# Patient Record
Sex: MALE | Race: BLACK OR AFRICAN AMERICAN | Employment: UNEMPLOYED | URBAN - METROPOLITAN AREA
[De-identification: names, ages, dates, MRNs, and addresses within clinical notes are randomized per-mention and may not be internally consistent; named-entity substitution may affect disease eponyms.]

---

## 2017-01-20 ENCOUNTER — HOSPITAL ENCOUNTER (EMERGENCY)
Age: 37
Discharge: HOME | End: 2017-01-20
Attending: EMERGENCY MEDICINE
Payer: SELF-PAY

## 2017-01-20 VITALS
HEART RATE: 74 BPM | BODY MASS INDEX: 36.57 KG/M2 | TEMPERATURE: 98.6 F | OXYGEN SATURATION: 98 % | WEIGHT: 270 LBS | HEIGHT: 72 IN | SYSTOLIC BLOOD PRESSURE: 158 MMHG | RESPIRATION RATE: 16 BRPM | DIASTOLIC BLOOD PRESSURE: 95 MMHG

## 2017-01-20 DIAGNOSIS — E79.0 ELEVATED URIC ACID IN BLOOD: ICD-10-CM

## 2017-01-20 DIAGNOSIS — M25.561 PAIN AND SWELLING OF KNEE, RIGHT: Primary | ICD-10-CM

## 2017-01-20 DIAGNOSIS — M25.461 PAIN AND SWELLING OF KNEE, RIGHT: Primary | ICD-10-CM

## 2017-01-20 DIAGNOSIS — M10.061 ACUTE IDIOPATHIC GOUT OF RIGHT KNEE: ICD-10-CM

## 2017-01-20 LAB
ANION GAP BLD CALC-SCNC: 6 MMOL/L (ref 3–18)
BUN SERPL-MCNC: 10 MG/DL (ref 7–18)
BUN/CREAT SERPL: 9 (ref 12–20)
CALCIUM SERPL-MCNC: 8.8 MG/DL (ref 8.5–10.1)
CHLORIDE SERPL-SCNC: 103 MMOL/L (ref 100–108)
CO2 SERPL-SCNC: 28 MMOL/L (ref 21–32)
CREAT SERPL-MCNC: 1.17 MG/DL (ref 0.6–1.3)
GLUCOSE SERPL-MCNC: 89 MG/DL (ref 74–99)
POTASSIUM SERPL-SCNC: 4 MMOL/L (ref 3.5–5.5)
SODIUM SERPL-SCNC: 137 MMOL/L (ref 136–145)
URATE SERPL-MCNC: 7.7 MG/DL (ref 2.6–7.2)

## 2017-01-20 PROCEDURE — 84550 ASSAY OF BLOOD/URIC ACID: CPT | Performed by: PHYSICIAN ASSISTANT

## 2017-01-20 PROCEDURE — 99282 EMERGENCY DEPT VISIT SF MDM: CPT

## 2017-01-20 PROCEDURE — 80048 BASIC METABOLIC PNL TOTAL CA: CPT | Performed by: PHYSICIAN ASSISTANT

## 2017-01-20 RX ORDER — INDOMETHACIN 50 MG/1
50 CAPSULE ORAL 3 TIMES DAILY
Qty: 15 CAP | Refills: 0 | Status: SHIPPED | OUTPATIENT
Start: 2017-01-20 | End: 2017-01-25

## 2017-01-20 RX ORDER — COLCHICINE 0.6 MG/1
0.6 CAPSULE ORAL DAILY
Qty: 10 CAP | Refills: 1 | Status: SHIPPED | OUTPATIENT
Start: 2017-01-20 | End: 2017-06-02

## 2017-01-20 RX ORDER — HYDROCODONE BITARTRATE AND ACETAMINOPHEN 5; 325 MG/1; MG/1
1 TABLET ORAL
Qty: 8 TAB | Refills: 0 | Status: SHIPPED | OUTPATIENT
Start: 2017-01-20 | End: 2017-06-02

## 2017-01-20 NOTE — LETTER
NOTIFICATION RETURN TO WORK / SCHOOL 
 
1/20/2017 10:33 AM 
 
Mr. Kamilah Hawley 
[address] [place] [address] To Whom It May Concern: Kamilah Hawley is currently under the care of [facility] EMERGENCY DEPT. He will return to work/school on: 01/21/17 If there are questions or concerns please have the patient contact our office.  
 
 
 
Sincerely, 
 
 
 
 
 
 
 
JOON Agarwal

## 2017-01-20 NOTE — DISCHARGE INSTRUCTIONS
WEIGHT LOSS. STOP DRINKING.    FOLLOW UP WITH ORTHOPEDIC SPECIALIST FOR POSSIBLE DRAINAGE OF FLUID FROM RIGHT KNEE

## 2017-01-20 NOTE — ED PROVIDER NOTES
HPI Comments: 43yo male presents to ER complaining of right knee pain and swelling. States discomfort started 2 days ago and he thinks it is his gout. Admits to history of testing that indicated he had gout. States he initially was diagnosed with gout when right foot became became swollen, painful, \"purple/red\" discoloration. Denies any hip, thigh, lower leg, ankle, foot pain currently. Pain increased with weight bearing. Denies any recent or remote injuries. Patient is a 43 y.o. male presenting with knee pain and knee swelling. Knee Pain    Pertinent negatives include no numbness and no back pain. Knee Swelling    Pertinent negatives include no numbness and no back pain. Past Medical History:   Diagnosis Date    Elevated uric acid in blood 03/05/2015     8.6 mg/dL    Hyperlipidemia     Hypertension     Medical non-compliance     Renal insufficiency 04/06/2011     BUN/sCr: 14/1.4, GFR >60    Right knee pain 09/20/2010     Plain Film NEG    Right knee pain 04/10/2015     Plain Film NEG       Past Surgical History:   Procedure Laterality Date    Hx tonsillectomy      Hx cyst removal           No family history on file. Social History     Social History    Marital status: SINGLE     Spouse name: N/A    Number of children: N/A    Years of education: N/A     Occupational History    Not on file. Social History Main Topics    Smoking status: Never Smoker    Smokeless tobacco: Not on file    Alcohol use Yes      Comment: socially    Drug use: No    Sexual activity: Yes     Birth control/ protection: Condom     Other Topics Concern    Not on file     Social History Narrative         ALLERGIES: Review of patient's allergies indicates no known allergies. Review of Systems   Constitutional: Positive for fatigue. Negative for activity change, appetite change, chills and fever. HENT: Negative. Respiratory: Negative. Negative for shortness of breath.     Cardiovascular: Negative. Negative for chest pain. Gastrointestinal: Negative for abdominal pain. Musculoskeletal: Positive for arthralgias and joint swelling. Negative for back pain, gait problem and myalgias. Skin: Negative for color change and rash. Neurological: Negative for weakness and numbness. All other systems reviewed and are negative. Vitals:    01/20/17 0856   BP: (!) 158/95   Pulse: 74   Resp: 16   Temp: 98.6 °F (37 °C)   SpO2: 98%   Weight: 122.5 kg (270 lb)   Height: 6' (1.829 m)            Physical Exam   Constitutional: He is oriented to person, place, and time. He appears well-developed. No distress. Obese   HENT:   Mouth/Throat: Oropharynx is clear and moist.   Neck: Normal range of motion. Cardiovascular: Normal rate, regular rhythm and normal heart sounds. Pulmonary/Chest: Effort normal and breath sounds normal.   Musculoskeletal:   Right knee is moderately swollen. No obvious warmth, no redness. Diffuse TTP, mildly. Mild pain with passive and active ROM. No instability. No lower leg swelling or pitting edema. Neurological: He is alert and oriented to person, place, and time. Skin: Skin is warm and dry. He is not diaphoretic. Psychiatric: He has a normal mood and affect. Nursing note and vitals reviewed. MDM  Number of Diagnoses or Management Options  Diagnosis management comments: 43yo male with right knee pain and swelling. I have reviewed 2 xrays, one from April 2015 which showed possible effusion and the other xray from Sept 2016 that showed very large effusion. Patient has not seen PCP or orthopedic specialist since last ER visit. Uric acid level in March 2015 was 8.6. Will recheck istat and uric acid today. If uric acid elevated with normal renal function will discharge with colchicine, indocin and few [place].  PCP and ortho referral.  Do not feel repeat xray warranted in the setting of no trauma.   Patient informed of elevated BP, need for weight loss, and advised to discontinue drinking 3-6 beers daily which he admitted to during history taking.      ED Course       Procedures

## 2017-04-04 ENCOUNTER — HOSPITAL ENCOUNTER (EMERGENCY)
Age: 37
Discharge: HOME | End: 2017-04-04
Attending: EMERGENCY MEDICINE
Payer: SELF-PAY

## 2017-04-04 VITALS
OXYGEN SATURATION: 97 % | WEIGHT: 285 LBS | HEART RATE: 73 BPM | RESPIRATION RATE: 17 BRPM | SYSTOLIC BLOOD PRESSURE: 151 MMHG | TEMPERATURE: 98.1 F | BODY MASS INDEX: 38.65 KG/M2 | DIASTOLIC BLOOD PRESSURE: 55 MMHG

## 2017-04-04 DIAGNOSIS — H10.11 ALLERGIC CONJUNCTIVITIS, RIGHT: Primary | ICD-10-CM

## 2017-04-04 PROCEDURE — 99283 EMERGENCY DEPT VISIT LOW MDM: CPT

## 2017-04-04 RX ORDER — PROPARACAINE HYDROCHLORIDE 5 MG/ML
2 SOLUTION/ DROPS OPHTHALMIC
Status: DISCONTINUED | OUTPATIENT
Start: 2017-04-04 | End: 2017-04-04 | Stop reason: HOSPADM

## 2017-04-04 RX ORDER — DIPHENHYDRAMINE HCL 25 MG
CAPSULE ORAL
Qty: 16 CAP | Refills: 0 | Status: SHIPPED | OUTPATIENT
Start: 2017-04-04 | End: 2017-06-02

## 2017-04-04 NOTE — ED TRIAGE NOTES
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 And some of the debris got into his eye.  Pt's right eye red,  Eyelid swollen

## 2017-04-04 NOTE — LETTER
[facility] 
[facility] EMERGENCY DEPT 
[address] [facility] [address] 
[phone] Work/School Note Date: 4/4/2017 To Whom It May concern: 
 
Patient can return to work on April 5, 2017. Eddie Lang was seen and treated today in the emergency room by the following provider(s): 
Attending Provider: Christel Reyes MD 
Physician Assistant: Willa Dallas PA-C. Eddie Lang Sincerely, AMAN Callahan

## 2017-04-04 NOTE — LETTER
[address] [place] [address] Dr [facility] EMERGENCY DEPT 
[address] [facility] [address] 
[phone] Work/School Note Date: 4/4/2017 To Whom It May concern: Saida Longoria was seen and treated today in the emergency room by the following provider(s): 
Attending Provider: Armin Giles MD 
Physician Assistant: Tiffanie Lora PA-C. Naveed Herrera {Return to school/sport/work:[id number]} Sincerely, Tiffanie Lora PA-C

## 2017-04-04 NOTE — LETTER
[facility] 
[facility] EMERGENC[facility]PT 
[address] [facility] [address] 
[phone] Work/School Note Date: 4/4/2017 To Whom It May concern: Daina Kerr was seen and treated today in the emergency room by the following provider(s): 
Attending Provider: Amy Valente MD 
Physician Assistant: Deneen Ríos PA-C. Daina Kerr can return to work on April 5, 2017. Sincerely, AMAN Perrin

## 2017-04-04 NOTE — ED PROVIDER NOTES
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 States he was working and believes debris came into contact with eye- irritation and redness since. No change in vision. No hx of std  Past Medical History:  03/05/2015: Elevated uric acid in blood      Comment: 8.6 mg/dL  No date: Hyperlipidemia  No date: Hypertension  No date: Medical non-compliance  04/06/2011: Renal insufficiency      Comment: BUN/sCr: 14/1.4, GFR >60  09/20/2010: Right knee pain      Comment: Plain Film NEG  04/10/2015: Right knee pain      Comment: Plain Film NEG       Patient is a 43 y.o. male presenting with eye pain. The history is provided by the patient. Eye Pain    This is a new problem. The current episode started 3 to 5 hours ago. The problem has not changed since onset. The injury mechanism is unknown. The patient is experiencing no pain. There is no known exposure to pink eye. He does not wear contacts. Associated symptoms include discharge, eye redness and itching. Pertinent negatives include no numbness, no blurred vision, no double vision, no foreign body sensation, no photophobia, no nausea, no vomiting, no tingling, no weakness, no fever, no pain, no blindness, no head injury and no dizziness. He has tried water, commercial eye wash and eye drops for the symptoms. The treatment provided moderate relief. Past Medical History:   Diagnosis Date    Elevated uric acid in blood 03/05/2015    8.6 mg/dL    Hyperlipidemia     Hypertension     Medical non-compliance     Renal insufficiency 04/06/2011    BUN/sCr: 14/1.4, GFR >60    Right knee pain 09/20/2010    Plain Film NEG    Right knee pain 04/10/2015    Plain Film NEG       Past Surgical History:   Procedure Laterality Date    HX CYST REMOVAL      HX TONSILLECTOMY           No family history on file.     Social History     Social History    Marital status: SINGLE     Spouse name: N/A    Number of children: N/A    Years of education: N/A     Occupational History    Not on file. Social History Main Topics    Smoking status: Never Smoker    Smokeless tobacco: Not on file    Alcohol use Yes      Comment: socially    Drug use: No    Sexual activity: Yes     Birth control/ protection: Condom     Other Topics Concern    Not on file     Social History Narrative         ALLERGIES: Review of patient's allergies indicates no known allergies. Review of Systems   Constitutional: Negative for chills and fever. Eyes: Positive for discharge, redness and itching. Negative for blindness, blurred vision, double vision, photophobia, pain and visual disturbance. Respiratory: Negative. Gastrointestinal: Negative for nausea and vomiting. Genitourinary: Negative. Skin: Positive for itching. Allergic/Immunologic: Negative. Neurological: Negative for dizziness, tingling, weakness and numbness. Vitals:    04/04/17 0540   BP: 151/55   Pulse: 73   Resp: 17   Temp: 98.1 °F (36.7 °C)   SpO2: 97%   Weight: 129.3 kg (285 lb)            Physical Exam   Constitutional: He is oriented to person, place, and time. He appears well-developed and well-nourished. HENT:   Head: Normocephalic and atraumatic. Mouth/Throat: Oropharynx is clear and moist.   Eyes: EOM are normal. Pupils are equal, round, and reactive to light. Lids are everted and swept, no foreign bodies found. Right eye exhibits discharge. Right eye exhibits no chemosis, no exudate and no hordeolum. No foreign body present in the right eye. Left eye exhibits no chemosis, no discharge, no exudate and no hordeolum. No foreign body present in the left eye. Right conjunctiva is injected. Right conjunctiva has no hemorrhage. Left conjunctiva is not injected. Left conjunctiva has no hemorrhage. No scleral icterus. Right eye exhibits normal extraocular motion and no nystagmus. Left eye exhibits normal extraocular motion and no nystagmus.    Right eye visualized under woods lamp and tetracaine/fluoro, no abrasion noted  Mild swelling noted to upper eyelid. No periorbital pain with palpation or ROM. Clear drainage. Vision grossly intact. Neck: Normal range of motion. Cardiovascular: Normal rate, regular rhythm and normal heart sounds. Pulmonary/Chest: Effort normal. No respiratory distress. He has no wheezes. He has no rales. Abdominal: Soft. He exhibits no distension. Neurological: He is oriented to person, place, and time. Nursing note and vitals reviewed. MDM  Number of Diagnoses or Management Options  Allergic conjunctivitis, right:   Diagnosis management comments: Impression: allergic conjunctivitis   Rx: Naphcon, benadryl as needed  optha f/u in 2 days.  Work note for today     ED Course       Procedures

## 2017-04-04 NOTE — LETTER
[address] [place] [address] Dr [facility] EMERGENCY DEPT 
[address] [facility] [id number] [phone] Work/School Note Date: 4/4/2017 To Whom It May concern: Eddie Lang was seen and treated today in the emergency room by the following provider(s): 
Attending Provider: Chuck Kirby MD 
Physician Assistant: Willa Dallas PA-C. Eddie Lang return to work 4/6/17 Sincerely, Willa Dallas PA-C

## 2017-04-04 NOTE — DISCHARGE INSTRUCTIONS
Allergic Conjunctivitis Care Instructions  Your Care Instructions    Allergic conjunctivitis (say \"rdx-IYQX-pcy-VY-tus\") is an eye problem that many teens get. It is often called pinkeye. In pinkeye, the lining of the eyelid and the eye surface become red and swollen. The lining is called the conjunctiva (say \"dreu-cqca-CA-vuh\"). Pinkeye can be caused by bacteria, a virus, or an allergy. Your pinkeye is caused by an allergy. A substance (allergen) triggers a reaction that results in the symptoms. This type of pinkeye cannot be spread from person to person. You may have other symptoms of an allergy, such as a runny nose. Allergic pinkeye goes away when you keep away from the allergen that triggers the pinkeye. Triggers include pollen, mold, and animal skin cells (dander). But because it is not always possible to stay away from triggers, your doctor may suggest eyedrops to treat the symptoms. Antibiotics do not help with allergies. Follow-up care is a key part of your treatment and safety. Be sure to make and go to all appointments, and call your doctor if you are having problems. It's also a good idea to know your test results and keep a list of the medicines you take. How can you care for yourself at home? Use medicines as directed  · Take medicines exactly as prescribed. Call your doctor if you are having a problem with your medicine. You will get more details on the specific medicines your doctor prescribes. · If the doctor gave you eyedrops, use them as directed. Keep the bottle tip clean. To put in eyedrops:  ¨ Tilt your head back, and pull your lower eyelid down with one finger. ¨ Drop or squirt the medicine inside the lower lid. ¨ Close your eye for 30 to 60 seconds to let the drops move around. ¨ Do not touch the tip of the bottle to your eyelashes or any other surface. Make yourself comfortable  · Use moist cotton or a clean, wet cloth to remove the crust from your eyes.  Wipe from the inside corner of the eye to the outside. Use a clean part of the cloth for each wipe. · Put cold or warm wet cloths on your eyes a few times a day if your eyes hurt or are itching. · Do not wear contact lenses until the pinkeye is gone. Clean the contacts and storage case. · If you wear disposable contacts, get out a new pair when your eyes have cleared and it is safe to wear contacts again. Avoid triggers  · Try to find what triggers the pinkeye. Then take steps to avoid it. For example:  ¨ Control animal dander and other pet allergens by keeping pets only in certain areas of your home. ¨ Avoid outdoor pollens by staying inside while pollen counts are high. ¨ Control indoor mold by cleaning bathtubs and showers monthly. When should you call for help? Call your doctor now or seek immediate medical care if:  · You have pain in an eye, not just irritation on the surface. · You have a change in vision or a loss of vision. · Pinkeye lasts longer than 7 days. Watch closely for changes in your health, and be sure to contact your doctor if:  · You do not get better as expected. Where can you learn more? Go to http://elli-vani.info/. Enter  in the search box to learn more about \"Allergic Conjunctivitis in Teens: Care Instructions. \"  Current as of: May 27, 2016  Content Version: 11.2  © 1473-7073 [employer]. Care instructions adapted under license by [employer] (?????) (which disclaims liability or warranty for this information). If you have questions about a medical condition or this instruction, always ask your healthcare professional. [place] [address] any warranty or liability for your use of this information.

## 2017-06-02 ENCOUNTER — HOSPITAL ENCOUNTER (EMERGENCY)
Age: 37
Discharge: HOME | End: 2017-06-02
Attending: EMERGENCY MEDICINE
Payer: SELF-PAY

## 2017-06-02 VITALS
HEART RATE: 62 BPM | OXYGEN SATURATION: 99 % | TEMPERATURE: 98 F | SYSTOLIC BLOOD PRESSURE: 158 MMHG | WEIGHT: 283 LBS | HEIGHT: 72 IN | BODY MASS INDEX: 38.33 KG/M2 | DIASTOLIC BLOOD PRESSURE: 96 MMHG

## 2017-06-02 DIAGNOSIS — M25.461 PAIN AND SWELLING OF KNEE, RIGHT: Primary | ICD-10-CM

## 2017-06-02 DIAGNOSIS — M25.561 PAIN AND SWELLING OF KNEE, RIGHT: Primary | ICD-10-CM

## 2017-06-02 PROCEDURE — 99283 EMERGENCY DEPT VISIT LOW MDM: CPT

## 2017-06-02 PROCEDURE — 74011250637 HC RX REV CODE- 250/637: Performed by: PHYSICIAN ASSISTANT

## 2017-06-02 RX ORDER — OXYCODONE AND ACETAMINOPHEN 5; 325 MG/1; MG/1
1 TABLET ORAL
Status: COMPLETED | OUTPATIENT
Start: 2017-06-02 | End: 2017-06-02

## 2017-06-02 RX ORDER — COLCHICINE 0.6 MG/1
0.6 CAPSULE ORAL DAILY
Qty: 10 CAP | Refills: 1 | Status: SHIPPED | OUTPATIENT
Start: 2017-06-02 | End: 2018-02-19

## 2017-06-02 RX ORDER — HYDROCODONE BITARTRATE AND ACETAMINOPHEN 5; 325 MG/1; MG/1
1 TABLET ORAL
Qty: 16 TAB | Refills: 0 | Status: SHIPPED | OUTPATIENT
Start: 2017-06-02 | End: 2018-01-18

## 2017-06-02 RX ADMIN — OXYCODONE HYDROCHLORIDE AND ACETAMINOPHEN 1 TABLET: 5; 325 TABLET ORAL at 12:28

## 2017-06-02 NOTE — ED PROVIDER NOTES
HPI Comments: 41yo male presents to ER complaining of right knee pain that started 3 days ago. States he has gout in right knee. Denies any injuries. No fever/chills. Admits to pain with movement and weight bearing. Unable to see PCP because he doesn't have insurance. Denies having ortho specialist.     Patient is a 43 y.o. male presenting with knee pain and knee swelling. Knee Pain    Pertinent negatives include no numbness. Knee Swelling    Pertinent negatives include no numbness. Past Medical History:   Diagnosis Date    Elevated uric acid in blood 03/05/2015    8.6 mg/dL    Hyperlipidemia     Hypertension     Medical non-compliance     Renal insufficiency 04/06/2011    BUN/sCr: 14/1.4, GFR >60    Right knee pain 09/20/2010    Plain Film NEG    Right knee pain 04/10/2015    Plain Film NEG       Past Surgical History:   Procedure Laterality Date    HX CYST REMOVAL      HX TONSILLECTOMY           No family history on file. Social History     Social History    Marital status: SINGLE     Spouse name: N/A    Number of children: N/A    Years of education: N/A     Occupational History    Not on file. Social History Main Topics    Smoking status: Never Smoker    Smokeless tobacco: Not on file    Alcohol use Yes      Comment: socially    Drug use: No    Sexual activity: Yes     Birth control/ protection: Condom     Other Topics Concern    Not on file     Social History Narrative         ALLERGIES: Review of patient's allergies indicates no known allergies. Review of Systems   Constitutional: Positive for activity change. Negative for appetite change, chills and fever. Gastrointestinal: Negative. Musculoskeletal: Positive for arthralgias and joint swelling. Skin: Negative for color change and wound. Neurological: Negative for weakness and numbness. Psychiatric/Behavioral: Negative. All other systems reviewed and are negative.       Vitals:    06/02/17 1048   BP: (!) 166/101   Pulse: (!) 59   Temp: 98.1 °F (36.7 °C)   SpO2: 97%   Weight: 128.4 kg (283 lb)   Height: 6' (1.829 m)            Physical Exam   Constitutional: He is oriented to person, place, and time. He appears well-developed. No distress. Overweight   Neck: Normal range of motion. Pulmonary/Chest: Effort normal.   Musculoskeletal:   Right knee with moderate diffuse swelling. No obvious erythema, slightly warm to touch. Pain with passive and active ROM, TTP over anterior joint line and patellar area. Neurological: He is alert and oriented to person, place, and time. Skin: Skin is warm and dry. He is not diaphoretic. No erythema. Nursing note and vitals reviewed. MDM  Number of Diagnoses or Management Options  Pain and swelling of knee, right:   Diagnosis management comments: 41yo male presenting to ER with right knee pain/swelling starting 3 days ago, similar to symptoms he's experienced in the past.  No trauma, do not feel emergent xray warranted. States he has gout. No suspicion for septic joint. Rx for colchicine, [place].  Will avoid NSAIDS given history or renal insufficiency. PCP and ortho referrals.      ED Course       Procedures

## 2017-08-23 ENCOUNTER — HOSPITAL ENCOUNTER (EMERGENCY)
Age: 37
Discharge: HOME | End: 2017-08-23
Attending: EMERGENCY MEDICINE
Payer: SELF-PAY

## 2017-08-23 VITALS
DIASTOLIC BLOOD PRESSURE: 98 MMHG | SYSTOLIC BLOOD PRESSURE: 163 MMHG | OXYGEN SATURATION: 95 % | HEIGHT: 72 IN | RESPIRATION RATE: 20 BRPM | TEMPERATURE: 98 F | HEART RATE: 75 BPM | BODY MASS INDEX: 37.99 KG/M2 | WEIGHT: 280.5 LBS

## 2017-08-23 DIAGNOSIS — M10.071 ACUTE IDIOPATHIC GOUT OF RIGHT FOOT: Primary | ICD-10-CM

## 2017-08-23 PROCEDURE — 74011250637 HC RX REV CODE- 250/637: Performed by: EMERGENCY MEDICINE

## 2017-08-23 PROCEDURE — 74011636637 HC RX REV CODE- 636/637: Performed by: EMERGENCY MEDICINE

## 2017-08-23 PROCEDURE — 99283 EMERGENCY DEPT VISIT LOW MDM: CPT

## 2017-08-23 RX ORDER — PREDNISONE 20 MG/1
TABLET ORAL
Qty: 12 TAB | Refills: 0 | Status: SHIPPED | OUTPATIENT
Start: 2017-08-23 | End: 2018-01-18

## 2017-08-23 RX ORDER — COLCHICINE 0.6 MG/1
1.2 TABLET ORAL
Status: COMPLETED | OUTPATIENT
Start: 2017-08-23 | End: 2017-08-23

## 2017-08-23 RX ORDER — OXYCODONE AND ACETAMINOPHEN 5; 325 MG/1; MG/1
1 TABLET ORAL
Qty: 10 TAB | Refills: 0 | Status: SHIPPED | OUTPATIENT
Start: 2017-08-23 | End: 2018-01-18

## 2017-08-23 RX ORDER — OXYCODONE AND ACETAMINOPHEN 5; 325 MG/1; MG/1
1 TABLET ORAL
Status: COMPLETED | OUTPATIENT
Start: 2017-08-23 | End: 2017-08-23

## 2017-08-23 RX ORDER — PREDNISONE 20 MG/1
60 TABLET ORAL
Status: COMPLETED | OUTPATIENT
Start: 2017-08-23 | End: 2017-08-23

## 2017-08-23 RX ORDER — COLCHICINE 0.6 MG/1
0.6 TABLET ORAL
Status: DISCONTINUED | OUTPATIENT
Start: 2017-08-23 | End: 2017-08-23 | Stop reason: HOSPADM

## 2017-08-23 RX ADMIN — Medication 1.2 MG: at 16:14

## 2017-08-23 RX ADMIN — PREDNISONE 60 MG: 20 TABLET ORAL at 16:14

## 2017-08-23 RX ADMIN — OXYCODONE HYDROCHLORIDE AND ACETAMINOPHEN 1 TABLET: 5; 325 TABLET ORAL at 16:14

## 2017-08-23 NOTE — ED PROVIDER NOTES
HPI Comments: 2:48 PM Tad Tate is a 42 y.o. male who presents to the ED c/o right foot and toe pain x 2 days associated with gout. He states that he has not been regulating his food intake and drinking over the past few days. He was last seen in this ED for similar Sx in early June. He states that he has financial difficulty with PCP follow up. The history is provided by the patient. No  was used. Past Medical History:   Diagnosis Date    Elevated uric acid in blood 03/05/2015    8.6 mg/dL    Hyperlipidemia     Hypertension     Medical non-compliance     Renal insufficiency 04/06/2011    BUN/sCr: 14/1.4, GFR >60    Right knee pain 09/20/2010    Plain Film NEG    Right knee pain 04/10/2015    Plain Film NEG       Past Surgical History:   Procedure Laterality Date    HX CYST REMOVAL      HX TONSILLECTOMY           History reviewed. No pertinent family history. Social History     Social History    Marital status: SINGLE     Spouse name: N/A    Number of children: N/A    Years of education: N/A     Occupational History    Not on file. Social History Main Topics    Smoking status: Never Smoker    Smokeless tobacco: Never Used    Alcohol use Yes      Comment: socially    Drug use: No    Sexual activity: Yes     Birth control/ protection: Condom     Other Topics Concern    Not on file     Social History Narrative         ALLERGIES: Review of patient's allergies indicates no known allergies. Review of Systems   Musculoskeletal: Positive for arthralgias and myalgias. All other systems reviewed and are negative. Vitals:    08/23/17 1441   BP: (!) 163/98   Pulse: 75   Resp: 20   Temp: 98 °F (36.7 °C)   SpO2: 95%   Weight: 127.2 kg (280 lb 8 oz)   Height: 6' (1.829 m)            Physical Exam   Constitutional: He is oriented to person, place, and time. He appears well-developed. HENT:   Head: Normocephalic and atraumatic.    Eyes: EOM are normal. Pupils are equal, round, and reactive to light. Neck: Normal range of motion. Neck supple. Cardiovascular: Normal rate, regular rhythm and normal heart sounds. Exam reveals no friction rub. No murmur heard. Pulmonary/Chest: Effort normal and breath sounds normal. No respiratory distress. He has no wheezes. Abdominal: Soft. He exhibits no distension. There is no tenderness. There is no rebound and no guarding. Musculoskeletal: Normal range of motion. TTP right great toe; no sig erythema or warmth   Neurological: He is alert and oriented to person, place, and time. Skin: Skin is warm and dry. Psychiatric: He has a normal mood and affect. His behavior is normal. Thought content normal.        MDM  Number of Diagnoses or Management Options  Diagnosis management comments: 1. Foot pain; hx of gout, with similar sx; exam unremarklable; given colchicine here, will rx perc and pred. ED Course       Procedures      Scribe Attestation      Issac Cowart acting as a scribe for and in the presence of Veto Patel MD      August 23, 2017 at 2:52 PM       Provider Attestation:      I personally performed the services described in the documentation, reviewed the documentation, as recorded by the scribe in my presence, and it accurately and completely records my words and actions.  August 23, 2017 at 2:52 PM - Veto Patel MD

## 2018-01-18 ENCOUNTER — HOSPITAL ENCOUNTER (EMERGENCY)
Age: 38
Discharge: HOME | End: 2018-01-18
Attending: EMERGENCY MEDICINE
Payer: SELF-PAY

## 2018-01-18 VITALS
OXYGEN SATURATION: 98 % | SYSTOLIC BLOOD PRESSURE: 161 MMHG | HEIGHT: 72 IN | DIASTOLIC BLOOD PRESSURE: 99 MMHG | RESPIRATION RATE: 20 BRPM | TEMPERATURE: 98.1 F | HEART RATE: 96 BPM | WEIGHT: 278 LBS | BODY MASS INDEX: 37.65 KG/M2

## 2018-01-18 DIAGNOSIS — M10.9 ACUTE GOUT OF LEFT KNEE, UNSPECIFIED CAUSE: Primary | ICD-10-CM

## 2018-01-18 DIAGNOSIS — I10 ESSENTIAL HYPERTENSION: ICD-10-CM

## 2018-01-18 PROCEDURE — 99283 EMERGENCY DEPT VISIT LOW MDM: CPT

## 2018-01-18 PROCEDURE — 74011250637 HC RX REV CODE- 250/637: Performed by: PHYSICIAN ASSISTANT

## 2018-01-18 RX ORDER — HYDROCODONE BITARTRATE AND ACETAMINOPHEN 5; 325 MG/1; MG/1
1 TABLET ORAL
Qty: 12 TAB | Refills: 0 | Status: SHIPPED | OUTPATIENT
Start: 2018-01-18 | End: 2018-02-19

## 2018-01-18 RX ORDER — PREDNISONE 20 MG/1
TABLET ORAL
Qty: 12 TAB | Refills: 0 | Status: SHIPPED | OUTPATIENT
Start: 2018-01-18 | End: 2018-02-19

## 2018-01-18 RX ORDER — LISINOPRIL 20 MG/1
20 TABLET ORAL DAILY
Qty: 30 TAB | Refills: 0 | Status: SHIPPED | OUTPATIENT
Start: 2018-01-18 | End: 2018-02-19

## 2018-01-18 RX ORDER — HYDROCODONE BITARTRATE AND ACETAMINOPHEN 5; 325 MG/1; MG/1
1 TABLET ORAL
Status: COMPLETED | OUTPATIENT
Start: 2018-01-18 | End: 2018-01-18

## 2018-01-18 RX ADMIN — HYDROCODONE BITARTRATE AND ACETAMINOPHEN 1 TABLET: 5; 325 TABLET ORAL at 09:39

## 2018-01-18 NOTE — LETTER
[address] [place] [address] Dr [facility] EMERGENCY DEPT 
[address] [facility] [id number] [phone] Work/School Note Date: 1/18/2018 To Whom It May concern: Easton Persaud was seen and treated today in the emergency room by the following provider(s): 
Attending Provider: Angelia Hall MD 
Physician Assistant: Zoë Chowdary. Easton Persaud may return to work on 1/20/18. Sincerely, 
 
 
 
 
Zoë Chowdary

## 2018-01-18 NOTE — ED NOTES
Pt discharged per MD order. Pt verbalized understanding of discharge instructions and follow up care. Prescription education given. Pt offered wheelchair and refused, pt states he will ambulate independently to waiting room.

## 2018-01-18 NOTE — ED PROVIDER NOTES
EMERGENCY DEPARTMENT HISTORY AND PHYSICAL EXAM    9:19 AM      Date: 1/18/2018  Patient Name: Sisi Craft    History of Presenting Illness     No chief complaint on file. History Provided By: Patient    Chief Complaint: L knee pain, edema   Duration:  Days  Timing:  Constant  Location: L knee  Quality: Aching  Severity: Moderate to severe  Modifying Factors: worse with movement  Associated Symptoms: denies any other associated signs or symptoms      Additional History (Context): Sisi Craft is a 42 y.o. male with hypertension, gout who presents with c/o L knee pain and edema x 3 days. Pt notes increased alcohol intake this weekend which he thinks may be contributing. Notes the symptoms feel typical of his gout. Denies fever/chills, trauma, calf pain/tenderness. Notes he took some \"percocet from the last time\" which helped with symptoms. Does not have PMD for follow-up. Notes the Colchicine was too expensive last time. PCP: Jorge Chacon MD    Current Facility-Administered Medications   Medication Dose Route Frequency Provider Last Rate Last Dose    HYDROcodone-acetaminophen (NORCO) 5-325 mg per tablet 1 Tab  1 Tab Oral NOW Kallie Phalen Scissom         Current Outpatient Prescriptions   Medication Sig Dispense Refill    predniSONE (DELTASONE) 20 mg tablet 60mg x 3 days  40mg x 3 days  20 mg x 3 days  10 mg x 3 days. 12 Tab 0    HYDROcodone-acetaminophen (NORCO) 5-325 mg per tablet Take 1 Tab by mouth every six (6) hours as needed for Pain. Max Daily Amount: 4 Tabs. 12 Tab 0    colchicine (MITIGARE) 0.6 mg capsule Take 1 Cap by mouth daily.  10 Cap 1       Past History     Past Medical History:  Past Medical History:   Diagnosis Date    Elevated uric acid in blood 03/05/2015    8.6 mg/dL    Hyperlipidemia     Hypertension     Medical non-compliance     Renal insufficiency 04/06/2011    BUN/sCr: 14/1.4, GFR >60    Right knee pain 09/20/2010    Plain Film NEG    Right knee pain 04/10/2015    Plain Film NEG       Past Surgical History:  Past Surgical History:   Procedure Laterality Date    HX CYST REMOVAL      HX TONSILLECTOMY         Family History:  No family history on file. Social History:  Social History   Substance Use Topics    Smoking status: Never Smoker    Smokeless tobacco: Never Used    Alcohol use Yes      Comment: socially       Allergies:  No Known Allergies      Review of Systems       Review of Systems   Constitutional: Negative for chills and fever. Respiratory: Negative for shortness of breath. Cardiovascular: Negative for chest pain. Gastrointestinal: Negative for abdominal pain, nausea and vomiting. Musculoskeletal: Positive for joint swelling. Skin: Negative for rash and wound. Neurological: Negative for weakness. All other systems reviewed and are negative. Physical Exam     Visit Vitals    BP (!) 161/99 (BP 1 Location: Left arm, BP Patient Position: Sitting)    Pulse 96    Temp 98.1 °F (36.7 °C)    Resp 20    Ht 6' (1.829 m)    Wt 126.1 kg (278 lb)    SpO2 98%    BMI 37.7 kg/m2         Physical Exam   Constitutional: He appears well-developed and well-nourished. No distress. HENT:   Head: Normocephalic and atraumatic. Neck: Normal range of motion. Neck supple. Cardiovascular: Normal rate, regular rhythm, normal heart sounds and intact distal pulses. Exam reveals no gallop and no friction rub. No murmur heard. Pulmonary/Chest: Effort normal and breath sounds normal. No respiratory distress. He has no wheezes. He has no rales. Musculoskeletal:        Left knee: He exhibits decreased range of motion (flexion) and swelling (moderate edema anterior knee). He exhibits no ecchymosis, no deformity and normal patellar mobility. Tenderness (and pain with passive ROM of knee) found. Neurological: He is alert. Skin: Skin is warm. No rash noted. He is not diaphoretic. Nursing note and vitals reviewed.         Diagnostic Study Results     Labs -  No results found for this or any previous visit (from the past 12 hour(s)). Radiologic Studies -   No orders to display         Medical Decision Making   I am the first provider for this patient. I reviewed the vital signs, available nursing notes, past medical history, past surgical history, family history and social history. Vital Signs-Reviewed the patient's vital signs. Pulse Oximetry Analysis -  98 on room air     Records Reviewed: Nursing Notes and Old Medical Records (Time of Review: 9:19 AM)    Provider Notes (Medical Decision Making): Pt with hx of gout who presents to the ED c/o L knee edema and pain with PROM. Non-traumatic. States symptoms are typical of his gout. No s/s of septic joint. Will give Prednisone and pain control. Referral to PMD. Refill on HTN medication. Stable for d/c with outpatient follow-up. Diagnosis     Clinical Impression:   1. Acute gout of left knee, unspecified cause    2. Essential hypertension        Disposition: home    Follow-up Information     Follow up With Details Comments Contact Info    [facility] EMERGENCY DEPT  If symptoms worsen [address] [place] [address] [address] [address]  [address] Schedule an appointment as soon as possible for a visit  [address]  [place] [address]  [address] [place] [address] Drive  [phone]           Patient's Medications   Start Taking    HYDROCODONE-ACETAMINOPHEN (NORCO) 5-325 MG PER TABLET    Take 1 Tab by mouth every six (6) hours as needed for Pain. Max Daily Amount: 4 Tabs. Continue Taking    COLCHICINE (MITIGARE) 0.6 MG CAPSULE    Take 1 Cap by mouth daily. These Medications have changed    Modified Medication Previous Medication    PREDNISONE (DELTASONE) 20 MG TABLET predniSONE (DELTASONE) 20 mg tablet       60mg x 3 days  40mg x 3 days  20 mg x 3 days  10 mg x 3 days.     60mg x 3 days  40mg x 3 days  20 mg x 3 days  10 mg x 3 days. Stop Taking    HYDROCODONE-ACETAMINOPHEN (NORCO) 5-325 MG PER TABLET    Take 1 Tab by mouth every four (4) hours as needed for Pain. Max Daily Amount: 6 Tabs. OXYCODONE-ACETAMINOPHEN (PERCOCET) 5-325 MG PER TABLET    Take 1 Tab by mouth every four (4) hours as needed for Pain.  Max Daily Amount: 6 Tabs.     __________________________

## 2018-01-18 NOTE — DISCHARGE INSTRUCTIONS
Acute High Blood Pressure: Care Instructions  Your Care Instructions    Acute high blood pressure is very high blood pressure. It's a serious problem. Very high blood pressure can damage your brain, heart, eyes, and kidneys. You may have been given medicines to lower your blood pressure. You may have gotten them as pills or through a needle in one of your veins. This is called an IV. And maybe you were given other medicines too. These can be needed when high blood pressure causes other problems. To keep your blood pressure at a lower level, you may need to make healthy lifestyle changes. And you will probably need to take medicines. Be sure to follow up with your doctor about your blood pressure and what you can do about it. Follow-up care is a key part of your treatment and safety. Be sure to make and go to all appointments, and call your doctor if you are having problems. It's also a good idea to know your test results and keep a list of the medicines you take. How can you care for yourself at home? · See your doctor as often as he or she recommends. This is to make sure your blood pressure is under control. You will probably go at least 2 times a year. But it may be more often at first.  · Take your blood pressure medicine exactly as prescribed. You may take one or more types. They include diuretics, beta-blockers, ACE inhibitors, calcium channel blockers, and angiotensin II receptor blockers. Call your doctor if you think you are having a problem with your medicine. · If you take blood pressure medicine, talk to your doctor before you take decongestants or anti-inflammatory medicine, such as ibuprofen. These can raise blood pressure. · Learn how to check your blood pressure at home. Check it often. · Ask your doctor if it's okay to drink alcohol. · Talk to your doctor about lifestyle changes that can help blood pressure. These include being active and not smoking.   When should you call for help?  Call 911 anytime you think you may need emergency care. This may mean having symptoms that suggest that your blood pressure is causing a serious heart or blood vessel problem. Your blood pressure may be over 180/110. ? For example, call 911 if:  ? · You have symptoms of a heart attack. These may include:  ¨ Chest pain or pressure, or a strange feeling in the chest.  ¨ Sweating. ¨ Shortness of breath. ¨ Nausea or vomiting. ¨ Pain, pressure, or a strange feeling in the back, neck, jaw, or upper belly or in one or both shoulders or arms. ¨ Lightheadedness or sudden weakness. ¨ A fast or irregular heartbeat. ? · You have symptoms of a stroke. These may include:  ¨ Sudden numbness, tingling, weakness, or loss of movement in your face, arm, or leg, especially on only one side of your body. ¨ Sudden vision changes. ¨ Sudden trouble speaking. ¨ Sudden confusion or trouble understanding simple statements. ¨ Sudden problems with walking or balance. ¨ A sudden, severe headache that is different from past headaches. ? · You have severe back or belly pain. ?Do not wait until your blood pressure comes down on its own. Get help right away. ?Call your doctor now or seek immediate care if:  ? · Your blood pressure is much higher than normal (such as 180/110 or higher), but you don't have symptoms. ? · You think high blood pressure is causing symptoms, such as:  ¨ Severe headache. ¨ Blurry vision. ? Watch closely for changes in your health, and be sure to contact your doctor if:  ? · Your blood pressure measures 140/90 or higher at least 2 times. That means the top number is 140 or higher or the bottom number is 90 or higher, or both. ? · You think you may be having side effects from your blood pressure medicine. ? · Your blood pressure is usually normal, but it goes above normal at least 2 times. Where can you learn more? Go to http://elli-vani.info/.   Enter N735 in the search box to learn more about \"Acute High Blood Pressure: Care Instructions. \"  Current as of: September 21, 2016  Content Version: 11.4  © 8100-7918 [employer]. Care instructions adapted under license by [employer] (which disclaims liability or warranty for this information). If you have questions about a medical condition or this instruction, always ask your healthcare professional. [address] any warranty or liability for your use of this information. Gout: Care Instructions  Your Care Instructions    Gout is a form of arthritis caused by a buildup of uric acid crystals in a joint. It causes sudden attacks of pain, swelling, redness, and stiffness, usually in one joint, especially the big toe. Gout usually comes on without a cause. But it can be brought on by drinking alcohol (especially beer) or eating seafood and red meat. Taking certain medicines, such as diuretics or aspirin, also can bring on an attack of gout. Taking your medicines as prescribed and following up with your doctor regularly can help you avoid gout attacks in the future. Follow-up care is a key part of your treatment and safety. Be sure to make and go to all appointments, and call your doctor if you are having problems. It's also a good idea to know your test results and keep a list of the medicines you take. How can you care for yourself at home? · If the joint is swollen, put ice or a cold pack on the area for 10 to 20 minutes at a time. Put a thin cloth between the ice and your skin. · Prop up the sore limb on a pillow when you ice it or anytime you sit or lie down during the next 3 days. Try to keep it above the level of your heart. This will help reduce swelling. · Rest sore joints. Avoid activities that put weight or strain on the joints for a few days. Take short rest breaks from your regular activities during the day. · Take your medicines exactly as prescribed.  Call your doctor if you think you are having a problem with your medicine. · Take pain medicines exactly as directed. ¨ If the doctor gave you a prescription medicine for pain, take it as prescribed. ¨ If you are not taking a prescription pain medicine, ask your doctor if you can take an over-the-counter medicine. · Eat less seafood and red meat. · Check with your doctor before drinking alcohol. · Losing weight, if you are overweight, may help reduce attacks of gout. But do not go on a [employer]. \" Losing a lot of weight in a short amount of time can cause a gout attack. When should you call for help? Call your doctor now or seek immediate medical care if:  ? · You have a fever. ? · The joint is so painful you cannot use it. ? · You have sudden, unexplained swelling, redness, warmth, or severe pain in one or more joints. ? Watch closely for changes in your health, and be sure to contact your doctor if:  ? · You have joint pain. ? · Your symptoms get worse or are not improving after 2 or 3 days. Where can you learn more? Go to http://elli-vani.info/. Enter P748 in the search box to learn more about \"Gout: Care Instructions. \"  Current as of: October 31, 2016  Content Version: 11.4  © 8591-5488 [employer]. Care instructions adapted under license by [employer] (which disclaims liability or warranty for this information). If you have questions about a medical condition or this instruction, always ask your healthcare professional. [place] [address] any warranty or liability for your use of this information. Going Activation    Thank you for requesting access to Going. Please follow the instructions below to securely access and download your online medical record. Going allows you to send messages to your doctor, view your test results, renew your prescriptions, schedule appointments, and more. How Do I Sign Up? 1.  In your internet browser, go to [email or web address]  2. Click on the First Time User? Click Here link in the Sign In box. You will be redirect to the New Member Sign Up page. 3. Enter your Ultimate Shopper Access Code exactly as it appears below. You will not need to use this code after youve completed the sign-up process. If you do not sign up before the expiration date, you must request a new code. Ultimate Shopper Access Code: [email or web address]  Expires: 2018  9:21 AM (This is the date your Ultimate Shopper access code will )    4. Enter the last four digits of your Social Security Number (xxxx) and Date of Birth (mm/dd/yyyy) as indicated and click Submit. You will be taken to the next sign-up page. 5. Create a Ultimate Shopper ID. This will be your Ultimate Shopper login ID and cannot be changed, so think of one that is secure and easy to remember. 6. Create a Ultimate Shopper password. You can change your password at any time. 7. Enter your Password Reset Question and Answer. This can be used at a later time if you forget your password. 8. Enter your e-mail address. You will receive e-mail notification when new information is available in [address] 9. Click Sign Up. You can now view and download portions of your medical record. 10. Click the Download Summary menu link to download a portable copy of your medical information. Additional Information    If you have questions, please visit the Frequently Asked Questions section of the Ultimate Shopper website at [email or web address] Remember, Ultimate Shopper is NOT to be used for urgent needs. For medical emergencies, dial 911. Purine-Restricted Diet: Care Instructions  Your Care Instructions    Purines are substances that are found in some foods. Your body turns purines into uric acid. High levels of uric acid can cause gout, which is a form of arthritis that causes pain and inflammation in joints.   You may be able to help control the amount of uric acid in your body by limiting high-purine foods in your diet. Follow-up care is a key part of your treatment and safety. Be sure to make and go to all appointments, and call your doctor if you are having problems. It's also a good idea to know your test results and keep a list of the medicines you take. How can you care for yourself at home? · Plan your meals and snacks around foods that are low in purines and are safe for you to eat. These foods include:  ¨ Green vegetables and tomatoes. ¨ Fruits. ¨ Whole-grain breads, rice, and cereals. ¨ Eggs, peanut butter, and nuts. ¨ Low-fat milk, cheese, and other milk products. ¨ Popcorn. ¨ Gelatin desserts, chocolate, cocoa, and cakes and sweets, in small amounts. · You can eat certain foods that are medium-high in purines, but eat them only once in a while. These foods include:  ¨ Legumes, such as dried beans and dried peas. You can have 1 cup cooked legumes each day. ¨ Asparagus, cauliflower, spinach, mushrooms, and green peas. ¨ Fish and seafood (other than very high-purine seafood). ¨ Oatmeal, wheat bran, and wheat germ. · Limit very high-purine foods, including:  ¨ Organ meats, such as liver, kidneys, sweetbreads, and brains. ¨ Meats, including gorman, beef, pork, and lamb. ¨ Game meats and any other meats in large amounts. ¨ Anchovies, sardines, herring, mackerel, and scallops. ¨ Gravy. ¨ Beer. Where can you learn more? Go to http://elli-vani.info/. Enter F448 in the search box to learn more about \"Purine-Restricted Diet: Care Instructions. \"  Current as of: May 12, 2017  Content Version: 11.4  © 7802-2482 [employer]. Care instructions adapted under license by [employer] (which disclaims liability or warranty for this information). If you have questions about a medical condition or this instruction, always ask your healthcare professional. [place] [address] any warranty or liability for your use of this information.

## 2018-02-19 ENCOUNTER — HOSPITAL ENCOUNTER (EMERGENCY)
Age: 38
Discharge: HOME | End: 2018-02-19
Attending: EMERGENCY MEDICINE
Payer: SELF-PAY

## 2018-02-19 VITALS
HEART RATE: 85 BPM | DIASTOLIC BLOOD PRESSURE: 94 MMHG | TEMPERATURE: 97.9 F | OXYGEN SATURATION: 95 % | WEIGHT: 284 LBS | RESPIRATION RATE: 16 BRPM | SYSTOLIC BLOOD PRESSURE: 165 MMHG | BODY MASS INDEX: 38.47 KG/M2 | HEIGHT: 72 IN

## 2018-02-19 DIAGNOSIS — M10.9 ACUTE GOUT OF LEFT KNEE, UNSPECIFIED CAUSE: ICD-10-CM

## 2018-02-19 LAB
ANION GAP BLD CALC-SCNC: 16 MMOL/L (ref 10–20)
BUN BLD-MCNC: 10 MG/DL (ref 7–18)
CA-I BLD-MCNC: 1.21 MMOL/L (ref 1.12–1.32)
CHLORIDE BLD-SCNC: 102 MMOL/L (ref 100–108)
CO2 BLD-SCNC: 28 MMOL/L (ref 19–24)
CREAT UR-MCNC: 1.2 MG/DL (ref 0.6–1.3)
GLUCOSE BLD STRIP.AUTO-MCNC: 101 MG/DL (ref 74–106)
HCT VFR BLD CALC: 45 % (ref 36–49)
HGB BLD-MCNC: 15.3 G/DL (ref 12–16)
POTASSIUM BLD-SCNC: 3.7 MMOL/L (ref 3.5–5.5)
SODIUM BLD-SCNC: 142 MMOL/L (ref 136–145)

## 2018-02-19 PROCEDURE — 74011250637 HC RX REV CODE- 250/637: Performed by: EMERGENCY MEDICINE

## 2018-02-19 PROCEDURE — 99283 EMERGENCY DEPT VISIT LOW MDM: CPT

## 2018-02-19 PROCEDURE — 80047 BASIC METABLC PNL IONIZED CA: CPT

## 2018-02-19 PROCEDURE — 74011636637 HC RX REV CODE- 636/637: Performed by: EMERGENCY MEDICINE

## 2018-02-19 RX ORDER — PREDNISONE 20 MG/1
TABLET ORAL
Qty: 12 TAB | Refills: 0 | Status: SHIPPED | OUTPATIENT
Start: 2018-02-19 | End: 2018-07-11 | Stop reason: ALTCHOICE

## 2018-02-19 RX ORDER — COLCHICINE 0.6 MG/1
CAPSULE ORAL
Qty: 3 CAP | Refills: 1 | Status: SHIPPED | OUTPATIENT
Start: 2018-02-19 | End: 2018-07-11

## 2018-02-19 RX ORDER — LISINOPRIL 20 MG/1
20 TABLET ORAL DAILY
Qty: 30 TAB | Refills: 1 | Status: SHIPPED | OUTPATIENT
Start: 2018-02-19 | End: 2019-04-09 | Stop reason: SDUPTHER

## 2018-02-19 RX ORDER — HYDROCODONE BITARTRATE AND ACETAMINOPHEN 5; 325 MG/1; MG/1
1 TABLET ORAL
Qty: 8 TAB | Refills: 0 | Status: SHIPPED | OUTPATIENT
Start: 2018-02-19 | End: 2018-07-11

## 2018-02-19 RX ORDER — ACETAMINOPHEN AND CODEINE PHOSPHATE 300; 30 MG/1; MG/1
1 TABLET ORAL
Status: COMPLETED | OUTPATIENT
Start: 2018-02-19 | End: 2018-02-19

## 2018-02-19 RX ORDER — PREDNISONE 20 MG/1
60 TABLET ORAL
Status: COMPLETED | OUTPATIENT
Start: 2018-02-19 | End: 2018-02-19

## 2018-02-19 RX ADMIN — PREDNISONE 60 MG: 20 TABLET ORAL at 12:30

## 2018-02-19 RX ADMIN — ACETAMINOPHEN AND CODEINE PHOSPHATE 1 TABLET: 300; 30 TABLET ORAL at 12:30

## 2018-02-19 NOTE — ED PROVIDER NOTES
EMERGENCY DEPARTMENT HISTORY AND PHYSICAL EXAM    11:35 AM      Date: 2/19/2018  Patient Name: Rossy Mauricio    History of Presenting Illness     Chief Complaint   Patient presents with    Gout         History Provided By: Patient    Chief Complaint: Left foot gout  Duration: 4 Days  Timing:  Acute  Location: Left foot   Quality: Sharp  Severity: Moderate  Modifying Factors: Left foot pain precipitated with palpation  Associated Symptoms: arthralgia, joint swelling and myalgia      Additional History (Context): Rossy Mauricio is a 42 y.o. male with HTN, renal insufficiency, HLD and right knee pain who presents to the ED with a chief complaint of acute exacerbation of gout in left foot since the last 4 days with associated symptoms of arthralgia, joint swelling and myalgia. Patient states \"gout flared up in my foot and it feels like I twist my ankle. \" Patient also states he works for a [employer] and has been a regular [employer] consumer. Patient reports that his left foot pain is precipitated with palpation. Patient does not state any other modifying factors. Patient denies any other symptoms or complaints. PCP: Lexi Mercado MD    Current Outpatient Prescriptions   Medication Sig Dispense Refill    predniSONE (DELTASONE) 20 mg tablet 60mg x 3 days  40mg x 3 days  20 mg x 3 days  10 mg x 3 days. 12 Tab 0    HYDROcodone-acetaminophen (NORCO) 5-325 mg per tablet Take 1 Tab by mouth every six (6) hours as needed for Pain. Max Daily Amount: 4 Tabs. 8 Tab 0    lisinopril (PRINIVIL, ZESTRIL) 20 mg tablet Take 1 Tab by mouth daily.  30 Tab 1    colchicine (MITIGARE) 0.6 mg capsule 2 tabs x 1 PO then repeat in an hour 3 Cap 1       Past History     Past Medical History:  Past Medical History:   Diagnosis Date    Elevated uric acid in blood 03/05/2015    8.6 mg/dL    Hyperlipidemia     Hypertension     Medical non-compliance     Renal insufficiency 04/06/2011    BUN/sCr: 14/1.4, GFR >60    Right knee pain 09/20/2010    Plain Film NEG    Right knee pain 04/10/2015    Plain Film NEG       Past Surgical History:  Past Surgical History:   Procedure Laterality Date    HX CYST REMOVAL      HX TONSILLECTOMY         Family History:  No family history on file. Social History:  Social History   Substance Use Topics    Smoking status: Never Smoker    Smokeless tobacco: Never Used    Alcohol use Yes      Comment: socially       Allergies:  No Known Allergies      Review of Systems     Review of Systems   Constitutional: Negative for activity change, fatigue and fever. HENT: Negative for congestion and rhinorrhea. Eyes: Negative for visual disturbance. Respiratory: Negative for shortness of breath. Cardiovascular: Negative for chest pain and palpitations. Gastrointestinal: Negative for abdominal pain, diarrhea, nausea and vomiting. Genitourinary: Negative for dysuria and hematuria. Musculoskeletal: Positive for arthralgias, joint swelling and myalgias. Negative for back pain. Skin: Negative for rash. Neurological: Negative for dizziness, weakness and light-headedness. All other systems reviewed and are negative. Physical Exam     Patient Vitals for the past 12 hrs:   Temp Pulse Resp BP SpO2   02/19/18 1117 97.9 °F (36.6 °C) 85 16 (!) 165/94 95 %     Physical Exam   Constitutional: He is oriented to person, place, and time. He appears well-developed and well-nourished. No distress. HENT:   Head: Normocephalic and atraumatic. Right Ear: External ear normal.   Left Ear: External ear normal.   Nose: Nose normal.   Mouth/Throat: Oropharynx is clear and moist.   Eyes: Conjunctivae and EOM are normal. Pupils are equal, round, and reactive to light. No scleral icterus. Neck: Normal range of motion. Neck supple. No JVD present. No tracheal deviation present. No thyromegaly present. Cardiovascular: Normal rate, regular rhythm, normal heart sounds and intact distal pulses.   Exam reveals no gallop and no friction rub. No murmur heard. Pulmonary/Chest: Effort normal and breath sounds normal. He exhibits no tenderness. Abdominal: Soft. Bowel sounds are normal. He exhibits no distension. There is no tenderness. There is no rebound and no guarding. Musculoskeletal: Normal range of motion. He exhibits no edema or tenderness. Tenderness to mid and hind left foot with mild swelling. Lymphadenopathy:     He has no cervical adenopathy. Neurological: He is alert and oriented to person, place, and time. No cranial nerve deficit. Coordination normal.   No sensory loss, Gait normal, Motor 5/5  Good DP pulses. Skin: Skin is warm and dry. Psychiatric: He has a normal mood and affect. His behavior is normal. Judgment and thought content normal.   Nursing note and vitals reviewed. Diagnostic Study Results     Labs -  Recent Results (from the past 12 hour(s))   POC CHEM8    Collection Time: 02/19/18 12:26 PM   Result Value Ref Range    CO2, POC 28 (H) 19 - 24 MMOL/L    Glucose,  74 - 106 MG/DL    BUN, POC 10 7 - 18 MG/DL    Creatinine, POC 1.2 0.6 - 1.3 MG/DL    GFRAA, POC >60 >60 ml/min/1.73m2    GFRNA, POC >60 >60 ml/min/1.73m2    Sodium,  136 - 145 MMOL/L    Potassium, POC 3.7 3.5 - 5.5 MMOL/L    Calcium, ionized (POC) 1.21 1.12 - 1.32 MMOL/L    Chloride,  100 - 108 MMOL/L    Anion gap, POC 16 10 - 20      Hematocrit, POC 45 36 - 49 %    Hemoglobin, POC 15.3 12 - 16 G/DL       Radiologic Studies -   No orders to display     Medical Decision Making     Provider Notes (Medical Decision Making): Rhina Winter is a 42 y.o. male with history of gouty arthritis, HTN, medication and diet non-compliance returns with left foot pain. Patient states he is running out of his BP medications and has not had his creatinine checked in the last 1 year.  I filled the patient's Vicodin and gave prescription for Colchicine and Prednisone along with a follow-up with  and [facility] Joe Gonzalez. Patient is also given gout diet recommendation and asked to return if pain worsens. I am the first provider for this patient. I reviewed the vital signs, available nursing notes, past medical history, past surgical history, family history and social history. Vital Signs-Reviewed the patient's vital signs. Records Reviewed: Nursing Notes and Old Medical Records (Time of Review: 11:35 AM)    ED Course: Progress Notes, Reevaluation, and Consults:  12:52 PM  Renal function reassuring and will discharge the patient with referral to  and [facility]. Diagnosis     Clinical Impression:   1. Acute gout of left knee, unspecified cause        Disposition: Discharge     Follow-up Information     None           Patient's Medications   Start Taking    No medications on file   Continue Taking    No medications on file   These Medications have changed    Modified Medication Previous Medication    COLCHICINE (MITIGARE) 0.6 MG CAPSULE colchicine (MITIGARE) 0.6 mg capsule       2 tabs x 1 PO then repeat in an hour    Take 1 Cap by mouth daily. HYDROCODONE-ACETAMINOPHEN (NORCO) 5-325 MG PER TABLET HYDROcodone-acetaminophen (NORCO) 5-325 mg per tablet       Take 1 Tab by mouth every six (6) hours as needed for Pain. Max Daily Amount: 4 Tabs. Take 1 Tab by mouth every six (6) hours as needed for Pain. Max Daily Amount: 4 Tabs. LISINOPRIL (PRINIVIL, ZESTRIL) 20 MG TABLET lisinopril (PRINIVIL, ZESTRIL) 20 mg tablet       Take 1 Tab by mouth daily. Take 1 Tab by mouth daily. PREDNISONE (DELTASONE) 20 MG TABLET predniSONE (DELTASONE) 20 mg tablet       60mg x 3 days  40mg x 3 days  20 mg x 3 days  10 mg x 3 days. 60mg x 3 days  40mg x 3 days  20 mg x 3 days  10 mg x 3 days.    Stop Taking    No medications on file     _______________________________    Attestations:  Scribe Attestation     [address] acting as a scribe for and in the presence of Dl Wills Melinda Johnston DO      February 19, 2018 at 11:35 AM       Provider Attestation:      I personally performed the services described in the documentation, reviewed the documentation, as recorded by the scribe in my presence, and it accurately and completely records my words and actions.  February 19, 2018 at 11:35 AM - Damion Pelayo DO    _______________________________

## 2018-02-19 NOTE — DISCHARGE INSTRUCTIONS
Gout: Care Instructions  Your Care Instructions    Gout is a form of arthritis caused by a buildup of uric acid crystals in a joint. It causes sudden attacks of pain, swelling, redness, and stiffness, usually in one joint, especially the big toe. Gout usually comes on without a cause. But it can be brought on by drinking alcohol (especially beer) or eating seafood and red meat. Taking certain medicines, such as diuretics or aspirin, also can bring on an attack of gout. Taking your medicines as prescribed and following up with your doctor regularly can help you avoid gout attacks in the future. Follow-up care is a key part of your treatment and safety. Be sure to make and go to all appointments, and call your doctor if you are having problems. It's also a good idea to know your test results and keep a list of the medicines you take. How can you care for yourself at home? · If the joint is swollen, put ice or a cold pack on the area for 10 to 20 minutes at a time. Put a thin cloth between the ice and your skin. · Prop up the sore limb on a pillow when you ice it or anytime you sit or lie down during the next 3 days. Try to keep it above the level of your heart. This will help reduce swelling. · Rest sore joints. Avoid activities that put weight or strain on the joints for a few days. Take short rest breaks from your regular activities during the day. · Take your medicines exactly as prescribed. Call your doctor if you think you are having a problem with your medicine. · Take pain medicines exactly as directed. ¨ If the doctor gave you a prescription medicine for pain, take it as prescribed. ¨ If you are not taking a prescription pain medicine, ask your doctor if you can take an over-the-counter medicine. · Eat less seafood and red meat. · Check with your doctor before drinking alcohol. · Losing weight, if you are overweight, may help reduce attacks of gout. But do not go on a [employer]. \" Losing a lot of weight in a short amount of time can cause a gout attack. When should you call for help? Call your doctor now or seek immediate medical care if:  ? · You have a fever. ? · The joint is so painful you cannot use it. ? · You have sudden, unexplained swelling, redness, warmth, or severe pain in one or more joints. ? Watch closely for changes in your health, and be sure to contact your doctor if:  ? · You have joint pain. ? · Your symptoms get worse or are not improving after 2 or 3 days. Where can you learn more? Go to http://elli-vani.info/. Enter U270 in the search box to learn more about \"Gout: Care Instructions. \"  Current as of: October 31, 2016  Content Version: 11.4  © 8252-8218 [employer]. Care instructions adapted under license by [employer] (which disclaims liability or warranty for this information). If you have questions about a medical condition or this instruction, always ask your healthcare professional. [place] [address] any warranty or liability for your use of this information. Purine-Restricted Diet: Care Instructions  Your Care Instructions    Purines are substances that are found in some foods. Your body turns purines into uric acid. High levels of uric acid can cause gout, which is a form of arthritis that causes pain and inflammation in joints. You may be able to help control the amount of uric acid in your body by limiting high-purine foods in your diet. Follow-up care is a key part of your treatment and safety. Be sure to make and go to all appointments, and call your doctor if you are having problems. It's also a good idea to know your test results and keep a list of the medicines you take. How can you care for yourself at home? · Plan your meals and snacks around foods that are low in purines and are safe for you to eat.  These foods include:  ¨ Green vegetables and tomatoes. ¨ Fruits. ¨ Whole-grain breads, rice, and cereals. ¨ Eggs, peanut butter, and nuts. ¨ Low-fat milk, cheese, and other milk products. ¨ Popcorn. ¨ Gelatin desserts, chocolate, cocoa, and cakes and sweets, in small amounts. · You can eat certain foods that are medium-high in purines, but eat them only once in a while. These foods include:  ¨ Legumes, such as dried beans and dried peas. You can have 1 cup cooked legumes each day. ¨ Asparagus, cauliflower, spinach, mushrooms, and green peas. ¨ Fish and seafood (other than very high-purine seafood). ¨ Oatmeal, wheat bran, and wheat germ. · Limit very high-purine foods, including:  ¨ Organ meats, such as liver, kidneys, sweetbreads, and brains. ¨ Meats, including gorman, beef, pork, and lamb. ¨ Game meats and any other meats in large amounts. ¨ Anchovies, sardines, herring, mackerel, and scallops. ¨ Gravy. ¨ Beer. Where can you learn more? Go to http://elli-vani.info/. Enter F448 in the search box to learn more about \"Purine-Restricted Diet: Care Instructions. \"  Current as of: May 12, 2017  Content Version: 11.4  © 6151-1088 [employer]. Care instructions adapted under license by [employer] (which disclaims liability or warranty for this information). If you have questions about a medical condition or this instruction, always ask your healthcare professional. [place] [address] any warranty or liability for your use of this information.

## 2018-07-11 ENCOUNTER — HOSPITAL ENCOUNTER (EMERGENCY)
Age: 38
Discharge: HOME | End: 2018-07-11
Attending: EMERGENCY MEDICINE
Payer: SELF-PAY

## 2018-07-11 VITALS
SYSTOLIC BLOOD PRESSURE: 159 MMHG | DIASTOLIC BLOOD PRESSURE: 106 MMHG | RESPIRATION RATE: 16 BRPM | HEART RATE: 85 BPM | TEMPERATURE: 98.4 F | OXYGEN SATURATION: 98 %

## 2018-07-11 DIAGNOSIS — M10.9 ACUTE GOUT OF LEFT KNEE, UNSPECIFIED CAUSE: ICD-10-CM

## 2018-07-11 DIAGNOSIS — M10.9 ACUTE GOUT INVOLVING TOE OF RIGHT FOOT, UNSPECIFIED CAUSE: Primary | ICD-10-CM

## 2018-07-11 PROCEDURE — 99283 EMERGENCY DEPT VISIT LOW MDM: CPT

## 2018-07-11 PROCEDURE — 74011250637 HC RX REV CODE- 250/637: Performed by: PHYSICIAN ASSISTANT

## 2018-07-11 RX ORDER — PREDNISONE 10 MG/1
TABLET ORAL
Qty: 21 TAB | Refills: 0 | Status: SHIPPED | OUTPATIENT
Start: 2018-07-11 | End: 2018-10-08

## 2018-07-11 RX ORDER — HYDROCODONE BITARTRATE AND ACETAMINOPHEN 5; 325 MG/1; MG/1
1-2 TABLET ORAL
Qty: 12 TAB | Refills: 0 | Status: SHIPPED | OUTPATIENT
Start: 2018-07-11 | End: 2018-10-08

## 2018-07-11 RX ORDER — COLCHICINE 0.6 MG/1
CAPSULE ORAL
Qty: 7 CAP | Refills: 1 | Status: SHIPPED | OUTPATIENT
Start: 2018-07-11 | End: 2018-10-08

## 2018-07-11 RX ORDER — COLCHICINE 0.6 MG/1
1.2 CAPSULE ORAL
Status: COMPLETED | OUTPATIENT
Start: 2018-07-11 | End: 2018-07-11

## 2018-07-11 RX ORDER — HYDROCODONE BITARTRATE AND ACETAMINOPHEN 5; 325 MG/1; MG/1
2 TABLET ORAL
Status: COMPLETED | OUTPATIENT
Start: 2018-07-11 | End: 2018-07-11

## 2018-07-11 RX ADMIN — COLCHICINE 1.2 MG: 0.6 CAPSULE ORAL at 10:30

## 2018-07-11 RX ADMIN — HYDROCODONE BITARTRATE AND ACETAMINOPHEN 2 TABLET: 5; 325 TABLET ORAL at 10:30

## 2018-07-11 NOTE — ED TRIAGE NOTES
Pt came to Er complaining of gout pain in right big toe. Pt states he hasn't been able to follow up w/ PCP due to insurance.

## 2018-07-11 NOTE — LETTER
[address] [place] [address] [facility] EMERGENCY DEPT 
[address] [facility] [address] 
[phone] Work/School Note Date: 7/11/2018 To Whom It May concern: Nereyda Castaneda was seen and treated today in the emergency room by the following provider(s): 
Attending Provider: Jack Wheat MD 
Physician Assistant: JOON Prajapati. Nereyda Castaneda may be excused from work on 7/11, 7/12. Sincerely, JOON Prajapati

## 2018-07-11 NOTE — ED NOTES
I have reviewed discharge instructions with the patient. The patient verbalized understanding. Pt left ED in NAD, ambulatory with a limping gait, safety intact, a&ox4.

## 2018-07-11 NOTE — ED PROVIDER NOTES
EMERGENCY DEPARTMENT HISTORY AND PHYSICAL EXAM 
 
Date: 7/11/2018 Patient Name: Cash Friday History of Presenting Illness Chief Complaint Patient presents with  Gout History Provided By: Patient Chief Complaint: foot pain Duration: 2 Days Timing:  Acute Location: right foot/ great toe Quality: Stabbing and Juice Annita Severity: 9 out of 10 Modifying Factors: walking, movement worsens pain; ate pork and drank alcohol Associated Symptoms: denies any other associated signs or symptoms Additional History (Context): Cash Frisaray is a 34 y.o. male with HTN, hyperlipidemia, gout who presents with right foot and right great toe pain x 2 days. Pt states pain similar to previous gout episodes. Pt admits to recent birthday celebration with cookout and alcohol intake. Eats \"big bite from [employer]\" every morning on his way to work. States previous gout flare up was in Feb from seafood salad. Denies foot injury, fever, chills or any other complaints or symptoms. PCP: Cade Cabrera MD 
 
Current Outpatient Prescriptions Medication Sig Dispense Refill  
 HYDROcodone-acetaminophen (NORCO) 5-325 mg per tablet Take 1-2 Tabs by mouth every six (6) hours as needed for Pain. Max Daily Amount: 8 Tabs. 12 Tab 0  
 colchicine (MITIGARE) 0.6 mg capsule 1 tab today, then 1 tab BID until pain resolves. 7 Cap 1  
 lisinopril (PRINIVIL, ZESTRIL) 20 mg tablet Take 1 Tab by mouth daily. 30 Tab 1 Past History Past Medical History: 
Past Medical History:  
Diagnosis Date  Elevated uric acid in blood 03/05/2015  
 8.6 mg/dL  Hyperlipidemia  Hypertension  Medical non-compliance  Renal insufficiency 04/06/2011 BUN/sCr: 14/1.4, GFR >60  Right knee pain 09/20/2010 Plain Film NEG  
 Right knee pain 04/10/2015 Plain Film NEG Past Surgical History: 
Past Surgical History:  
Procedure Laterality Date  HX CYST REMOVAL    
 HX TONSILLECTOMY Family History: No family history on file. Social History: 
Social History Substance Use Topics  Smoking status: Never Smoker  Smokeless tobacco: Never Used  Alcohol use Yes Comment: socially Allergies: 
No Known Allergies Review of Systems Review of Systems Constitutional: Negative for chills and fever. Musculoskeletal: Positive for arthralgias and joint swelling. All other systems reviewed and are negative. All Other Systems Negative Physical Exam  
 
Vitals:  
 07/11/18 8217 07/11/18 9013 BP: (!) 159/106 Pulse: 84 Resp: 16 Temp: 98.2 °F (36.8 °C) SpO2: 98% 98% Physical Exam  
Constitutional: He appears well-developed and well-nourished. No distress. HENT:  
Head: Normocephalic and atraumatic. Right Ear: External ear normal.  
Left Ear: External ear normal.  
Mouth/Throat: Oropharynx is clear and moist.  
Eyes: Conjunctivae are normal.  
Neck: Normal range of motion. Neck supple. Cardiovascular: Normal rate and regular rhythm. Pulmonary/Chest: Effort normal and breath sounds normal.  
Musculoskeletal:  
     Feet: 
 
Neurological: He is alert. Skin: Skin is warm and dry. He is not diaphoretic. Psychiatric: He has a normal mood and affect. Diagnostic Study Results Labs - No results found for this or any previous visit (from the past 12 hour(s)). Radiologic Studies - No orders to display CT Results  (Last 48 hours) None CXR Results  (Last 48 hours) None Medical Decision Making I am the first provider for this patient. I reviewed the vital signs, available nursing notes, past medical history, past surgical history, family history and social history. Vital Signs-Reviewed the patient's vital signs. Pulse Oximetry Analysis - 98% on RA Records Reviewed: Nursing Notes, Old Medical Records and Previous Laboratory Studies Procedures: 
Procedures Provider Notes (Medical Decision Making): Pt c/o atraumatic right great toe/foot pain x 2 days consistent with previous gout episodes. Admits to multiple diet triggers. Rt 1st MTP joint is red, exquisitely tender, swollen. Doubt septic joint. Colchicine worked in the past per pt. Will add norco. PCP f/u this week. MED RECONCILIATION: 
No current facility-administered medications for this encounter. Current Outpatient Prescriptions Medication Sig  
 HYDROcodone-acetaminophen (NORCO) 5-325 mg per tablet Take 1-2 Tabs by mouth every six (6) hours as needed for Pain. Max Daily Amount: 8 Tabs.  colchicine (MITIGARE) 0.6 mg capsule 1 tab today, then 1 tab BID until pain resolves.  lisinopril (PRINIVIL, ZESTRIL) 20 mg tablet Take 1 Tab by mouth daily. Disposition: 
discharge DISCHARGE NOTE:  
 
Pt has been reexamined. Patient has no new complaints, changes, or physical findings. Care plan outlined and precautions discussed. All medications were reviewed with the patient; will d/c home with colchicine, norco. All of pt's questions and concerns were addressed. Patient was instructed and agrees to follow up with pcp, as well as to return to the ED upon further deterioration. Patient is ready to go home. Follow-up Information Follow up With Details Comments Contact Info [address] [facility] [address] [address] 
[phone] Current Discharge Medication List  
  
CONTINUE these medications which have CHANGED Details HYDROcodone-acetaminophen (NORCO) 5-325 mg per tablet Take 1-2 Tabs by mouth every six (6) hours as needed for Pain. Max Daily Amount: 8 Tabs. Qty: 12 Tab, Refills: 0 Associated Diagnoses: Acute gout of left knee, unspecified cause  
  
colchicine (MITIGARE) 0.6 mg capsule 1 tab today, then 1 tab BID until pain resolves. Qty: 7 Cap, Refills: 1 CONTINUE these medications which have NOT CHANGED  Details  
lisinopril (PRINIVIL, ZESTRIL) 20 mg tablet Take 1 Tab by mouth daily. Qty: 30 Tab, Refills: 1 STOP taking these medications  
  
 predniSONE (DELTASONE) 20 mg tablet Comments:  
Reason for Stopping:   
   
  
 
 
 
Diagnosis Clinical Impression: 1. Acute gout involving toe of right foot, unspecified cause 2. Acute gout of left knee, unspecified cause

## 2018-07-11 NOTE — DISCHARGE INSTRUCTIONS
Purine-Restricted Diet: Care Instructions  Your Care Instructions    Purines are substances that are found in some foods. Your body turns purines into uric acid. High levels of uric acid can cause gout, which is a form of arthritis that causes pain and inflammation in joints. You may be able to help control the amount of uric acid in your body by limiting high-purine foods in your diet. Follow-up care is a key part of your treatment and safety. Be sure to make and go to all appointments, and call your doctor if you are having problems. It's also a good idea to know your test results and keep a list of the medicines you take. How can you care for yourself at home? · Plan your meals and snacks around foods that are low in purines and are safe for you to eat. These foods include:  ¨ Green vegetables and tomatoes. ¨ Fruits. ¨ Whole-grain breads, rice, and cereals. ¨ Eggs, peanut butter, and nuts. ¨ Low-fat milk, cheese, and other milk products. ¨ Popcorn. ¨ Gelatin desserts, chocolate, cocoa, and cakes and sweets, in small amounts. · You can eat certain foods that are medium-high in purines, but eat them only once in a while. These foods include:  ¨ Legumes, such as dried beans and dried peas. You can have 1 cup cooked legumes each day. ¨ Asparagus, cauliflower, spinach, mushrooms, and green peas. ¨ Fish and seafood (other than very high-purine seafood). ¨ Oatmeal, wheat bran, and wheat germ. · Limit very high-purine foods, including:  ¨ Organ meats, such as liver, kidneys, sweetbreads, and brains. ¨ Meats, including gorman, beef, pork, and lamb. ¨ Game meats and any other meats in large amounts. ¨ Anchovies, sardines, herring, mackerel, and scallops. ¨ Gravy. ¨ Beer. Where can you learn more? Go to http://elli-vani.info/. Enter F448 in the search box to learn more about \"Purine-Restricted Diet: Care Instructions. \"  Current as of:  May 12, 2017  Content Version: 11.7  © 4311-5443 [employer]. Care instructions adapted under license by [employer] (which disclaims liability or warranty for this information). If you have questions about a medical condition or this instruction, always ask your healthcare professional. [address] any warranty or liability for your use of this information. Gout: Care Instructions  Your Care Instructions    Gout is a form of arthritis caused by a buildup of uric acid crystals in a joint. It causes sudden attacks of pain, swelling, redness, and stiffness, usually in one joint, especially the big toe. Gout usually comes on without a cause. But it can be brought on by drinking alcohol (especially beer) or eating seafood and red meat. Taking certain medicines, such as diuretics or aspirin, also can bring on an attack of gout. Taking your medicines as prescribed and following up with your doctor regularly can help you avoid gout attacks in the future. Follow-up care is a key part of your treatment and safety. Be sure to make and go to all appointments, and call your doctor if you are having problems. It's also a good idea to know your test results and keep a list of the medicines you take. How can you care for yourself at home? · If the joint is swollen, put ice or a cold pack on the area for 10 to 20 minutes at a time. Put a thin cloth between the ice and your skin. · Prop up the sore limb on a pillow when you ice it or anytime you sit or lie down during the next 3 days. Try to keep it above the level of your heart. This will help reduce swelling. · Rest sore joints. Avoid activities that put weight or strain on the joints for a few days. Take short rest breaks from your regular activities during the day. · Take your medicines exactly as prescribed. Call your doctor if you think you are having a problem with your medicine. · Take pain medicines exactly as directed.   ¨ If the doctor gave you a prescription medicine for pain, take it as prescribed. ¨ If you are not taking a prescription pain medicine, ask your doctor if you can take an over-the-counter medicine. · Eat less seafood and red meat. · Check with your doctor before drinking alcohol. · Losing weight, if you are overweight, may help reduce attacks of gout. But do not go on a [employer]. \" Losing a lot of weight in a short amount of time can cause a gout attack. When should you call for help? Call your doctor now or seek immediate medical care if:    · You have a fever.     · The joint is so painful you cannot use it.     · You have sudden, unexplained swelling, redness, warmth, or severe pain in one or more joints.    Watch closely for changes in your health, and be sure to contact your doctor if:    · You have joint pain.     · Your symptoms get worse or are not improving after 2 or 3 days. Where can you learn more? Go to http://elli-vani.info/. Enter K006 in the search box to learn more about \"Gout: Care Instructions. \"  Current as of: October 10, 2017  Content Version: 11.7  © 2634-3218 [employer]. Care instructions adapted under license by [employer] (which disclaims liability or warranty for this information). If you have questions about a medical condition or this instruction, always ask your healthcare professional. [address] any warranty or liability for your use of this information.

## 2018-10-08 ENCOUNTER — HOSPITAL ENCOUNTER (EMERGENCY)
Age: 38
Discharge: HOME | End: 2018-10-08
Attending: EMERGENCY MEDICINE
Payer: SELF-PAY

## 2018-10-08 VITALS
WEIGHT: 297.2 LBS | RESPIRATION RATE: 16 BRPM | DIASTOLIC BLOOD PRESSURE: 93 MMHG | HEART RATE: 66 BPM | OXYGEN SATURATION: 100 % | BODY MASS INDEX: 40.26 KG/M2 | SYSTOLIC BLOOD PRESSURE: 158 MMHG | TEMPERATURE: 98 F | HEIGHT: 72 IN

## 2018-10-08 DIAGNOSIS — M10.9 ACUTE GOUT OF RIGHT KNEE, UNSPECIFIED CAUSE: Primary | ICD-10-CM

## 2018-10-08 DIAGNOSIS — M10.9 ACUTE GOUT OF LEFT KNEE, UNSPECIFIED CAUSE: ICD-10-CM

## 2018-10-08 PROCEDURE — 99282 EMERGENCY DEPT VISIT SF MDM: CPT

## 2018-10-08 RX ORDER — COLCHICINE 0.6 MG/1
TABLET ORAL
Qty: 3 TAB | Refills: 0 | Status: SHIPPED | OUTPATIENT
Start: 2018-10-08 | End: 2018-11-25

## 2018-10-08 RX ORDER — PREDNISONE 10 MG/1
TABLET ORAL
Qty: 21 TAB | Refills: 0 | Status: SHIPPED | OUTPATIENT
Start: 2018-10-08 | End: 2018-11-25

## 2018-10-08 RX ORDER — HYDROCODONE BITARTRATE AND ACETAMINOPHEN 5; 325 MG/1; MG/1
1-2 TABLET ORAL
Qty: 5 TAB | Refills: 0 | Status: SHIPPED | OUTPATIENT
Start: 2018-10-08 | End: 2018-11-25

## 2018-10-08 NOTE — ED PROVIDER NOTES
EMERGENCY DEPARTMENT HISTORY AND PHYSICAL EXAM 
 
2:57 PM 
 
 
Date: 10/8/2018 Patient Name: Alex Strickland History of Presenting Illness Chief Complaint Patient presents with  Knee Pain History Provided By: Patient Chief Complaint: Right knee pain Duration: This week Timing:  Worsening Location: Right knee Quality: Aching Severity: Moderate Modifying Factors: Pain is exacerbated when he puts weight on it or when he bends his knee Associated Symptoms: Edema Additional History (Context): 2:59 PM Alex Strickland is a 34 y.o. male with h/o gout, HTN, and ETOH use who presents to ED complaining of worsening moderate right knee pain onset this week. Patient says that his right knee is swollen and painful. Reports that bending the knee or putting weight on the knee exacerbates the pain. He says that this may be in relation to his Hx of gout. His diet has gone bad since football season has started. He's been drinking beer and liquor and has been eating red meat. He thinks this may have triggered the swelling and knee pain. No other concerns or symptoms at this time. PCP: Manju Lira MD 
 
Current Outpatient Prescriptions Medication Sig Dispense Refill  
 HYDROcodone-acetaminophen (NORCO) 5-325 mg per tablet Take 1-2 Tabs by mouth every six (6) hours as needed for Pain. Max Daily Amount: 8 Tabs. 5 Tab 0  predniSONE (STERAPRED DS) 10 mg dose pack Per pack directions 21 Tab 0  
 colchicine 0.6 mg tablet Take 2 tabs x 1 then repeat in one hour 3 Tab 0  
 lisinopril (PRINIVIL, ZESTRIL) 20 mg tablet Take 1 Tab by mouth daily. 30 Tab 1 Past History Past Medical History: 
Past Medical History:  
Diagnosis Date  Elevated uric acid in blood 03/05/2015  
 8.6 mg/dL  Hyperlipidemia  Hypertension  Medical non-compliance  Renal insufficiency 04/06/2011 BUN/sCr: 14/1.4, GFR >60  Right knee pain 09/20/2010  Plain Film NEG  
  Right knee pain 04/10/2015 Plain Film NEG Past Surgical History: 
Past Surgical History:  
Procedure Laterality Date  HX CYST REMOVAL    
 HX TONSILLECTOMY Family History: 
History reviewed. No pertinent family history. Social History: 
Social History Substance Use Topics  Smoking status: Never Smoker  Smokeless tobacco: Never Used  Alcohol use Yes Comment: socially Allergies: 
No Known Allergies Review of Systems Review of Systems Constitutional: Negative for activity change, fatigue and fever. HENT: Negative for congestion and rhinorrhea. Eyes: Negative for visual disturbance. Respiratory: Negative for shortness of breath. Cardiovascular: Negative for chest pain and palpitations. Gastrointestinal: Negative for abdominal pain, diarrhea, nausea and vomiting. Genitourinary: Negative for dysuria and hematuria. Musculoskeletal: Positive for joint swelling. Negative for back pain. Skin: Negative for rash. Neurological: Negative for dizziness, weakness and light-headedness. All other systems reviewed and are negative. Physical Exam  
 
Visit Vitals  BP (!) 158/93 (BP 1 Location: Left arm)  Pulse 66  Temp 98 °F (36.7 °C)  Resp 16  
 Ht 6' (1.829 m)  Wt 134.8 kg (297 lb 3.2 oz)  SpO2 100%  BMI 40.31 kg/m2 Physical Exam  
Constitutional: He is oriented to person, place, and time. He appears well-developed and well-nourished. No distress. Obese HENT:  
Head: Normocephalic and atraumatic. Right Ear: External ear normal.  
Left Ear: External ear normal.  
Nose: Nose normal.  
Mouth/Throat: Oropharynx is clear and moist.  
Eyes: Conjunctivae and EOM are normal. Pupils are equal, round, and reactive to light. No scleral icterus. Neck: Normal range of motion. Neck supple. No JVD present. No tracheal deviation present. No thyromegaly present. Cardiovascular: Normal rate, regular rhythm, normal heart sounds and intact distal pulses. Exam reveals no gallop and no friction rub. No murmur heard. Pulmonary/Chest: Effort normal and breath sounds normal. He exhibits no tenderness. Abdominal: Soft. Bowel sounds are normal. He exhibits no distension. There is no tenderness. There is no rebound and no guarding. Musculoskeletal: He exhibits edema and tenderness. R knee with effusion with full passive and active ROM with pain, mild warmth Lymphadenopathy:  
  He has no cervical adenopathy. Neurological: He is alert and oriented to person, place, and time. No cranial nerve deficit. Coordination normal.  
No sensory loss, Gait normal, Motor 5/5 Skin: Skin is warm and dry. Psychiatric: He has a normal mood and affect. His behavior is normal. Judgment and thought content normal.  
Nursing note and vitals reviewed. Diagnostic Study Results Labs - No results found for this or any previous visit (from the past 12 hour(s)). Radiologic Studies - No orders to display Medical Decision Making I am the first provider for this patient. I reviewed the vital signs, available nursing notes, past medical history, past surgical history, family history and social history. Vital Signs-Reviewed the patient's vital signs. Pulse Oximetry Analysis -  100% on room air WNL Cardiac Monitor: 
Rate: 66 bpm  
 
Records Reviewed: Nursing Notes and Old Medical Records (Time of Review: 2:57 PM) ED Course: Progress Notes, Reevaluation, and Consults: 
 
Provider Notes (Medical Decision Making): Pt is a 40yo male with a hx of HTN, recurrent gout with poor compliance with his gout diet presents to the ED with complaint of R knee pain with effusion. He is afebrile with intact ROM making infection less likely. Will treat as gout and encourage close outpatient care and to return if at all worsened or concerned. Diagnosis Clinical Impression: 1. Acute gout of right knee, unspecified cause 2. Acute gout of left knee, unspecified cause Disposition: Discharged Follow-up Information Follow up With Details Comments Contact Info Pauly Oneal MD Go in 2 days  [facility] [place] [address] 
[phone] [address] [facility]  [address] [address] 
[phone] Discharge Medication List as of 10/8/2018  3:09 PM  
  
START taking these medications Details  
colchicine 0.6 mg tablet Take 2 tabs x 1 then repeat in one hour, Print, Disp-3 Tab, R-0  
  
  
CONTINUE these medications which have CHANGED Details HYDROcodone-acetaminophen (NORCO) 5-325 mg per tablet Take 1-2 Tabs by mouth every six (6) hours as needed for Pain. Max Daily Amount: 8 Tabs., Print, Disp-5 Tab, R-0  
  
predniSONE (STERAPRED DS) 10 mg dose pack Per pack directions, Print, Disp-21 Tab, R-0  
  
  
CONTINUE these medications which have NOT CHANGED Details  
lisinopril (PRINIVIL, ZESTRIL) 20 mg tablet Take 1 Tab by mouth daily. , Print, Disp-30 Tab, R-1  
  
  
STOP taking these medications  
  
 colchicine (MITIGARE) 0.6 mg capsule Comments:  
Reason for Stopping:   
   
  
 
_______________________________ Attestations: 
Scribe Attestation Suresh Garcia acting as a scribe for and in the presence of Peg Urbano MD     
October 08, 2018 at 2:57 PM 
    
Provider Attestation:     
I personally performed the services described in the documentation, reviewed the documentation, as recorded by the scribe in my presence, and it accurately and completely records my words and actions. October 08, 2018 at 2:57 PM - Peg Urbano MD   
_______________________________

## 2018-10-08 NOTE — ED NOTES
GALILEA received a referral from the  for this pt. Dio currently works but is uninsured and has no pcp. This pt. has been non-compliant to the service care plan that has been established by the [facility]. GALILEA discussed the importance on following up with a PCP for his Gout. GALILEA explained that this is will be the last attempt to connect him with a PCP. Pt. was given a vee [facility] application. Pt. was informed that an ID, proof of residence and proof of any other income. Pt. stated he has to go to his job to retrieve a copy of his paystub. Pt. states he will return tomorrow with the required documents. After documents are returned [facility] will give the pt. a [facility] application and schedule him an appointment at that time.

## 2018-11-25 ENCOUNTER — HOSPITAL ENCOUNTER (EMERGENCY)
Age: 38
Discharge: HOME | End: 2018-11-25
Attending: EMERGENCY MEDICINE
Payer: SELF-PAY

## 2018-11-25 VITALS
SYSTOLIC BLOOD PRESSURE: 157 MMHG | HEART RATE: 70 BPM | DIASTOLIC BLOOD PRESSURE: 94 MMHG | OXYGEN SATURATION: 94 % | TEMPERATURE: 98 F | RESPIRATION RATE: 16 BRPM

## 2018-11-25 DIAGNOSIS — M10.061 ACUTE IDIOPATHIC GOUT OF RIGHT KNEE: Primary | ICD-10-CM

## 2018-11-25 DIAGNOSIS — M25.561 ACUTE PAIN OF RIGHT KNEE: ICD-10-CM

## 2018-11-25 DIAGNOSIS — R03.0 ELEVATED BLOOD PRESSURE READING: ICD-10-CM

## 2018-11-25 PROCEDURE — 99282 EMERGENCY DEPT VISIT SF MDM: CPT

## 2018-11-25 RX ORDER — ACETAMINOPHEN AND CODEINE PHOSPHATE 300; 30 MG/1; MG/1
1 TABLET ORAL
Qty: 12 TAB | Refills: 0 | Status: SHIPPED | OUTPATIENT
Start: 2018-11-25 | End: 2019-04-09

## 2018-11-25 RX ORDER — INDOMETHACIN 25 MG/1
50 CAPSULE ORAL 3 TIMES DAILY
Qty: 60 CAP | Refills: 0 | Status: SHIPPED | OUTPATIENT
Start: 2018-11-25 | End: 2019-04-09

## 2018-11-25 NOTE — DISCHARGE INSTRUCTIONS
Elevated Blood Pressure: Care Instructions  Your Care Instructions    Blood pressure is a measure of how hard the blood pushes against the walls of your arteries. It's normal for blood pressure to go up and down throughout the day. But if it stays up over time, you have high blood pressure. Two numbers tell you your blood pressure. The first number is the systolic pressure. It shows how hard the blood pushes when your heart is pumping. The second number is the diastolic pressure. It shows how hard the blood pushes between heartbeats, when your heart is relaxed and filling with blood. An ideal blood pressure in adults is less than 120/80 (say \"120 over 80\"). High blood pressure is 140/90 or higher. You have high blood pressure if your top number is 140 or higher or your bottom number is 90 or higher, or both. The main test for high blood pressure is simple, fast, and painless. To diagnose high blood pressure, your doctor will test your blood pressure at different times. After testing your blood pressure, your doctor may ask you to test it again when you are home. If you are diagnosed with high blood pressure, you can work with your doctor to make a long-term plan to manage it. Follow-up care is a key part of your treatment and safety. Be sure to make and go to all appointments, and call your doctor if you are having problems. It's also a good idea to know your test results and keep a list of the medicines you take. How can you care for yourself at home? · Do not smoke. Smoking increases your risk for heart attack and stroke. If you need help quitting, talk to your doctor about stop-smoking programs and medicines. These can increase your chances of quitting for good. · Stay at a healthy weight. · Try to limit how much sodium you eat to less than 2,300 milligrams (mg) a day. Your doctor may ask you to try to eat less than 1,500 mg a day. · Be physically active.  Get at least 30 minutes of exercise on most days of the week. Walking is a good choice. You also may want to do other activities, such as running, swimming, cycling, or playing tennis or team sports. · Avoid or limit alcohol. Talk to your doctor about whether you can drink any alcohol. · Eat plenty of fruits, vegetables, and low-fat dairy products. Eat less saturated and total fats. · Learn how to check your blood pressure at home. When should you call for help? Call your doctor now or seek immediate medical care if:  ? · Your blood pressure is much higher than normal (such as 180/110 or higher). ? · You think high blood pressure is causing symptoms such as:  ¨ Severe headache. ¨ Blurry vision. ? Watch closely for changes in your health, and be sure to contact your doctor if:  ? · You do not get better as expected. Where can you learn more? Go to http://elliTexticvani.info/. Enter H591 in the search box to learn more about \"Elevated Blood Pressure: Care Instructions. \"  Current as of: September 21, 2016  Content Version: 11.4  © 9327-7856 [employer]. Care instructions adapted under license by [employer] (which disclaims liability or warranty for this information). If you have questions about a medical condition or this instruction, always ask your healthcare professional. [address] any warranty or liability for your use of this information. Gout: Care Instructions  Your Care Instructions    Gout is a form of arthritis caused by a buildup of uric acid crystals in a joint. It causes sudden attacks of pain, swelling, redness, and stiffness, usually in one joint, especially the big toe. Gout usually comes on without a cause. But it can be brought on by drinking alcohol (especially beer) or eating seafood and red meat. Taking certain medicines, such as diuretics or aspirin, also can bring on an attack of gout.   Taking your medicines as prescribed and following up with your doctor regularly can help you avoid gout attacks in the future. Follow-up care is a key part of your treatment and safety. Be sure to make and go to all appointments, and call your doctor if you are having problems. It's also a good idea to know your test results and keep a list of the medicines you take. How can you care for yourself at home? · If the joint is swollen, put ice or a cold pack on the area for 10 to 20 minutes at a time. Put a thin cloth between the ice and your skin. · Prop up the sore limb on a pillow when you ice it or anytime you sit or lie down during the next 3 days. Try to keep it above the level of your heart. This will help reduce swelling. · Rest sore joints. Avoid activities that put weight or strain on the joints for a few days. Take short rest breaks from your regular activities during the day. · Take your medicines exactly as prescribed. Call your doctor if you think you are having a problem with your medicine. · Take pain medicines exactly as directed. ? If the doctor gave you a prescription medicine for pain, take it as prescribed. ? If you are not taking a prescription pain medicine, ask your doctor if you can take an over-the-counter medicine. · Eat less seafood and red meat. · Check with your doctor before drinking alcohol. · Losing weight, if you are overweight, may help reduce attacks of gout. But do not go on a [employer]. \" Losing a lot of weight in a short amount of time can cause a gout attack. When should you call for help? Call your doctor now or seek immediate medical care if:    · You have a fever.     · The joint is so painful you cannot use it.     · You have sudden, unexplained swelling, redness, warmth, or severe pain in one or more joints.    Watch closely for changes in your health, and be sure to contact your doctor if:    · You have joint pain.     · Your symptoms get worse or are not improving after 2 or 3 days. Where can you learn more?   Go to http://elli-vani.info/. Enter J239 in the search box to learn more about \"Gout: Care Instructions. \"  Current as of: June 11, 2018  Content Version: 11.8  © 7753-9109 [employer]. Care instructions adapted under license by [employer] (which disclaims liability or warranty for this information). If you have questions about a medical condition or this instruction, always ask your healthcare professional. [address] any warranty or liability for your use of this information. Purine-Restricted Diet: Care Instructions  Your Care Instructions    Purines are substances that are found in some foods. Your body turns purines into uric acid. High levels of uric acid can cause gout, which is a form of arthritis that causes pain and inflammation in joints. You may be able to help control the amount of uric acid in your body by limiting high-purine foods in your diet. Follow-up care is a key part of your treatment and safety. Be sure to make and go to all appointments, and call your doctor if you are having problems. It's also a good idea to know your test results and keep a list of the medicines you take. How can you care for yourself at home? · Plan your meals and snacks around foods that are low in purines and are safe for you to eat. These foods include:  ? Green vegetables and tomatoes. ? Fruits. ? Whole-grain breads, rice, and cereals. ? Eggs, peanut butter, and nuts. ? Low-fat milk, cheese, and other milk products. ? Popcorn. ? Gelatin desserts, chocolate, cocoa, and cakes and sweets, in small amounts. · You can eat certain foods that are medium-high in purines, but eat them only once in a while. These foods include:  ? Legumes, such as dried beans and dried peas. You can have 1 cup cooked legumes each day. ? Asparagus, cauliflower, spinach, mushrooms, and green peas. ?  Fish and seafood (other than very high-purine seafood). ? Oatmeal, wheat bran, and wheat germ. · Limit very high-purine foods, including:  ? Organ meats, such as liver, kidneys, sweetbreads, and brains. ? Meats, including gorman, beef, pork, and lamb. ? Game meats and any other meats in large amounts. ? Anchovies, sardines, herring, mackerel, and scallops. ? Gravy. ? Beer. Where can you learn more? Go to http://elli-vani.info/. Enter F448 in the search box to learn more about \"Purine-Restricted Diet: Care Instructions. \"  Current as of: March 29, 2018  Content Version: 11.8  © 0150-4378 [employer], [employer]. Care instructions adapted under license by [employer] (which disclaims liability or warranty for this information). If you have questions about a medical condition or this instruction, always ask your healthcare professional. [place] [address] any warranty or liability for your use of this information.

## 2018-11-25 NOTE — ED TRIAGE NOTES
Patient complains of right knee pain on Friday and started getting worse Saturday. Patient has a hx of gout.

## 2018-11-25 NOTE — ED PROVIDER NOTES
EMERGENCY DEPARTMENT HISTORY AND PHYSICAL EXAM 
 
9:35 AM 
 
 
Date: 11/25/2018 Patient Name: Best Lara History of Presenting Illness Chief Complaint Patient presents with  Gout  Knee Pain History Provided By: Patient Chief Complaint: R knee pain and edema Duration:  Days Timing:  Progressive Location: R anterior knee Quality: Aching Severity: 8 out of 10 Modifying Factors: worse with movement Associated Symptoms: denies any other associated signs or symptoms Additional History (Context): Best Lara is a 34 y.o. male with hx of HTN, HLD, gout who presents with c/o R knee pain and swelling x 3 days. Pt notes progression of symptoms. \"It's my gout. It's not too bad yet but in another day it will be really red and swollen\". Notes he ate seafood and drank alcohol over the holiday weekend. Did not take any medication for symptoms PTA. Denies fever/chills, injury/trauma, numbness/tingling. Kianna Andujar PCP: Ruth Mane MD 
 
Current Outpatient Medications Medication Sig Dispense Refill  indomethacin (INDOCIN) 25 mg capsule Take 2 Caps by mouth three (3) times daily. With food 60 Cap 0  
 acetaminophen-codeine (TYLENOL-CODEINE #3) 300-30 mg per tablet Take 1 Tab by mouth every four (4) hours as needed for Pain. Max Daily Amount: 6 Tabs. 12 Tab 0  
 lisinopril (PRINIVIL, ZESTRIL) 20 mg tablet Take 1 Tab by mouth daily. 30 Tab 1 Past History Past Medical History: 
Past Medical History:  
Diagnosis Date  Elevated uric acid in blood 03/05/2015  
 8.6 mg/dL  Hyperlipidemia  Hypertension  Medical non-compliance  Renal insufficiency 04/06/2011 BUN/sCr: 14/1.4, GFR >60  Right knee pain 09/20/2010 Plain Film NEG  
 Right knee pain 04/10/2015 Plain Film NEG Past Surgical History: 
Past Surgical History:  
Procedure Laterality Date  HX CYST REMOVAL    
 HX TONSILLECTOMY Family History: No family history on file. Social History: 
Social History Tobacco Use  Smoking status: Never Smoker  Smokeless tobacco: Never Used Substance Use Topics  Alcohol use: Yes Comment: socially  Drug use: No  
 
 
Allergies: 
No Known Allergies Review of Systems Review of Systems Constitutional: Negative for chills and fever. Respiratory: Negative for shortness of breath. Cardiovascular: Negative for chest pain. Gastrointestinal: Negative for abdominal pain, nausea and vomiting. Musculoskeletal: Positive for arthralgias and joint swelling. Skin: Negative for rash. Neurological: Negative for weakness. All other systems reviewed and are negative. Physical Exam  
 
Visit Vitals BP (!) 157/94 (BP 1 Location: Left arm, BP Patient Position: At rest) Pulse 70 Temp 98 °F (36.7 °C) Resp 16 SpO2 94% Physical Exam  
Constitutional: He appears well-developed and well-nourished. No distress. HENT:  
Head: Normocephalic and atraumatic. Neck: Normal range of motion. Neck supple. Cardiovascular: Normal rate, regular rhythm, normal heart sounds and intact distal pulses. Exam reveals no gallop and no friction rub. No murmur heard. Pulmonary/Chest: Effort normal and breath sounds normal. No respiratory distress. He has no wheezes. He has no rales. Musculoskeletal: Normal range of motion. Right knee: He exhibits erythema (mild anterior knee erythema). He exhibits normal range of motion, no swelling, no effusion, no ecchymosis, no deformity and no laceration. Tenderness (anterior knee moderately TTP) found. Right lower leg: Normal.  
Full ROM of joint, no abrasions or wounds Neurological: He is alert. Skin: Skin is warm. No rash noted. He is not diaphoretic. Nursing note and vitals reviewed. Diagnostic Study Results Labs - No results found for this or any previous visit (from the past 12 hour(s)).  
 
Radiologic Studies -  
 No orders to display Medical Decision Making I am the first provider for this patient. I reviewed the vital signs, available nursing notes, past medical history, past surgical history, family history and social history. Vital Signs-Reviewed the patient's vital signs. Records Reviewed: Nursing Notes and Old Medical Records (Time of Review: 9:35 AM) ED Course: Progress Notes, Reevaluation, and Consults: 
9:35 AM  Reviewed plan with patient. Discussed need for close outpatient follow-up. Discussed strict return precautions, including fever, increased redness, or any other medical concerns. Provider Notes (Medical Decision Making): 35 yo M who presents due to R knee pain and erythema. AFebrile, VS stable, non-toxic appearing. Full ROM of joint. Pt states consistent with early gout flares in the past. No evidence of trauma or septic joint. Will d/c with Indomethacin, Tylenol with codeine. Diagnosis Clinical Impression: 1. Acute idiopathic gout of right knee 2. Elevated blood pressure reading 3. Acute pain of right knee Disposition: home Follow-up Information Follow up With Specialties Details Why Contact Info [facility] EMERGENCY DEPT Emergency Medicine  If symptoms worsen [phone] [id number] 
[phone] Lcuy Quintanilla MD Family Practice In 2 days  [facility] [id number] [phone] Medication List  
  
START taking these medications   
acetaminophen-codeine 300-30 mg per tablet Commonly known as:  TYLENOL-CODEINE #3 Take 1 Tab by mouth every four (4) hours as needed for Pain. Max Daily Amount: 6 Tabs. indomethacin 25 mg capsule Commonly known as:  INDOCIN Take 2 Caps by mouth three (3) times daily. With food STOP taking these medications   
colchicine 0.6 mg tablet HYDROcodone-acetaminophen 5-325 mg per tablet Commonly known as:  NORCO 
  
predniSONE 10 mg dose pack Commonly known as:  CHAMPAPRED MICHAEL 
  
  
ASK your doctor about these medications   
lisinopril 20 mg tablet Commonly known as:  Nestor Varela Take 1 Tab by mouth daily. Where to Get Your Medications Information about where to get these medications is not yet available Ask your nurse or doctor about these medications · acetaminophen-codeine 300-30 mg per tablet · indomethacin 25 mg capsule

## 2018-11-26 NOTE — ED NOTES
GALILEA received a referral from the  for this pt. Pt.  currently works but is uninsured and has no pcp. This pt. has been non-compliant to the service care plan that has been established by the [facility]. Information about medicaid expansion and a Care-A-Van schedule, a contact letter and [facility] contact information mailed today to address on file.

## 2019-03-04 ENCOUNTER — APPOINTMENT (OUTPATIENT)
Dept: GENERAL RADIOLOGY | Age: 39
End: 2019-03-04
Attending: EMERGENCY MEDICINE
Payer: COMMERCIAL

## 2019-03-04 ENCOUNTER — HOSPITAL ENCOUNTER (EMERGENCY)
Age: 39
Discharge: HOME | End: 2019-03-04
Attending: EMERGENCY MEDICINE | Admitting: EMERGENCY MEDICINE
Payer: COMMERCIAL

## 2019-03-04 VITALS
TEMPERATURE: 98.3 F | DIASTOLIC BLOOD PRESSURE: 79 MMHG | HEART RATE: 85 BPM | RESPIRATION RATE: 22 BRPM | OXYGEN SATURATION: 95 % | SYSTOLIC BLOOD PRESSURE: 124 MMHG

## 2019-03-04 DIAGNOSIS — M10.9 ACUTE GOUT OF LEFT FOOT, UNSPECIFIED CAUSE: Primary | ICD-10-CM

## 2019-03-04 PROCEDURE — 73630 X-RAY EXAM OF FOOT: CPT

## 2019-03-04 PROCEDURE — 73610 X-RAY EXAM OF ANKLE: CPT

## 2019-03-04 PROCEDURE — 74011250637 HC RX REV CODE- 250/637: Performed by: EMERGENCY MEDICINE

## 2019-03-04 PROCEDURE — 99283 EMERGENCY DEPT VISIT LOW MDM: CPT

## 2019-03-04 RX ORDER — OXYCODONE AND ACETAMINOPHEN 5; 325 MG/1; MG/1
2 TABLET ORAL
Status: COMPLETED | OUTPATIENT
Start: 2019-03-04 | End: 2019-03-04

## 2019-03-04 RX ORDER — OXYCODONE AND ACETAMINOPHEN 5; 325 MG/1; MG/1
1 TABLET ORAL
Qty: 6 TAB | Refills: 0 | Status: SHIPPED | OUTPATIENT
Start: 2019-03-04 | End: 2019-03-10

## 2019-03-04 RX ORDER — COLCHICINE 0.6 MG/1
0.6 CAPSULE ORAL
Status: COMPLETED | OUTPATIENT
Start: 2019-03-04 | End: 2019-03-04

## 2019-03-04 RX ORDER — COLCHICINE 0.6 MG/1
TABLET ORAL
Qty: 6 TAB | Refills: 0 | Status: SHIPPED | OUTPATIENT
Start: 2019-03-04 | End: 2019-08-09

## 2019-03-04 RX ADMIN — OXYCODONE AND ACETAMINOPHEN 2 TABLET: 5; 325 TABLET ORAL at 09:41

## 2019-03-04 RX ADMIN — COLCHICINE 0.6 MG: 0.6 CAPSULE ORAL at 09:43

## 2019-03-04 NOTE — ED PROVIDER NOTES
EMERGENCY DEPARTMENT HISTORY AND PHYSICAL EXAM 
 
9:36 AM 
 
 
Date: 3/4/2019 Patient Name: John Vicente History of Presenting Illness Chief Complaint Patient presents with  Foot Pain  
  left History Provided By: Patient Additional History (Context): John Vicente is a 34 y.o. male with hx of HTN and hyperlipidemia presents with L foot pain onset yesterday. Pt says that he remembers coming home Saturday night and rolling on his L foot. He thinks that he might have put \"too much weight\" on it. He then states that the pain became worse yesterday, but now suspects that it might be gout. Pt denies hx of diabetes or any injuries. No other concerns or symptoms at this time. PCP: Vira Felton MD 
 
Chief Complaint: L foot pain Duration:  Since yesterday Timing:  Acute, worsening Location: LLE Quality: aching Severity: 10/10 Modifying Factors: none reported Associated Symptoms: swelling Current Outpatient Medications Medication Sig Dispense Refill  colchicine 0.6 mg tablet Take 1 tablet by mouth Q1Hour for gout pain. NOT TO EXCEED 3 TABLETS IN 24 HOURS. 6 Tab 0  
 oxyCODONE-acetaminophen (PERCOCET) 5-325 mg per tablet Take 1 Tab by mouth every six (6) hours as needed for Pain for up to 6 days. Max Daily Amount: 4 Tabs. 6 Tab 0  
 indomethacin (INDOCIN) 25 mg capsule Take 2 Caps by mouth three (3) times daily. With food 60 Cap 0  
 acetaminophen-codeine (TYLENOL-CODEINE #3) 300-30 mg per tablet Take 1 Tab by mouth every four (4) hours as needed for Pain. Max Daily Amount: 6 Tabs. 12 Tab 0  
 lisinopril (PRINIVIL, ZESTRIL) 20 mg tablet Take 1 Tab by mouth daily. 30 Tab 1 Past History Past Medical History: 
Past Medical History:  
Diagnosis Date  Elevated uric acid in blood 03/05/2015  
 8.6 mg/dL  Hyperlipidemia  Hypertension  Medical non-compliance  Renal insufficiency 04/06/2011  BUN/sCr: 14/1.4, GFR >60  
  Right knee pain 09/20/2010 Plain Film NEG  
 Right knee pain 04/10/2015 Plain Film NEG Past Surgical History: 
Past Surgical History:  
Procedure Laterality Date  HX CYST REMOVAL    
 HX TONSILLECTOMY Family History: No family history on file. Social History: 
Social History Tobacco Use  Smoking status: Never Smoker  Smokeless tobacco: Never Used Substance Use Topics  Alcohol use: Yes Comment: socially  Drug use: No  
 
 
Allergies: 
No Known Allergies Review of Systems Review of Systems Constitutional: Negative for diaphoresis and fever. HENT: Negative for congestion and sore throat. Eyes: Negative for pain and itching. Respiratory: Negative for cough and shortness of breath. Cardiovascular: Negative for chest pain and palpitations. Gastrointestinal: Negative for abdominal pain and diarrhea. Endocrine: Negative for polydipsia and polyuria. Genitourinary: Negative for dysuria and hematuria. Musculoskeletal:  
     + L ankle pain and swelling Skin: Negative for rash and wound. Neurological: Negative for seizures and syncope. Hematological: Does not bruise/bleed easily. Psychiatric/Behavioral: Negative for agitation and hallucinations. All other systems reviewed and are negative. Physical Exam  
 
Patient Vitals for the past 12 hrs: 
 Temp Pulse Resp BP SpO2  
03/04/19 0929 98.3 °F (36.8 °C) 85 22 124/79 95 % Physical Exam  
Constitutional: He appears well-developed and well-nourished. HENT:  
Head: Normocephalic and atraumatic. Eyes: Conjunctivae are normal. No scleral icterus. Neck: Normal range of motion. Neck supple. No JVD present. Cardiovascular: Normal rate, regular rhythm, normal heart sounds and intact distal pulses. Pulmonary/Chest: Effort normal. No respiratory distress. Musculoskeletal: Normal range of motion.  He exhibits tenderness ( Tenderness on the lateral aspect of the L foot, no bony tendernes). Neurological: He is alert. Skin: Skin is warm and dry. There is erythema ( L foot ). No skin defect on the L foot Psychiatric: Judgment and thought content normal.  
Nursing note and vitals reviewed. Diagnostic Study Results Labs - No results found for this or any previous visit (from the past 12 hour(s)). Radiologic Studies -  
XR ANKLE LT MIN 3 V Final Result IMPRESSION:  
  
Left ankle: No acute osseous findings. Mild soft tissue swelling. See additional details  
above. Left foot:  
No acute osseous findings. Mild dorsal foot soft tissue swelling. XR FOOT LT MIN 3 V Final Result IMPRESSION:  
  
Left ankle: No acute osseous findings. Mild soft tissue swelling. See additional details  
above. Left foot:  
No acute osseous findings. Mild dorsal foot soft tissue swelling. Xr Ankle Lt Min 3 V Result Date: 3/4/2019 EXAMINATION: Left ankle 3 views Left foot 3 views INDICATION: Left foot pain, swelling, reported history of gout COMPARISON: None FINDINGS: Left ankle: 3 views obtained. No acute fracture or subluxation. Ankle mortise preserved and talar dome smooth. Tiny chronic appearing ossific fragment along the tip of the medial malleolus. Tiny Achilles enthesophyte. Mild soft tissue swelling about the ankle. Left foot: 3 views obtained. No acute fracture or subluxation identified. Perhaps minimal first MTP degenerative change. Joint spaces maintained. Mild dorsal foot soft tissue swelling. Small Achilles enthesophyte. IMPRESSION: Left ankle: No acute osseous findings. Mild soft tissue swelling. See additional details above. Left foot: No acute osseous findings. Mild dorsal foot soft tissue swelling. Xr Foot Lt Min 3 V Result Date: 3/4/2019 EXAMINATION: Left ankle 3 views Left foot 3 views INDICATION: Left foot pain, swelling, reported history of gout COMPARISON: None FINDINGS: Left ankle: 3 views obtained. No acute fracture or subluxation. Ankle mortise preserved and talar dome smooth. Tiny chronic appearing ossific fragment along the tip of the medial malleolus. Tiny Achilles enthesophyte. Mild soft tissue swelling about the ankle. Left foot: 3 views obtained. No acute fracture or subluxation identified. Perhaps minimal first MTP degenerative change. Joint spaces maintained. Mild dorsal foot soft tissue swelling. Small Achilles enthesophyte. IMPRESSION: Left ankle: No acute osseous findings. Mild soft tissue swelling. See additional details above. Left foot: No acute osseous findings. Mild dorsal foot soft tissue swelling. Medications ordered:  
Medications  
oxyCODONE-acetaminophen (PERCOCET) 5-325 mg per tablet 2 Tab (2 Tabs Oral Given 3/4/19 5360) colchicine (MITIGARE) capsule 0.6 mg (0.6 mg Oral Given 3/4/19 9038) Medical Decision Making Initial Medical Decision Making and DDx: 
Most likely gout. Do not suspect cellulitis, septic joint. XR to rule out fracture. ED Course: Progress Notes, Reevaluation, and Consults: 
  
10:31 AM  
XR negative for fracture. Consistent with got. Continue Colchicine and Percocet. 10:32 AM 
Reviewed . No alarming patterns. I am the first provider for this patient. I reviewed the vital signs, available nursing notes, past medical history, past surgical history, family history and social history. Vital Signs-Reviewed the patient's vital signs. Pulse Oximetry Analysis - 95% on RA Records Reviewed: Nursing Notes (Time of Review: 9:36 AM) Diagnosis Clinical Impression: 1. Acute gout of left foot, unspecified cause Disposition: Discharge Follow-up Information Follow up With Specialties Details Why Contact Info Alina Ordoñez MD Family Practice In 2 days  [facility] [id number] [phone] Medication List  
  
START taking these medications   
colchicine 0.6 mg tablet Take 1 tablet by mouth Q1Hour for gout pain. NOT TO EXCEED 3 TABLETS IN 24 HOURS. oxyCODONE-acetaminophen 5-325 mg per tablet Commonly known as:  PERCOCET Take 1 Tab by mouth every six (6) hours as needed for Pain for up to 6 days. Max Daily Amount: 4 Tabs. ASK your doctor about these medications   
acetaminophen-codeine 300-30 mg per tablet Commonly known as:  TYLENOL-CODEINE #3 Take 1 Tab by mouth every four (4) hours as needed for Pain. Max Daily Amount: 6 Tabs. indomethacin 25 mg capsule Commonly known as:  INDOCIN Take 2 Caps by mouth three (3) times daily. With food 
  
lisinopril 20 mg tablet Commonly known as:  Karn Desanctis Take 1 Tab by mouth daily. Where to Get Your Medications Information about where to get these medications is not yet available Ask your nurse or doctor about these medications · colchicine 0.6 mg tablet · oxyCODONE-acetaminophen 5-325 mg per tablet 
  
 
_______________________________ Attestations: 
Scribe Attestation [address] [place] [address] acting as a scribe for and in the presence of Miguel Angel Jean MD     
March 04, 2019 at 9:36 AM 
    
Provider Attestation:     
I personally performed the services described in the documentation, reviewed the documentation, as recorded by the scribe in my presence, and it accurately and completely records my words and actions. March 04, 2019 at 9:36 AM - Miguel Angel Jean MD   
_______________________________

## 2019-03-04 NOTE — LETTER
[address] [place] [address] [facility] EMERGENCY DEPT 
[address] [facility] [id number] [phone] Work/School Note Date: 3/4/2019 To Whom It May concern: Magdalene King was seen and treated today in the emergency room by the following provider(s): 
Attending Provider: Altagracia Reyes MD. Magdalene King may return to work on 3/5/19.  
 
Sincerely, 
 
 
 
 
Joyce Gilbert RN

## 2019-03-04 NOTE — ED TRIAGE NOTES
Pt c/o left foot pain since yesterday. Unable to bear weight and foot is swollen. Denies injuy. HX: gout

## 2019-03-04 NOTE — DISCHARGE INSTRUCTIONS
Patient Education        Gout: Care Instructions  Your Care Instructions    Gout is a form of arthritis caused by a buildup of uric acid crystals in a joint. It causes sudden attacks of pain, swelling, redness, and stiffness, usually in one joint, especially the big toe. Gout usually comes on without a cause. But it can be brought on by drinking alcohol (especially beer) or eating seafood and red meat. Taking certain medicines, such as diuretics or aspirin, also can bring on an attack of gout. Taking your medicines as prescribed and following up with your doctor regularly can help you avoid gout attacks in the future. Follow-up care is a key part of your treatment and safety. Be sure to make and go to all appointments, and call your doctor if you are having problems. It's also a good idea to know your test results and keep a list of the medicines you take. How can you care for yourself at home? · If the joint is swollen, put ice or a cold pack on the area for 10 to 20 minutes at a time. Put a thin cloth between the ice and your skin. · Prop up the sore limb on a pillow when you ice it or anytime you sit or lie down during the next 3 days. Try to keep it above the level of your heart. This will help reduce swelling. · Rest sore joints. Avoid activities that put weight or strain on the joints for a few days. Take short rest breaks from your regular activities during the day. · Take your medicines exactly as prescribed. Call your doctor if you think you are having a problem with your medicine. · Take pain medicines exactly as directed. ? If the doctor gave you a prescription medicine for pain, take it as prescribed. ? If you are not taking a prescription pain medicine, ask your doctor if you can take an over-the-counter medicine. · Eat less seafood and red meat. · Check with your doctor before drinking alcohol. · Losing weight, if you are overweight, may help reduce attacks of gout.  But do not go on a \"crash diet. \" Losing a lot of weight in a short amount of time can cause a gout attack. When should you call for help? Call your doctor now or seek immediate medical care if:    · You have a fever.     · The joint is so painful you cannot use it.     · You have sudden, unexplained swelling, redness, warmth, or severe pain in one or more joints.    Watch closely for changes in your health, and be sure to contact your doctor if:    · You have joint pain.     · Your symptoms get worse or are not improving after 2 or 3 days. Where can you learn more? Go to http://elli-vani.info/. Enter O159 in the search box to learn more about \"Gout: Care Instructions. \"  Current as of: Megha 10, 2018  Content Version: 11.9  © 2123-5131 [employer]. Care instructions adapted under license by [employer] (which disclaims liability or warranty for this information). If you have questions about a medical condition or this instruction, always ask your healthcare professional. [address] any warranty or liability for your use of this information.

## 2019-03-04 NOTE — ED NOTES
Discharge instructions, work note, and prescriptions given to pt - verbalized understanding. Pt ambulated independently out of ED.

## 2019-04-09 ENCOUNTER — OFFICE VISIT (OUTPATIENT)
Dept: FAMILY MEDICINE CLINIC | Facility: CLINIC | Age: 39
End: 2019-04-09

## 2019-04-09 VITALS
DIASTOLIC BLOOD PRESSURE: 86 MMHG | WEIGHT: 315 LBS | HEART RATE: 106 BPM | SYSTOLIC BLOOD PRESSURE: 140 MMHG | HEIGHT: 72 IN | BODY MASS INDEX: 42.66 KG/M2 | TEMPERATURE: 98.1 F | RESPIRATION RATE: 18 BRPM | OXYGEN SATURATION: 97 %

## 2019-04-09 DIAGNOSIS — Z13.29 SCREENING FOR THYROID DISORDER: ICD-10-CM

## 2019-04-09 DIAGNOSIS — E78.2 MIXED HYPERLIPIDEMIA: ICD-10-CM

## 2019-04-09 DIAGNOSIS — I10 HYPERTENSION, UNSPECIFIED TYPE: Primary | ICD-10-CM

## 2019-04-09 DIAGNOSIS — Z87.39 HISTORY OF GOUT: ICD-10-CM

## 2019-04-09 DIAGNOSIS — G47.30 SLEEP APNEA, UNSPECIFIED TYPE: ICD-10-CM

## 2019-04-09 DIAGNOSIS — Z13.1 SCREENING FOR DIABETES MELLITUS: ICD-10-CM

## 2019-04-09 PROBLEM — E66.01 OBESITY, MORBID (HCC): Status: ACTIVE | Noted: 2019-04-09

## 2019-04-09 RX ORDER — ALLOPURINOL 100 MG/1
100 TABLET ORAL DAILY
Qty: 30 TAB | Refills: 1 | Status: SHIPPED | OUTPATIENT
Start: 2019-04-09 | End: 2019-07-11 | Stop reason: SDUPTHER

## 2019-04-09 RX ORDER — LISINOPRIL 20 MG/1
20 TABLET ORAL DAILY
Qty: 30 TAB | Refills: 1 | Status: SHIPPED | OUTPATIENT
Start: 2019-04-09 | End: 2019-06-07 | Stop reason: SDUPTHER

## 2019-04-09 NOTE — PROGRESS NOTES
Osei Welch is a 34 y.o. male presenting today for New Patient  . HPI:  Osei Welch presents to the office today to establish care with the practice. Patient has a PMH for hypertension, hyperlipidemia, sleep apnea and gout. Patient is requesting a referral to sleep study. He reports he previously saw Dr. Astrid Sutton and what like a referral to his practice. He also complaints of gout in bilateral great toe and right knee. He takes Lisinopril 20 mg daily and his B/p is 140/86. He reports he has not taken his medication this morning. Patient also has gout. Per the medical record, he has has six gout attacks over the last year. He is negative for chest pain, palpation, cough or wheezing. Patient notes he get dyspnea secondary to his weight when he moves a lot. He denies any abdominal pain, nausea, vomiting, diarrhea or constipation. Review of Systems   Respiratory: Positive for shortness of breath. Negative for cough and wheezing. Cardiovascular: Negative for chest pain, palpitations and leg swelling. Gastrointestinal: Negative for abdominal pain. Musculoskeletal: Positive for joint pain (history of gout  , right knee pain). Neurological: Negative for dizziness and headaches. No Known Allergies    Current Outpatient Medications   Medication Sig Dispense Refill    allopurinol (ZYLOPRIM) 100 mg tablet Take 1 Tab by mouth daily. 30 Tab 1    lisinopril (PRINIVIL, ZESTRIL) 20 mg tablet Take 1 Tab by mouth daily. 30 Tab 1    colchicine 0.6 mg tablet Take 1 tablet by mouth Q1Hour for gout pain.     NOT TO EXCEED 3 TABLETS IN 24 HOURS. 6 Tab 0       Past Medical History:   Diagnosis Date    Elevated uric acid in blood 03/05/2015    8.6 mg/dL    Hyperlipidemia     Hypertension     Medical non-compliance     Renal insufficiency 04/06/2011    BUN/sCr: 14/1.4, GFR >60    Right knee pain 09/20/2010    Plain Film NEG    Right knee pain 04/10/2015    Plain Film NEG       Past Surgical History:   Procedure Laterality Date    HX CYST REMOVAL      HX TONSILLECTOMY         Social History     Socioeconomic History    Marital status: UNKNOWN     Spouse name: Not on file    Number of children: Not on file    Years of education: Not on file    Highest education level: Not on file   Occupational History    Not on file   Social Needs    Financial resource strain: Not on file    Food insecurity:     Worry: Not on file     Inability: Not on file    Transportation needs:     Medical: Not on file     Non-medical: Not on file   Tobacco Use    Smoking status: Never Smoker    Smokeless tobacco: Never Used   Substance and Sexual Activity    Alcohol use: Yes     Comment: socially    Drug use: No    Sexual activity: Yes     Birth control/protection: Condom   Lifestyle    Physical activity:     Days per week: Not on file     Minutes per session: Not on file    Stress: Not on file   Relationships    Social connections:     Talks on phone: Not on file     Gets together: Not on file     Attends Shinto service: Not on file     Active member of club or organization: Not on file     Attends meetings of clubs or organizations: Not on file     Relationship status: Not on file    Intimate partner violence:     Fear of current or ex partner: Not on file     Emotionally abused: Not on file     Physically abused: Not on file     Forced sexual activity: Not on file   Other Topics Concern    Not on file   Social History Narrative    Not on file       Patient does not have an advanced directive on file    Vitals:    04/09/19 0922   BP: 140/86   Pulse: (!) 106   Resp: 18   Temp: 98.1 °F (36.7 °C)   TempSrc: Tympanic   SpO2: 97%   Weight: 317 lb (143.8 kg)   Height: 6' (1.829 m)   PainSc:   0 - No pain       Physical Exam   Constitutional: No distress. Cardiovascular: Normal rate, regular rhythm and normal heart sounds.    Pulmonary/Chest: Effort normal and breath sounds normal.   Musculoskeletal: He exhibits no edema. Neurological: He is alert. Nursing note and vitals reviewed. No visits with results within 3 Month(s) from this visit. Latest known visit with results is:   Admission on 02/19/2018, Discharged on 02/19/2018   Component Date Value Ref Range Status    CO2, POC 02/19/2018 28* 19 - 24 MMOL/L Final    Glucose, POC 02/19/2018 101  74 - 106 MG/DL Final    BUN, POC 02/19/2018 10  7 - 18 MG/DL Final    Creatinine, POC 02/19/2018 1.2  0.6 - 1.3 MG/DL Final    GFRAA, POC 02/19/2018 >60  >60 ml/min/1.73m2 Final    GFRNA, POC 02/19/2018 >60  >60 ml/min/1.73m2 Final    Comment: Estimated GFR is calculated using the IDMS-traceable Modification of Diet in Renal Disease (MDRD) Study equation, reported for both  Americans (GFRAA) and non- Americans (GFRNA), and normalized to 1.73m2 body surface area. The physician must decide which value applies to the patient. The MDRD study equation should only be used in individuals age 25 or older. It has not been validated for the following: pregnant women, patients with serious comorbid conditions, or on certain medications, or persons with extremes of body size, muscle mass, or nutritional status.  Sodium, POC 02/19/2018 142  136 - 145 MMOL/L Final    Potassium, POC 02/19/2018 3.7  3.5 - 5.5 MMOL/L Final    Calcium, ionized (POC) 02/19/2018 1.21  1.12 - 1.32 MMOL/L Final    Chloride, POC 02/19/2018 102  100 - 108 MMOL/L Final    Anion gap, POC 02/19/2018 16  10 - 20   Final    Hematocrit, POC 02/19/2018 45  36 - 49 % Final    Hemoglobin, POC 02/19/2018 15.3  12 - 16 G/DL Final       .No results found for any visits on 04/09/19. Assessment / Plan:      ICD-10-CM ICD-9-CM    1. Hypertension, unspecified type I10 401.9 lisinopril (PRINIVIL, ZESTRIL) 20 mg tablet      CBC WITH AUTOMATED DIFF      LIPID PANEL      METABOLIC PANEL, COMPREHENSIVE   2. History of gout Z87.39 V12.29 allopurinol (ZYLOPRIM) 100 mg tablet   3.  Sleep apnea, unspecified type G47.30 780.57 SLEEP MEDICINE REFERRAL   4. Mixed hyperlipidemia E78.2 272.2 LIPID PANEL   5. Screening for diabetes mellitus Z13.1 V77.1 HEMOGLOBIN A1C WITH EAG   6. Screening for thyroid disorder Z13.29 V77.0 TSH 3RD GENERATION     Hypertension-blood pressure above target goal today. Patient notes he did not take his medication this morning. Refill lisinopril prescription and instructed to follow-up in 2 months for reevaluation  Patient has a history of gout-patient has been to the emergency room at least 6 times over the last year for gout. Started patient on allopurinol 100 mg tablet today  Hyperlipidemia-lipid panel ordered for next lab draw  Screening patient for diabetes and thyroid disease  Follow-up in 2 months        Follow-up and Dispositions    · Return in about 2 months (around 6/9/2019). I asked the patient if he  had any questions and answered his  questions. The patient stated that he understands the treatment plan and agrees with the treatment plan    Discussed the patient's BMI with him. The BMI follow up plan is as follows:     dietary management education, guidance, and counseling  encourage exercise  monitor weight  prescribed dietary intake    An After Visit Summary was printed and given to the patient.

## 2019-04-09 NOTE — PATIENT INSTRUCTIONS
Body Mass Index: Care Instructions  Your Care Instructions    Body mass index (BMI) can help you see if your weight is raising your risk for health problems. It uses a formula to compare how much you weigh with how tall you are. · A BMI lower than 18.5 is considered underweight. · A BMI between 18.5 and 24.9 is considered healthy. · A BMI between 25 and 29.9 is considered overweight. A BMI of 30 or higher is considered obese. If your BMI is in the normal range, it means that you have a lower risk for weight-related health problems. If your BMI is in the overweight or obese range, you may be at increased risk for weight-related health problems, such as high blood pressure, heart disease, stroke, arthritis or joint pain, and diabetes. If your BMI is in the underweight range, you may be at increased risk for health problems such as fatigue, lower protection (immunity) against illness, muscle loss, bone loss, hair loss, and hormone problems. BMI is just one measure of your risk for weight-related health problems. You may be at higher risk for health problems if you are not active, you eat an unhealthy diet, or you drink too much alcohol or use tobacco products. Follow-up care is a key part of your treatment and safety. Be sure to make and go to all appointments, and call your doctor if you are having problems. It's also a good idea to know your test results and keep a list of the medicines you take. How can you care for yourself at home? · Practice healthy eating habits. This includes eating plenty of fruits, vegetables, whole grains, lean protein, and low-fat dairy. · If your doctor recommends it, get more exercise. Walking is a good choice. Bit by bit, increase the amount you walk every day. Try for at least 30 minutes on most days of the week. · Do not smoke. Smoking can increase your risk for health problems. If you need help quitting, talk to your doctor about stop-smoking programs and medicines. These can increase your chances of quitting for good. · Limit alcohol to 2 drinks a day for men and 1 drink a day for women. Too much alcohol can cause health problems. If you have a BMI higher than 25  · Your doctor may do other tests to check your risk for weight-related health problems. This may include measuring the distance around your waist. A waist measurement of more than 40 inches in men or 35 inches in women can increase the risk of weight-related health problems. · Talk with your doctor about steps you can take to stay healthy or improve your health. You may need to make lifestyle changes to lose weight and stay healthy, such as changing your diet and getting regular exercise. If you have a BMI lower than 18.5  · Your doctor may do other tests to check your risk for health problems. · Talk with your doctor about steps you can take to stay healthy or improve your health. You may need to make lifestyle changes to gain or maintain weight and stay healthy, such as getting more healthy foods in your diet and doing exercises to build muscle. Where can you learn more? Go to http://elli-vani.info/. Enter S176 in the search box to learn more about \"Body Mass Index: Care Instructions. \"  Current as of: October 13, 2016  Content Version: 11.4  © 9793-2660 Healthwise, [employer]. Care instructions adapted under license by [employer] (which disclaims liability or warranty for this information). If you have questions about a medical condition or this instruction, always ask your healthcare professional. [address] any warranty or liability for your use of this information.

## 2019-05-16 ENCOUNTER — OFFICE VISIT (OUTPATIENT)
Dept: FAMILY MEDICINE CLINIC | Facility: CLINIC | Age: 39
End: 2019-05-16

## 2019-05-16 ENCOUNTER — HOSPITAL ENCOUNTER (OUTPATIENT)
Dept: LAB | Age: 39
Discharge: HOME | End: 2019-05-16
Payer: MEDICAID

## 2019-05-16 VITALS
BODY MASS INDEX: 40.8 KG/M2 | OXYGEN SATURATION: 95 % | HEIGHT: 72 IN | SYSTOLIC BLOOD PRESSURE: 122 MMHG | WEIGHT: 301.2 LBS | DIASTOLIC BLOOD PRESSURE: 88 MMHG | HEART RATE: 87 BPM | TEMPERATURE: 97.7 F | RESPIRATION RATE: 18 BRPM

## 2019-05-16 DIAGNOSIS — M10.9 ACUTE GOUT OF RIGHT KNEE, UNSPECIFIED CAUSE: Primary | ICD-10-CM

## 2019-05-16 DIAGNOSIS — M10.9 ACUTE GOUT OF RIGHT KNEE, UNSPECIFIED CAUSE: ICD-10-CM

## 2019-05-16 DIAGNOSIS — E66.01 OBESITY, MORBID (HCC): ICD-10-CM

## 2019-05-16 DIAGNOSIS — M25.461 SWELLING OF RIGHT KNEE JOINT: ICD-10-CM

## 2019-05-16 LAB — URATE SERPL-MCNC: 8.1 MG/DL (ref 2.6–7.2)

## 2019-05-16 PROCEDURE — 36415 COLL VENOUS BLD VENIPUNCTURE: CPT

## 2019-05-16 PROCEDURE — 84550 ASSAY OF BLOOD/URIC ACID: CPT

## 2019-05-16 RX ORDER — INDOMETHACIN 25 MG/1
CAPSULE ORAL
Qty: 28 CAP | Refills: 0 | Status: SHIPPED | OUTPATIENT
Start: 2019-05-16 | End: 2019-07-11 | Stop reason: SDUPTHER

## 2019-05-16 NOTE — PROGRESS NOTES
Deya Quezada is a 34 y.o. male presenting today for Gout  . HPI:  Deya Quezada presents to the office today for right knee pain. Patient notes he has right knee pain x2 days ago and he notes the pain is a 10/10. Patient does have a history of gout and currently taking allopurinol daily. Patient does admit he drinks about (2) 40 ounce bottles of beer every day. Patient has a recurrent history of gout and was previously treated in the emergency room on March, 2019. Patient notes he has been trying to take over-the-counter medication for his pain but is been consistent. Review of Systems   Respiratory: Negative for cough. Cardiovascular: Negative for chest pain and palpitations. Musculoskeletal: Positive for joint pain ( Right knee pain). No Known Allergies    Current Outpatient Medications   Medication Sig Dispense Refill    indomethacin (INDOCIN) 25 mg capsule Take 2 capsules by mouth, three times a day x 3 days, then take 1 capsule by mouth daily x 10 days 28 Cap 0    allopurinol (ZYLOPRIM) 100 mg tablet Take 1 Tab by mouth daily. 30 Tab 1    lisinopril (PRINIVIL, ZESTRIL) 20 mg tablet Take 1 Tab by mouth daily. 30 Tab 1    colchicine 0.6 mg tablet Take 1 tablet by mouth Q1Hour for gout pain.     NOT TO EXCEED 3 TABLETS IN 24 HOURS. 6 Tab 0       Past Medical History:   Diagnosis Date    Elevated uric acid in blood 03/05/2015    8.6 mg/dL    Hyperlipidemia     Hypertension     Medical non-compliance     Renal insufficiency 04/06/2011    BUN/sCr: 14/1.4, GFR >60    Right knee pain 09/20/2010    Plain Film NEG    Right knee pain 04/10/2015    Plain Film NEG       Past Surgical History:   Procedure Laterality Date    HX CYST REMOVAL      HX TONSILLECTOMY         Social History     Socioeconomic History    Marital status: UNKNOWN     Spouse name: Not on file    Number of children: Not on file    Years of education: Not on file    Highest education level: Not on file Occupational History    Not on file   Social Needs    Financial resource strain: Not on file    Food insecurity:     Worry: Not on file     Inability: Not on file    Transportation needs:     Medical: Not on file     Non-medical: Not on file   Tobacco Use    Smoking status: Never Smoker    Smokeless tobacco: Never Used   Substance and Sexual Activity    Alcohol use: Yes     Comment: socially    Drug use: No    Sexual activity: Yes     Partners: Female     Birth control/protection: Condom   Lifestyle    Physical activity:     Days per week: Not on file     Minutes per session: Not on file    Stress: Not on file   Relationships    Social connections:     Talks on phone: Not on file     Gets together: Not on file     Attends Zoroastrian service: Not on file     Active member of club or organization: Not on file     Attends meetings of clubs or organizations: Not on file     Relationship status: Not on file    Intimate partner violence:     Fear of current or ex partner: Not on file     Emotionally abused: Not on file     Physically abused: Not on file     Forced sexual activity: Not on file   Other Topics Concern    Not on file   Social History Narrative    Not on file       Patient does not have an advanced directive on file    Vitals:    05/16/19 1144   BP: 122/88   Pulse: 87   Resp: 18   Temp: 97.7 °F (36.5 °C)   TempSrc: Tympanic   SpO2: 95%   Weight: 301 lb 3.2 oz (136.6 kg)   Height: 6' (1.829 m)   PainSc:  10 - Worst pain ever   PainLoc: Knee       Physical Exam   Constitutional: No distress. Cardiovascular: Normal rate, regular rhythm and normal heart sounds. Pulmonary/Chest: Effort normal and breath sounds normal.   Neurological: He is alert. Nursing note and vitals reviewed. No visits with results within 3 Month(s) from this visit.    Latest known visit with results is:   Admission on 02/19/2018, Discharged on 02/19/2018   Component Date Value Ref Range Status    CO2, POC 02/19/2018 28* 19 - 24 MMOL/L Final    Glucose, POC 02/19/2018 101  74 - 106 MG/DL Final    BUN, POC 02/19/2018 10  7 - 18 MG/DL Final    Creatinine, POC 02/19/2018 1.2  0.6 - 1.3 MG/DL Final    GFRAA, POC 02/19/2018 >60  >60 ml/min/1.73m2 Final    GFRNA, POC 02/19/2018 >60  >60 ml/min/1.73m2 Final    Comment: Estimated GFR is calculated using the IDMS-traceable Modification of Diet in Renal Disease (MDRD) Study equation, reported for both  Americans (GFRAA) and non- Americans (GFRNA), and normalized to 1.73m2 body surface area. The physician must decide which value applies to the patient. The MDRD study equation should only be used in individuals age 25 or older. It has not been validated for the following: pregnant women, patients with serious comorbid conditions, or on certain medications, or persons with extremes of body size, muscle mass, or nutritional status.  Sodium, [facility] 02/19/2018 142  136 - 145 MMOL/L Final    Potassium, POC 02/19/2018 3.7  3.5 - 5.5 MMOL/L Final    Calcium, ionized ([facility]) 02/19/2018 1.21  1.12 - 1.32 MMOL/L Final    Chloride, POC 02/19/2018 102  100 - 108 MMOL/L Final    Anion gap, POC 02/19/2018 16  10 - 20   Final    Hematocrit, POC 02/19/2018 45  36 - 49 % Final    Hemoglobin, [facility] 02/19/2018 15.3  12 - 16 G/DL Final       .No results found for any visits on 05/16/19. Assessment / Plan:      ICD-10-CM ICD-9-CM    1. Acute gout of right knee, unspecified cause M10.9 274.01 indomethacin (INDOCIN) 25 mg capsule      URIC ACID   2. Swelling of right knee joint M25.461 719.06    3. Obesity, morbid ([address] E66.01 278.01      Indomethacin 25 mg capsules given  Avoid beer purées  Increase her fluid intake  Uric acid ordered  Patient has a scheduled follow-up appointment for June 6, 2019      Follow-up and Dispositions    · Return if symptoms worsen or fail to improve. I asked the patient if he  had any questions and answered his  questions.   The patient stated that he understands the treatment plan and agrees with the treatment plan    This document was created with a voice activated dictation system and may contain transcription errors. Discussed the patient's BMI with him. The BMI follow up plan is as follows:     dietary management education, guidance, and counseling  encourage exercise  monitor weight  prescribed dietary intake    An After Visit Summary was printed and given to the patient.

## 2019-05-16 NOTE — PATIENT INSTRUCTIONS
Body Mass Index: Care Instructions  Your Care Instructions    Body mass index (BMI) can help you see if your weight is raising your risk for health problems. It uses a formula to compare how much you weigh with how tall you are. · A BMI lower than 18.5 is considered underweight. · A BMI between 18.5 and 24.9 is considered healthy. · A BMI between 25 and 29.9 is considered overweight. A BMI of 30 or higher is considered obese. If your BMI is in the normal range, it means that you have a lower risk for weight-related health problems. If your BMI is in the overweight or obese range, you may be at increased risk for weight-related health problems, such as high blood pressure, heart disease, stroke, arthritis or joint pain, and diabetes. If your BMI is in the underweight range, you may be at increased risk for health problems such as fatigue, lower protection (immunity) against illness, muscle loss, bone loss, hair loss, and hormone problems. BMI is just one measure of your risk for weight-related health problems. You may be at higher risk for health problems if you are not active, you eat an unhealthy diet, or you drink too much alcohol or use tobacco products. Follow-up care is a key part of your treatment and safety. Be sure to make and go to all appointments, and call your doctor if you are having problems. It's also a good idea to know your test results and keep a list of the medicines you take. How can you care for yourself at home? · Practice healthy eating habits. This includes eating plenty of fruits, vegetables, whole grains, lean protein, and low-fat dairy. · If your doctor recommends it, get more exercise. Walking is a good choice. Bit by bit, increase the amount you walk every day. Try for at least 30 minutes on most days of the week. · Do not smoke. Smoking can increase your risk for health problems. If you need help quitting, talk to your doctor about stop-smoking programs and medicines. These can increase your chances of quitting for good. · Limit alcohol to 2 drinks a day for men and 1 drink a day for women. Too much alcohol can cause health problems. If you have a BMI higher than 25  · Your doctor may do other tests to check your risk for weight-related health problems. This may include measuring the distance around your waist. A waist measurement of more than 40 inches in men or 35 inches in women can increase the risk of weight-related health problems. · Talk with your doctor about steps you can take to stay healthy or improve your health. You may need to make lifestyle changes to lose weight and stay healthy, such as changing your diet and getting regular exercise. If you have a BMI lower than 18.5  · Your doctor may do other tests to check your risk for health problems. · Talk with your doctor about steps you can take to stay healthy or improve your health. You may need to make lifestyle changes to gain or maintain weight and stay healthy, such as getting more healthy foods in your diet and doing exercises to build muscle. Where can you learn more? Go to http://elli-vani.info/. Enter S176 in the search box to learn more about \"Body Mass Index: Care Instructions. \"  Current as of: October 13, 2016  Content Version: 11.4  © 8367-9330 Healthwise, Inc[employer]. Care instructions adapted under license by [employer] (which disclaims liability or warranty for this information). If you have questions about a medical condition or this instruction, always ask your healthcare professional. [address] any warranty or liability for your use of this information.

## 2019-05-24 NOTE — PROGRESS NOTES
Please notify patient that lab results were reviewed and the results showed elevated uric acid which is consistent with gout

## 2019-05-29 ENCOUNTER — HOSPITAL ENCOUNTER (OUTPATIENT)
Dept: LAB | Age: 39
Discharge: HOME | End: 2019-05-29
Payer: MEDICAID

## 2019-05-29 DIAGNOSIS — Z13.1 SCREENING FOR DIABETES MELLITUS: ICD-10-CM

## 2019-05-29 DIAGNOSIS — Z13.29 SCREENING FOR THYROID DISORDER: ICD-10-CM

## 2019-05-29 DIAGNOSIS — I10 HYPERTENSION, UNSPECIFIED TYPE: ICD-10-CM

## 2019-05-29 DIAGNOSIS — E78.2 MIXED HYPERLIPIDEMIA: ICD-10-CM

## 2019-05-29 LAB
ALBUMIN SERPL-MCNC: 3.9 G/DL (ref 3.4–5)
ALBUMIN/GLOB SERPL: 0.9 {RATIO} (ref 0.8–1.7)
ALP SERPL-CCNC: 84 U/L (ref 45–117)
ALT SERPL-CCNC: 39 U/L (ref 16–61)
ANION GAP SERPL CALC-SCNC: 9 MMOL/L (ref 3–18)
AST SERPL-CCNC: 37 U/L (ref 15–37)
BASOPHILS # BLD: 0 K/UL (ref 0–0.1)
BASOPHILS NFR BLD: 0 % (ref 0–2)
BILIRUB SERPL-MCNC: 0.5 MG/DL (ref 0.2–1)
BUN SERPL-MCNC: 11 MG/DL (ref 7–18)
BUN/CREAT SERPL: 9 (ref 12–20)
CALCIUM SERPL-MCNC: 8.8 MG/DL (ref 8.5–10.1)
CHLORIDE SERPL-SCNC: 107 MMOL/L (ref 100–108)
CHOLEST SERPL-MCNC: 165 MG/DL
CO2 SERPL-SCNC: 24 MMOL/L (ref 21–32)
CREAT SERPL-MCNC: 1.26 MG/DL (ref 0.6–1.3)
DIFFERENTIAL METHOD BLD: ABNORMAL
EOSINOPHIL # BLD: 0.3 K/UL (ref 0–0.4)
EOSINOPHIL NFR BLD: 7 % (ref 0–5)
ERYTHROCYTE [DISTWIDTH] IN BLOOD BY AUTOMATED COUNT: 13.7 % (ref 11.6–14.5)
EST. AVERAGE GLUCOSE BLD GHB EST-MCNC: 111 MG/DL
GLOBULIN SER CALC-MCNC: 4.5 G/DL (ref 2–4)
GLUCOSE SERPL-MCNC: 79 MG/DL (ref 74–99)
HBA1C MFR BLD: 5.5 % (ref 4.2–5.6)
HCT VFR BLD AUTO: 40 % (ref 36–48)
HDLC SERPL-MCNC: 37 MG/DL (ref 40–60)
HDLC SERPL: 4.5 {RATIO} (ref 0–5)
HGB BLD-MCNC: 13.1 G/DL (ref 13–16)
LDLC SERPL CALC-MCNC: 89.4 MG/DL (ref 0–100)
LIPID PROFILE,FLP: ABNORMAL
LYMPHOCYTES # BLD: 1.9 K/UL (ref 0.9–3.6)
LYMPHOCYTES NFR BLD: 40 % (ref 21–52)
MCH RBC QN AUTO: 27.7 PG (ref 24–34)
MCHC RBC AUTO-ENTMCNC: 32.8 G/DL (ref 31–37)
MCV RBC AUTO: 84.6 FL (ref 74–97)
MONOCYTES # BLD: 0.7 K/UL (ref 0.05–1.2)
MONOCYTES NFR BLD: 14 % (ref 3–10)
NEUTS SEG # BLD: 1.9 K/UL (ref 1.8–8)
NEUTS SEG NFR BLD: 39 % (ref 40–73)
PLATELET # BLD AUTO: 321 K/UL (ref 135–420)
PMV BLD AUTO: 9.9 FL (ref 9.2–11.8)
POTASSIUM SERPL-SCNC: 3.9 MMOL/L (ref 3.5–5.5)
PROT SERPL-MCNC: 8.4 G/DL (ref 6.4–8.2)
RBC # BLD AUTO: 4.73 M/UL (ref 4.7–5.5)
SODIUM SERPL-SCNC: 140 MMOL/L (ref 136–145)
TRIGL SERPL-MCNC: 193 MG/DL (ref ?–150)
TSH SERPL DL<=0.05 MIU/L-ACNC: 2.02 UIU/ML (ref 0.36–3.74)
VLDLC SERPL CALC-MCNC: 38.6 MG/DL
WBC # BLD AUTO: 4.8 K/UL (ref 4.6–13.2)

## 2019-05-29 PROCEDURE — 84443 ASSAY THYROID STIM HORMONE: CPT

## 2019-05-29 PROCEDURE — 80053 COMPREHEN METABOLIC PANEL: CPT

## 2019-05-29 PROCEDURE — 36415 COLL VENOUS BLD VENIPUNCTURE: CPT

## 2019-05-29 PROCEDURE — 83036 HEMOGLOBIN GLYCOSYLATED A1C: CPT

## 2019-05-29 PROCEDURE — 85025 COMPLETE CBC W/AUTO DIFF WBC: CPT

## 2019-05-29 PROCEDURE — 80061 LIPID PANEL: CPT

## 2019-06-07 ENCOUNTER — HOSPITAL ENCOUNTER (OUTPATIENT)
Dept: GENERAL RADIOLOGY | Age: 39
Discharge: HOME | End: 2019-06-07
Payer: COMMERCIAL

## 2019-06-07 ENCOUNTER — OFFICE VISIT (OUTPATIENT)
Dept: FAMILY MEDICINE CLINIC | Facility: CLINIC | Age: 39
End: 2019-06-07

## 2019-06-07 VITALS
RESPIRATION RATE: 20 BRPM | HEART RATE: 75 BPM | BODY MASS INDEX: 40.09 KG/M2 | OXYGEN SATURATION: 98 % | TEMPERATURE: 97.7 F | SYSTOLIC BLOOD PRESSURE: 145 MMHG | DIASTOLIC BLOOD PRESSURE: 80 MMHG | WEIGHT: 296 LBS | HEIGHT: 72 IN

## 2019-06-07 DIAGNOSIS — N52.9 ERECTILE DYSFUNCTION, UNSPECIFIED ERECTILE DYSFUNCTION TYPE: ICD-10-CM

## 2019-06-07 DIAGNOSIS — Z87.39 HISTORY OF GOUT: ICD-10-CM

## 2019-06-07 DIAGNOSIS — M10.9 ACUTE GOUT OF LEFT FOOT, UNSPECIFIED CAUSE: ICD-10-CM

## 2019-06-07 DIAGNOSIS — I10 HYPERTENSION, UNSPECIFIED TYPE: Primary | ICD-10-CM

## 2019-06-07 DIAGNOSIS — G47.30 SLEEP APNEA, UNSPECIFIED TYPE: ICD-10-CM

## 2019-06-07 DIAGNOSIS — M25.561 ACUTE PAIN OF RIGHT KNEE: ICD-10-CM

## 2019-06-07 PROCEDURE — 73564 X-RAY EXAM KNEE 4 OR MORE: CPT

## 2019-06-07 RX ORDER — ALLOPURINOL 100 MG/1
100 TABLET ORAL DAILY
Qty: 30 TAB | Refills: 1 | Status: CANCELLED | OUTPATIENT
Start: 2019-06-07

## 2019-06-07 RX ORDER — COLCHICINE 0.6 MG/1
TABLET ORAL
Qty: 6 TAB | Refills: 0 | Status: CANCELLED | OUTPATIENT
Start: 2019-06-07

## 2019-06-07 RX ORDER — LISINOPRIL 20 MG/1
20 TABLET ORAL DAILY
Qty: 30 TAB | Refills: 1 | Status: SHIPPED | OUTPATIENT
Start: 2019-06-07 | End: 2019-07-11 | Stop reason: SDUPTHER

## 2019-06-07 RX ORDER — ALLOPURINOL 100 MG/1
200 TABLET ORAL DAILY
Qty: 60 TAB | Refills: 3 | Status: SHIPPED | OUTPATIENT
Start: 2019-06-07 | End: 2019-08-15 | Stop reason: SDUPTHER

## 2019-06-07 NOTE — PROGRESS NOTES
Promise Katz is a 34 y.o. male presenting today for Follow Up Chronic Condition (HTN Cholesterol ); Gout (pt states \" that he don't think the medication that was prescribe is working\"); Leg Pain (pt states \" that he is having pain on the side of pt right knee and behind the knee at the top of his calf\"); and Medication Refill  . HPI:  Promise Katz presents to the office today for hypertension, gout and right knee pain. Patient presents today noting he has ran out of his medication. He notes he has been compliant with taking his medication daily until he no longer hand any refills. He denies any chest pain or palpation. Patient also has a history of gout and was in the practice on 5/16/2019 for right knee pain. His las uric acid level was elevated at 8.1. He presents today complaining of right knee pain. He notes every since his gout episode he has been having posterior and lateral knee pain. Patient also presents today complaining of erectile dysfunction. Patient is requesting a prescription for Cialis. Review of Systems   Constitutional: Positive for malaise/fatigue. Respiratory: Negative for cough. Cardiovascular: Negative for chest pain and palpitations. Musculoskeletal: Positive for joint pain (right knee ) and myalgias. Neurological: Negative for dizziness and headaches. No Known Allergies    Current Outpatient Medications   Medication Sig Dispense Refill    tadalafil (CIALIS) 10 mg tablet Take 1 tablet 30 minutes prior to anticipated sexual activity as one single dose and not more than once daily. 30 Tab 1    lisinopril (PRINIVIL, ZESTRIL) 20 mg tablet Take 1 Tab by mouth daily. 30 Tab 1    allopurinol (ZYLOPRIM) 100 mg tablet Take 2 Tabs by mouth daily. 60 Tab 3    allopurinol (ZYLOPRIM) 100 mg tablet Take 1 Tab by mouth daily. 30 Tab 1    colchicine 0.6 mg tablet Take 1 tablet by mouth Q1Hour for gout pain.     NOT TO EXCEED 3 TABLETS IN 24 HOURS. 6 Tab 0    ketorolac (TORADOL) 10 mg tablet Take 1 Tab by mouth every six (6) hours as needed for Pain for up to 5 days. 20 Tab 0    HYDROcodone-acetaminophen (NORCO) 5-325 mg per tablet Take 1 Tab by mouth every four (4) hours as needed for Pain for up to 3 days. Max Daily Amount: 6 Tabs.  6 Tab 0    indomethacin (INDOCIN) 25 mg capsule Take 2 capsules by mouth, three times a day x 3 days, then take 1 capsule by mouth daily x 10 days 28 Cap 0       Past Medical History:   Diagnosis Date    Elevated uric acid in blood 03/05/2015    8.6 mg/dL    Hyperlipidemia     Hypertension     Medical non-compliance     Renal insufficiency 04/06/2011    BUN/sCr: 14/1.4, GFR >60    Right knee pain 09/20/2010    Plain Film NEG    Right knee pain 04/10/2015    Plain Film NEG       Past Surgical History:   Procedure Laterality Date    HX CYST REMOVAL      HX TONSILLECTOMY         Social History     Socioeconomic History    Marital status: LEGALLY      Spouse name: Not on file    Number of children: Not on file    Years of education: Not on file    Highest education level: Not on file   Occupational History    Not on file   Social Needs    Financial resource strain: Not on file    Food insecurity:     Worry: Not on file     Inability: Not on file    Transportation needs:     Medical: Not on file     Non-medical: Not on file   Tobacco Use    Smoking status: Never Smoker    Smokeless tobacco: Never Used   Substance and Sexual Activity    Alcohol use: Yes     Comment: socially    Drug use: No    Sexual activity: Yes     Partners: Female     Birth control/protection: Condom   Lifestyle    Physical activity:     Days per week: Not on file     Minutes per session: Not on file    Stress: Not on file   Relationships    Social connections:     Talks on phone: Not on file     Gets together: Not on file     Attends L[employer] day service: Not on file     Active member of club or organization: Not on file     Attends meetings of clubs or organizations: Not on file     Relationship status: Not on file    Intimate partner violence:     Fear of current or ex partner: Not on file     Emotionally abused: Not on file     Physically abused: Not on file     Forced sexual activity: Not on file   Other Topics Concern    Not on file   Social History Narrative    Not on file       Patient does not have an advanced directive on file    Vitals:    06/07/19 0822   BP: 145/80   Pulse: 75   Resp: 20   Temp: 97.7 °F (36.5 °C)   TempSrc: Oral   SpO2: 98%   Weight: 296 lb (134.3 kg)   Height: 6' (1.829 m)   PainSc:   7   PainLoc: Leg       Physical Exam   Constitutional: No distress. Cardiovascular: Normal rate, regular rhythm and normal heart sounds. Pulmonary/Chest: Effort normal and breath sounds normal.   Lymphadenopathy:     He has no cervical adenopathy. Nursing note and vitals reviewed. Hospital Outpatient Visit on 05/29/2019   Component Date Value Ref Range Status    WBC 05/29/2019 4.8  4.6 - 13.2 K/uL Final    RBC 05/29/2019 4.73  4.70 - 5.50 M/uL Final    HGB 05/29/2019 13.1  13.0 - 16.0 g/dL Final    HCT 05/29/2019 40.0  36.0 - 48.0 % Final    MCV 05/29/2019 84.6  74.0 - 97.0 FL Final    MCH 05/29/2019 27.7  24.0 - 34.0 PG Final    MCHC 05/29/2019 32.8  31.0 - 37.0 g/dL Final    RDW 05/29/2019 13.7  11.6 - 14.5 % Final    PLATELET 83/52/8004 672  135 - 420 K/uL Final    MPV 05/29/2019 9.9  9.2 - 11.8 FL Final    NEUTROPHILS 05/29/2019 39* 40 - 73 % Final    LYMPHOCYTES 05/29/2019 40  21 - 52 % Final    MONOCYTES 05/29/2019 14* 3 - 10 % Final    EOSINOPHILS 05/29/2019 7* 0 - 5 % Final    BASOPHILS 05/29/2019 0  0 - 2 % Final    ABS. NEUTROPHILS 05/29/2019 1.9  1.8 - 8.0 K/UL Final    ABS. LYMPHOCYTES 05/29/2019 1.9  0.9 - 3.6 K/UL Final    ABS. MONOCYTES 05/29/2019 0.7  0.05 - 1.2 K/UL Final    ABS. EOSINOPHILS 05/29/2019 0.3  0.0 - 0.4 K/UL Final    ABS.  BASOPHILS 05/29/2019 0.0  0.0 - 0.1 K/UL Final    DF 05/29/2019 AUTOMATED    Final    LIPID PROFILE 05/29/2019        Final    Cholesterol, total 05/29/2019 165  <200 MG/DL Final    Triglyceride 05/29/2019 193* <150 MG/DL Final    Comment: The drugs N-acetylcysteine (NAC) and  Metamiszole have been found to cause falsely  low results in this chemical assay. Please  be sure to submit blood samples obtained  BEFORE administration of either of these  drugs to assure correct results.  HDL Cholesterol 05/29/2019 37* 40 - 60 MG/DL Final    LDL, calculated 05/29/2019 89.4  0 - 100 MG/DL Final    VLDL, calculated 05/29/2019 38.6  MG/DL Final    CHOL/HDL Ratio 05/29/2019 4.5  0 - 5.0   Final    Hemoglobin A1c 05/29/2019 5.5  4.2 - 5.6 % Final    Comment: (NOTE)  HbA1C Interpretive Ranges  <5.7              Normal  5.7 - 6.4         Consider Prediabetes  >6.5              Consider Diabetes      Est. average glucose 05/29/2019 111  mg/dL Final    Comment: (NOTE)  The eAG should be interpreted with patient characteristics in mind   since ethnicity, interindividual differences, red cell lifespan,   variation in rates of glycation, etc. may affect the validity of the   calculation.       Sodium 05/29/2019 140  136 - 145 mmol/L Final    Potassium 05/29/2019 3.9  3.5 - 5.5 mmol/L Final    Chloride 05/29/2019 107  100 - 108 mmol/L Final    CO2 05/29/2019 24  21 - 32 mmol/L Final    Anion gap 05/29/2019 9  3.0 - 18 mmol/L Final    Glucose 05/29/2019 79  74 - 99 mg/dL Final    BUN 05/29/2019 11  7.0 - 18 MG/DL Final    Creatinine 05/29/2019 1.26  0.6 - 1.3 MG/DL Final    BUN/Creatinine ratio 05/29/2019 9* 12 - 20   Final    GFR est AA 05/29/2019 >60  >60 ml/min/1.73m2 Final    GFR est non-AA 05/29/2019 >60  >60 ml/min/1.73m2 Final    Comment: (NOTE)  Estimated GFR is calculated using the Modification of Diet in Renal   Disease (MDRD) Study equation, reported for both  Americans   (GFRAA) and non- Americans (GFRNA), and normalized to 1.73m2   body surface area. The physician must decide which value applies to   the patient. The MDRD study equation should only be used in   individuals age 25 or older. It has not been validated for the   following: pregnant women, patients with serious comorbid conditions,   or on certain medications, or persons with extremes of body size,   muscle mass, or nutritional status.  Calcium 05/29/2019 8.8  8.5 - 10.1 MG/DL Final    Bilirubin, total 05/29/2019 0.5  0.2 - 1.0 MG/DL Final    ALT (SGPT) 05/29/2019 39  16 - 61 U/L Final    AST (SGOT) 05/29/2019 37  15 - 37 U/L Final    Alk. phosphatase 05/29/2019 84  45 - 117 U/L Final    Protein, total 05/29/2019 8.4* 6.4 - 8.2 g/dL Final    Albumin 05/29/2019 3.9  3.4 - 5.0 g/dL Final    Globulin 05/29/2019 4.5* 2.0 - 4.0 g/dL Final    A-G Ratio 05/29/2019 0.9  0.8 - 1.7   Final    TSH 05/29/2019 2.02  0.36 - 3.74 uIU/mL Final   Hospital Outpatient Visit on 05/16/2019   Component Date Value Ref Range Status    Uric acid 05/16/2019 8.1* 2.6 - 7.2 MG/DL Final    Comment: The drugs N-acetylcysteine (NAC) and  Metamiszole have been found to cause falsely  low results in this chemical assay. Please  be sure to submit blood samples obtained  BEFORE administration of either of these  drugs to assure correct results. .  Results for orders placed or performed during the hospital encounter of 06/07/19   XR KNEE RT MIN 4 V    Narrative    RIGHT KNEE RADIOGRAPH-4 VIEWS    INDICATION: Right knee pain    COMPARISON: Right knee x-ray 9/15/2016    FINDINGS:  No evidence for acute fracture or dislocation. Joint spaces are grossly  maintained. There is a large suprapatellar joint effusion. Mild soft tissue  swelling anteriorly. Impression    IMPRESSION:  No evidence for acute fracture. Large joint effusion. Mild soft tissue swelling anteriorly. Assessment / Plan:      ICD-10-CM ICD-9-CM    1.  Hypertension, unspecified type I10 401.9 lisinopril (Thersia Kubas) 20 mg tablet   2. History of gout Z87.39 V12.29 allopurinol (ZYLOPRIM) 100 mg tablet   3. Acute gout of left foot, unspecified cause M10.9 274.01    4. Acute pain of right knee M25.561 719.46 XR KNEE RT MIN 4 V      REFERRAL TO ORTHOPEDICS   5. Erectile dysfunction, unspecified erectile dysfunction type N52.9 607.84 tadalafil (CIALIS) 10 mg tablet   6. Sleep apnea, unspecified type G47.30 780.57      HTN- blood pressure elevated. Elevated blood pressure may be secondary to increased pain  Gout- Allopurinol increased to 200 mg daily  Acute right knee pain- xray ordered. Referral to Ortho  Erectile Dysfunction- Cialis rx given  Per the sleep apnea referral, patient has severe sleep apnea. Follow-up and Dispositions    · Return if symptoms worsen or fail to improve. I asked the patient if he  had any questions and answered his  questions. The patient stated that he understands the treatment plan and agrees with the treatment plan    This document was created with a voice activated dictation system and may contain transcription errors.

## 2019-06-07 NOTE — PROGRESS NOTES
Chief Complaint   Patient presents with    Follow Up Chronic Condition     HTN Cholesterol     Gout     pt states \" that he don't think the medication that was prescribe is working\"    Leg Pain     pt states \" that he is having pain on the side of pt right knee and behind the knee at the top of his calf\"    Medication Refill

## 2019-06-08 ENCOUNTER — HOSPITAL ENCOUNTER (EMERGENCY)
Age: 39
Discharge: HOME | End: 2019-06-08
Attending: EMERGENCY MEDICINE
Payer: COMMERCIAL

## 2019-06-08 VITALS
HEART RATE: 76 BPM | SYSTOLIC BLOOD PRESSURE: 157 MMHG | RESPIRATION RATE: 18 BRPM | HEIGHT: 72 IN | BODY MASS INDEX: 40.09 KG/M2 | TEMPERATURE: 98.1 F | OXYGEN SATURATION: 96 % | DIASTOLIC BLOOD PRESSURE: 86 MMHG | WEIGHT: 296 LBS

## 2019-06-08 DIAGNOSIS — M25.461 KNEE EFFUSION, RIGHT: Primary | ICD-10-CM

## 2019-06-08 PROCEDURE — 96372 THER/PROPH/DIAG INJ SC/IM: CPT

## 2019-06-08 PROCEDURE — 99283 EMERGENCY DEPT VISIT LOW MDM: CPT

## 2019-06-08 PROCEDURE — 74011250636 HC RX REV CODE- 250/636: Performed by: PHYSICIAN ASSISTANT

## 2019-06-08 RX ORDER — HYDROCODONE BITARTRATE AND ACETAMINOPHEN 5; 325 MG/1; MG/1
1 TABLET ORAL
Qty: 6 TAB | Refills: 0 | Status: SHIPPED | OUTPATIENT
Start: 2019-06-08 | End: 2019-06-11

## 2019-06-08 RX ORDER — KETOROLAC TROMETHAMINE 30 MG/ML
30 INJECTION, SOLUTION INTRAMUSCULAR; INTRAVENOUS
Status: COMPLETED | OUTPATIENT
Start: 2019-06-08 | End: 2019-06-08

## 2019-06-08 RX ORDER — KETOROLAC TROMETHAMINE 10 MG/1
10 TABLET, FILM COATED ORAL
Qty: 20 TAB | Refills: 0 | Status: SHIPPED | OUTPATIENT
Start: 2019-06-08 | End: 2019-06-13

## 2019-06-08 RX ADMIN — KETOROLAC TROMETHAMINE 30 MG: 30 INJECTION, SOLUTION INTRAMUSCULAR at 12:53

## 2019-06-08 NOTE — ED PROVIDER NOTES
EMERGENCY DEPARTMENT HISTORY AND PHYSICAL EXAM    Date: 6/8/2019  Patient Name: Rich Reeder    History of Presenting Illness     Chief Complaint   Patient presents with    Knee Pain         History Provided By: patient      Additional History (Context): Rich Reeder is a 22-year-old male here with right knee pain. Patient states he had x-ray done per PCP as an outpatient yesterday but was not told results. Patient states he has pain with range of motion and with walking but denies warmth in joint or redness in joints, fever, chills, injury or trauma. PCP: Chito Silva NP    Current Facility-Administered Medications   Medication Dose Route Frequency Provider Last Rate Last Dose    ketorolac tromethamine (TORADOL) 60 mg/2 mL injection 30 mg  30 mg IntraMUSCular NOW Saskia Calle PA-C         Current Outpatient Medications   Medication Sig Dispense Refill    ketorolac (TORADOL) 10 mg tablet Take 1 Tab by mouth every six (6) hours as needed for Pain for up to 5 days. 20 Tab 0    HYDROcodone-acetaminophen (NORCO) 5-325 mg per tablet Take 1 Tab by mouth every four (4) hours as needed for Pain for up to 3 days. Max Daily Amount: 6 Tabs. 6 Tab 0    lisinopril (PRINIVIL, ZESTRIL) 20 mg tablet Take 1 Tab by mouth daily. 30 Tab 1    allopurinol (ZYLOPRIM) 100 mg tablet Take 2 Tabs by mouth daily. 60 Tab 3    indomethacin (INDOCIN) 25 mg capsule Take 2 capsules by mouth, three times a day x 3 days, then take 1 capsule by mouth daily x 10 days 28 Cap 0    allopurinol (ZYLOPRIM) 100 mg tablet Take 1 Tab by mouth daily. 30 Tab 1    colchicine 0.6 mg tablet Take 1 tablet by mouth Q1Hour for gout pain.     NOT TO EXCEED 3 TABLETS IN 24 HOURS. 6 Tab 0       Past History     Past Medical History:  Past Medical History:   Diagnosis Date    Elevated uric acid in blood 03/05/2015    8.6 mg/dL    Hyperlipidemia     Hypertension     Medical non-compliance     Renal insufficiency 04/06/2011 BUN/sCr: 14/1.4, GFR >60    Right knee pain 09/20/2010    Plain Film NEG    Right knee pain 04/10/2015    Plain Film NEG       Past Surgical History:  Past Surgical History:   Procedure Laterality Date    HX CYST REMOVAL      HX TONSILLECTOMY         Family History:  History reviewed. No pertinent family history. Social History:  Social History     Tobacco Use    Smoking status: Never Smoker    Smokeless tobacco: Never Used   Substance Use Topics    Alcohol use: Yes     Comment: socially    Drug use: No       Allergies:  No Known Allergies      Review of Systems       Review of Systems   Constitutional: Negative for chills and fever. HENT: Negative for nasal congestion, sore throat, rhinorrhea  Eyes: Negative. Respiratory: pos cough and negative for shortness of breath. Cardiovascular: Negative for chest pain and palpitations. Gastrointestinal: Negative for abdominal pain, constipation, diarrhea, nausea and vomiting. Genitourinary: Negative. Negative for difficulty urinating and flank pain. Musculoskeletal: Negative for back pain. Negative for gait problem and neck pain. Pos knee pain  Skin: Negative. Allergic/Immunologic: Negative. Neurological: Negative for dizziness, weakness, numbness and headaches. Psychiatric/Behavioral: Negative. All other systems reviewed and are negative. All Other Systems Negative  Physical Exam     Vitals:    06/08/19 1235   BP: 157/86   Pulse: 76   Resp: 18   Temp: 98.1 °F (36.7 °C)   SpO2: 96%   Weight: 134.3 kg (296 lb)   Height: 6' (1.829 m)     Physical Exam   Constitutional: He is oriented to person, place, and time. He appears well-developed and well-nourished. No distress. NAD, well hydrated, non toxic     HENT:   Head: Normocephalic and atraumatic. Nose: Nose normal.   Mouth/Throat: Oropharynx is clear and moist. No oropharyngeal exudate. Eyes: Pupils are equal, round, and reactive to light.  Conjunctivae and EOM are normal.   Neck: Normal range of motion. Neck supple. Cardiovascular: Normal rate, regular rhythm and normal heart sounds. No murmur heard. Pulmonary/Chest: Effort normal and breath sounds normal. No respiratory distress. He has no wheezes. He has no rales. Abdominal: Soft. He exhibits no distension. There is no tenderness. There is no guarding. Musculoskeletal:        Right knee: He exhibits decreased range of motion and effusion. He exhibits no swelling, no ecchymosis, no deformity, no laceration, no erythema, normal alignment, no LCL laxity, normal patellar mobility, no bony tenderness, normal meniscus and no MCL laxity. Tenderness found. Medial joint line and lateral joint line tenderness noted. Lymphadenopathy:     He has no cervical adenopathy. Neurological: He is alert and oriented to person, place, and time. No cranial nerve deficit. Coordination normal.   Skin: Skin is warm. No rash noted. He is not diaphoretic. Psychiatric: He has a normal mood and affect. His behavior is normal.   Nursing note and vitals reviewed. Diagnostic Study Results     Labs -   No results found for this or any previous visit (from the past 12 hour(s)). Radiologic Studies -   No orders to display     CT Results  (Last 48 hours)    None        CXR Results  (Last 48 hours)    None            Medical Decision Making   I am the first provider for this patient. I reviewed the vital signs, available nursing notes, past medical history, past surgical history, family history and social history. Vital Signs-Reviewed the patient's vital signs. Records Reviewed: Nursing notes, old medical records and any previous labs, imaging, visits, consultations pertinent to patient care    Procedures:  Procedures    Provider Notes (Medical Decision Making):     Xray from yesterday negative for acute process, positive for large joint effusion. No signs of septic joint. Vital signs stable. Appropriate for out pt management.  Will discuss supportive treatment, NSAIDS, RICE and ortho f/u. Discussed proper way to take medications. Discussed treatment plan, return precautions, symptomatic relief, and expected time to improvement. All questions answered. Patient is stable for discharge and outpatient management. MED RECONCILIATION:  Current Facility-Administered Medications   Medication Dose Route Frequency    ketorolac tromethamine (TORADOL) 60 mg/2 mL injection 30 mg  30 mg IntraMUSCular NOW     Current Outpatient Medications   Medication Sig    ketorolac (TORADOL) 10 mg tablet Take 1 Tab by mouth every six (6) hours as needed for Pain for up to 5 days.  HYDROcodone-acetaminophen (NORCO) 5-325 mg per tablet Take 1 Tab by mouth every four (4) hours as needed for Pain for up to 3 days. Max Daily Amount: 6 Tabs.  lisinopril (PRINIVIL, ZESTRIL) 20 mg tablet Take 1 Tab by mouth daily.  allopurinol (ZYLOPRIM) 100 mg tablet Take 2 Tabs by mouth daily.  indomethacin (INDOCIN) 25 mg capsule Take 2 capsules by mouth, three times a day x 3 days, then take 1 capsule by mouth daily x 10 days    allopurinol (ZYLOPRIM) 100 mg tablet Take 1 Tab by mouth daily.  colchicine 0.6 mg tablet Take 1 tablet by mouth Q1Hour for gout pain. NOT TO EXCEED 3 TABLETS IN 24 HOURS. Disposition:  home    DISCHARGE NOTE:     Pt has been reexamined. Patient has no new complaints, changes, or physical findings. Care plan outlined and precautions discussed. Discussed proper way to take medications. Discussed treatment plan, return precautions, symptomatic relief, and expected time to improvement. All questions answered. Patient is stable for discharge and outpatient management. Patient is ready to go home.     Follow-up Information     Follow up With Specialties Details Why [address] [facility] EMERGENCY DEPT Emergency Medicine   [address] [facility] [place]  [phone]    Destiny Au NP Nurse Practitioner   [address], [address] [facility]  [phone]          Current Discharge Medication List      START taking these medications    Details   ketorolac (TORADOL) 10 mg tablet Take 1 Tab by mouth every six (6) hours as needed for Pain for up to 5 days. Qty: 20 Tab, Refills: 0      HYDROcodone-acetaminophen (NORCO) 5-325 mg per tablet Take 1 Tab by mouth every four (4) hours as needed for Pain for up to 3 days. Max Daily Amount: 6 Tabs. Qty: 6 Tab, Refills: 0    Associated Diagnoses: Knee effusion, right                   Diagnosis     Clinical Impression:   1.  Knee effusion, right

## 2019-06-08 NOTE — DISCHARGE INSTRUCTIONS

## 2019-06-09 RX ORDER — TADALAFIL 10 MG/1
TABLET ORAL
Qty: 30 TAB | Refills: 1 | Status: SHIPPED | OUTPATIENT
Start: 2019-06-09 | End: 2019-08-09

## 2019-06-12 NOTE — PROGRESS NOTES
Please let patient know the xray results showed large amount of fluid on his knee.  Please verify patient has an appointment with Ortho

## 2019-06-12 NOTE — PROGRESS NOTES
I called pt and pt made aware pt state \" that he has an appointment tomorrow with Ortho  06/13/2019 @ 2:00 pm

## 2019-06-13 ENCOUNTER — HOSPITAL ENCOUNTER (OUTPATIENT)
Dept: LAB | Age: 39
Discharge: HOME | End: 2019-06-13
Payer: COMMERCIAL

## 2019-06-13 ENCOUNTER — OFFICE VISIT (OUTPATIENT)
Dept: ORTHOPEDIC SURGERY | Facility: CLINIC | Age: 39
End: 2019-06-13

## 2019-06-13 VITALS
HEART RATE: 88 BPM | WEIGHT: 296.4 LBS | RESPIRATION RATE: 24 BRPM | OXYGEN SATURATION: 97 % | DIASTOLIC BLOOD PRESSURE: 94 MMHG | BODY MASS INDEX: 39.28 KG/M2 | HEIGHT: 73 IN | TEMPERATURE: 98.4 F | SYSTOLIC BLOOD PRESSURE: 154 MMHG

## 2019-06-13 DIAGNOSIS — S83.91XA SPRAIN OF RIGHT KNEE, UNSPECIFIED LIGAMENT, INITIAL ENCOUNTER: ICD-10-CM

## 2019-06-13 DIAGNOSIS — M25.561 ACUTE PAIN OF RIGHT KNEE: ICD-10-CM

## 2019-06-13 DIAGNOSIS — M25.461 EFFUSION OF BURSA OF RIGHT KNEE: ICD-10-CM

## 2019-06-13 DIAGNOSIS — M10.9 ACUTE GOUT OF RIGHT KNEE, UNSPECIFIED CAUSE: Primary | ICD-10-CM

## 2019-06-13 DIAGNOSIS — M10.9 ACUTE GOUT OF RIGHT KNEE, UNSPECIFIED CAUSE: ICD-10-CM

## 2019-06-13 LAB
BODY FLD TYPE: NORMAL
CRYSTALS FLD MICRO: NORMAL

## 2019-06-13 PROCEDURE — 87070 CULTURE OTHR SPECIMN AEROBIC: CPT

## 2019-06-13 PROCEDURE — 89060 EXAM SYNOVIAL FLUID CRYSTALS: CPT

## 2019-06-13 RX ORDER — INDOMETHACIN 50 MG/1
50 CAPSULE ORAL 3 TIMES DAILY
Qty: 60 CAP | Refills: 2 | Status: SHIPPED | OUTPATIENT
Start: 2019-06-13 | End: 2019-08-09

## 2019-06-13 RX ORDER — BETAMETHASONE SODIUM PHOSPHATE AND BETAMETHASONE ACETATE 3; 3 MG/ML; MG/ML
6 INJECTION, SUSPENSION INTRA-ARTICULAR; INTRALESIONAL; INTRAMUSCULAR; SOFT TISSUE ONCE
Qty: 0.5 ML | Refills: 0
Start: 2019-06-13 | End: 2019-06-13

## 2019-06-13 NOTE — PROGRESS NOTES
Patient: Jarad Basurto                MRN: [id number]       SSN: [id number]  YOB: 1980        AGE: 34 y.o. SEX: male    PCP: Valdene Kanner, NP  06/13/19    Chief Complaint   Patient presents with    Knee Pain     right     HISTORY:  Jarad Basurto is a 34 y.o. male who is seen for right knee pain. He has been experiencing increased knee pain for the past week. There is no history of injury. He tripped going up stairs in his house. His right knee gave out on him. He had right knee pain and swelling for 2 days following the injury. He notes pain with standing, walking, and stair climbing. He experiences startup pain after sitting. He has a h/o gout. He kneels to buff floors at work. 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 He lives in [place] with his [facility]. He has 8 children (7 girls, 1 boy). He recently lost 10 pounds. Mr. Lilian Candelaria weighs 296 lbs and is 6'1\" tall.        Lab Results   Component Value Date/Time    Hemoglobin A1c 5.5 05/29/2019 03:08 PM     Weight Metrics 6/13/2019 6/8/2019 6/7/2019 5/16/2019 4/9/2019 10/8/2018 2/19/2018   Weight 296 lb 6.4 oz 296 lb 296 lb 301 lb 3.2 oz 317 lb 297 lb 3.2 oz 284 lb   BMI 39.11 kg/m2 40.14 kg/m2 40.14 kg/m2 40.85 kg/m2 42.99 kg/m2 40.31 kg/m2 38.52 kg/m2       Patient Active Problem List   Diagnosis Code    Acute pain of right knee M25.561    Swelling of knee joint M25.469    Essential hypertension I10    Hypertension I10    Renal insufficiency N28.9    Medical non-compliance Z91.19    Hyperlipidemia E78.5    Right knee pain M25.561    Elevated uric acid in blood E79.0    Obesity, morbid (HCC) E66.01     REVIEW OF SYSTEMS: All Below are Negative except: See HPI   Constitutional: negative for fever, chills, and weight loss. Cardiovascular: negative for chest pain, claudication, leg swelling, SOB, LEO   Gastrointestinal: Negative for pain, N/V/C/D, Blood in stool or urine, dysuria, hematuria, incontinence, pelvic pain. Musculoskeletal: See HPI   Neurological: Negative for dizziness and weakness. Negative for headaches, Visual changes, confusion, seizures   Phychiatric/Behavioral: Negative for depression, memory loss, substance abuse. Extremities: Negative for hair changes, rash, or skin lesion changes. Hematologic: Negative for bleeding problems, bruising, pallor or swollen lymph nodes   Peripheral Vascular: No calf pain, no circulation deficits.     Social History     Socioeconomic History    Marital status: UNKNOWN     Spouse name: Not on file    Number of children: Not on file    Years of education: Not on file    Highest education level: Not on file   Occupational History    Not on file   Social Needs    Financial resource strain: Not on file    Food insecurity:     Worry: Not on file     Inability: Not on file    Transportation needs:     Medical: Not on file     Non-medical: Not on file   Tobacco Use    Smoking status: Never Smoker    Smokeless tobacco: Never Used   Substance and Sexual Activity    Alcohol use: Yes     Comment: socially    Drug use: No    Sexual activity: Yes     Partners: Female     Birth control/protection: Condom   Lifestyle    Physical activity:     Days per week: Not on file     Minutes per session: Not on file    Stress: Not on file   Relationships    Social connections:     Talks on phone: Not on file     Gets together: Not on file     Attends Cheondo[employer] service: Not on file     Active member of club or organization: Not on file     Attends meetings of clubs or organizations: Not on file     Relationship status: Not on file    Intimate partner violence:     Fear of current or ex partner: Not on file     Emotionally abused: Not on file     Physically abused: Not on file     Forced sexual activity: Not on file   Other Topics Concern    Not on file   Social History Narrative    Not on file      No Known Allergies   Current Outpatient Medications   Medication Sig    tadalafil (CIALIS) 10 mg tablet Take 1 tablet 30 minutes prior to anticipated sexual activity as one single dose and not more than once daily.  ketorolac (TORADOL) 10 mg tablet Take 1 Tab by mouth every six (6) hours as needed for Pain for up to 5 days.  lisinopril (PRINIVIL, ZESTRIL) 20 mg tablet Take 1 Tab by mouth daily.  allopurinol (ZYLOPRIM) 100 mg tablet Take 1 Tab by mouth daily.  colchicine 0.6 mg tablet Take 1 tablet by mouth Q1Hour for gout pain. NOT TO EXCEED 3 TABLETS IN 24 HOURS.  allopurinol (ZYLOPRIM) 100 mg tablet Take 2 Tabs by mouth daily.  indomethacin (INDOCIN) 25 mg capsule Take 2 capsules by mouth, three times a day x 3 days, then take 1 capsule by mouth daily x 10 days     No current facility-administered medications for this visit. PHYSICAL EXAMINATION:  Visit Vitals  BP (!) 154/94 (BP 1 Location: Left arm, BP Patient Position: Sitting)   Pulse 88   Temp 98.4 °F (36.9 °C) (Oral)   Resp 24   Ht 6' 1\" (1.854 m)   Wt 296 lb 6.4 oz (134.4 kg)   SpO2 97% Comment: RA   BMI 39.11 kg/m²      ORTHO EXAMINATION:  Examination Right knee Left knee   Skin Intact Intact   Range of motion 120-0 120-0   Effusion +++ -   Medial joint line tenderness + -   Lateral joint line tenderness - -   Popliteal tenderness - -   Osteophytes palpable + -   Yariels - -   Patella crepitus - -   Anterior drawer - -   Lateral laxity - -   Medial laxity - -   Varus deformity - -   Valgus deformity - -   Pretibial edema - -   Calf tenderness - -        RADIOGRAPHS:  XR RIGHT KNEE 6/7/19 [facility]  IMPRESSION:  No evidence for acute fracture.   Large joint effusion. Mild soft tissue swelling anteriorly  -I have independently reviewed these images during this office visit. -Dr. Estee Denny:  Three views - No fractures, large effusion, mild joint space narrowing, no osteophytes present. Kellgren Peter grade 1. Condylar flattening    IMPRESSION:      ICD-10-CM ICD-9-CM    1. Acute gout of right knee, unspecified cause M10.9 274.01 betamethasone (CELESTONE SOLUSPAN) 6 mg/mL injection      BETAMETHASONE ACETATE & SODIUM PHOSPHATE INJECTION 3 MG EA.      DRAIN/INJECT LARGE JOINT/BURSA      indomethacin (INDOCIN) 50 mg capsule      CULTURE, BODY FLUID W GRAM STAIN      CRYSTALS, SYNOVIAL FLUID   2. Effusion of bursa of right knee M25.461 719.06 betamethasone (CELESTONE SOLUSPAN) 6 mg/mL injection      BETAMETHASONE ACETATE & SODIUM PHOSPHATE INJECTION 3 MG EA.      DRAIN/INJECT LARGE JOINT/BURSA      indomethacin (INDOCIN) 50 mg capsule      CULTURE, BODY FLUID W GRAM STAIN      CRYSTALS, SYNOVIAL FLUID   3. Sprain of right knee, unspecified ligament, initial encounter S83. 91XA 844.9 betamethasone (CELESTONE SOLUSPAN) 6 mg/mL injection      BETAMETHASONE ACETATE & SODIUM PHOSPHATE INJECTION 3 MG EA.      DRAIN/INJECT LARGE JOINT/BURSA      indomethacin (INDOCIN) 50 mg capsule      CULTURE, BODY FLUID W GRAM STAIN      CRYSTALS, SYNOVIAL FLUID   4. Acute pain of right knee M25.561 719.46 betamethasone (CELESTONE SOLUSPAN) 6 mg/mL injection      BETAMETHASONE ACETATE & SODIUM PHOSPHATE INJECTION 3 MG EA.      DRAIN/INJECT LARGE JOINT/BURSA      indomethacin (INDOCIN) 50 mg capsule      CULTURE, BODY FLUID W GRAM STAIN      CRYSTALS, SYNOVIAL FLUID        PLAN:  After timeout and under sterile conditions, right knee aspirated 70 cc of cloudy and turbid fluid--c/w gout. His knee was injected 4 cc 0.25% Marcaine and 0.5 cc Celestone. The fluid was sent to the lab for culture and crystal analysis. Indocin Rx provided. He will follow up in 4 days. Scribed by Bev Jimenez (Reyna Arteaga) as dictated by Kaleb Fontaine MD

## 2019-06-13 NOTE — PROGRESS NOTES
1. Have you been to the ER, urgent care clinic since your last visit? Hospitalized since your last visit? Yes When: 06/08/2019 Where: [facility] Reason for visit: knee     2. Have you seen or consulted any other health care providers outside of the [address] [place] [address] since your last visit? Include any pap smears or colon screening.  No

## 2019-06-13 NOTE — LETTER
6/13/2019 3:54 PM 
 
Mr. [address] [facility] [address] The above patient was seen in our office today.   
 
 
 
Sincerely, 
 
 
Pedro Luis Banks MD

## 2019-06-14 ENCOUNTER — TELEPHONE (OUTPATIENT)
Dept: FAMILY MEDICINE CLINIC | Facility: CLINIC | Age: 39
End: 2019-06-14

## 2019-06-14 NOTE — TELEPHONE ENCOUNTER
[facility] states the needs an billable order for CPAP and needs note for sleep study befor 04/30/19. They also need demographic sheet.   Please fax to [phone]

## 2019-06-17 ENCOUNTER — OFFICE VISIT (OUTPATIENT)
Dept: ORTHOPEDIC SURGERY | Facility: CLINIC | Age: 39
End: 2019-06-17

## 2019-06-17 VITALS
OXYGEN SATURATION: 98 % | BODY MASS INDEX: 39.25 KG/M2 | HEART RATE: 62 BPM | TEMPERATURE: 97.8 F | RESPIRATION RATE: 16 BRPM | SYSTOLIC BLOOD PRESSURE: 142 MMHG | DIASTOLIC BLOOD PRESSURE: 80 MMHG | HEIGHT: 73 IN | WEIGHT: 296.2 LBS

## 2019-06-17 DIAGNOSIS — M10.9 ACUTE GOUT OF RIGHT KNEE, UNSPECIFIED CAUSE: Primary | ICD-10-CM

## 2019-06-17 NOTE — PROGRESS NOTES
Patient: Licha Lemos                MRN: [id number]       SSN: [id number]  YOB: 1980        AGE: 34 y.o. SEX: male    PCP: Ana Gutierrez NP  06/17/19    Chief Complaint   Patient presents with    Knee Pain     right knee pain      HISTORY:  Licha Lemos is a 34 y.o. male who is seen for right knee pain. He responded nicely to his aspiration and injection last ov. The Indocin also helped. He felt a sharp pain and tightness in his right knee this morning. He had been been experiencing knee pain for about 1 week. He tripped going up stairs in his house. His right knee gave out on him. He developed right knee pain and swelling for 2 days following the injury. He has a h/o gout. He kneels to buff floors at work. 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 He lives in [place] [facility]. He has 8 children (7 girls, 1 boy). He recently lost 10 pounds. Mr. RIVER [facility] weighs 296 lbs and is 6'1\" tall.        Lab Results   Component Value Date/Time    Hemoglobin A1c 5.5 05/29/2019 03:08 PM     Weight Metrics 6/17/2019 6/13/2019 6/8/2019 6/7/2019 5/16/2019 4/9/2019 10/8/2018   Weight 296 lb 3.2 oz 296 lb 6.4 oz 296 lb 296 lb 301 lb 3.2 oz 317 lb 297 lb 3.2 oz   BMI 39.08 kg/m2 39.11 kg/m2 40.14 kg/m2 40.14 kg/m2 40.85 kg/m2 42.99 kg/m2 40.31 kg/m2       Patient Active Problem List   Diagnosis Code    Acute pain of right knee M25.561    Swelling of knee joint M25.469    Essential hypertension I10    Hypertension I10    Renal insufficiency N28.9    Medical non-compliance Z91.19    Hyperlipidemia E78.5    Right knee pain M25.561    Elevated uric acid in blood E79.0    Obesity, morbid (HCC) E66.01     REVIEW OF SYSTEMS: All Below are Negative except: See HPI   Constitutional: negative for fever, chills, and weight loss. Cardiovascular: negative for chest pain, claudication, leg swelling, SOB, LEO   Gastrointestinal: Negative for pain, N/V/C/D, Blood in stool or urine, dysuria, hematuria, incontinence, pelvic pain. Musculoskeletal: See HPI   Neurological: Negative for dizziness and weakness. Negative for headaches, Visual changes, confusion, seizures   Phychiatric/Behavioral: Negative for depression, memory loss, substance abuse. Extremities: Negative for hair changes, rash, or skin lesion changes. Hematologic: Negative for bleeding problems, bruising, pallor or swollen lymph nodes   Peripheral Vascular: No calf pain, no circulation deficits.     Social History     Socioeconomic History    Marital status: UNKNOWN     Spouse name: Not on file    Number of children: Not on file    Years of education: Not on file    Highest education level: Not on file   Occupational History    Not on file   Social Needs    Financial resource strain: Not on file    Food insecurity:     Worry: Not on file     Inability: Not on file    Transportation needs:     Medical: Not on file     Non-medical: Not on file   Tobacco Use    Smoking status: Never Smoker    Smokeless tobacco: Never Used   Substance and Sexual Activity    Alcohol use: Yes     Comment: socially    Drug use: No    Sexual activity: Yes     Partners: Female     Birth control/protection: Condom   Lifestyle    Physical activity:     Days per week: Not on file     Minutes per session: Not on file    Stress: Not on file   Relationships    Social connections:     Talks on phone: Not on file     Gets together: Not on file     Attends [employer] service: Not on file     Active member of club or organization: Not on file     Attends meetings of clubs or organizations: Not on file Relationship status: Not on file    Intimate partner violence:     Fear of current or ex partner: Not on file     Emotionally abused: Not on file     Physically abused: Not on file     Forced sexual activity: Not on file   Other Topics Concern    Not on file   Social History Narrative    Not on file      No Known Allergies   Current Outpatient Medications   Medication Sig    indomethacin (INDOCIN) 50 mg capsule Take 1 Cap by mouth three (3) times daily for 90 days.  tadalafil (CIALIS) 10 mg tablet Take 1 tablet 30 minutes prior to anticipated sexual activity as one single dose and not more than once daily.  lisinopril (PRINIVIL, ZESTRIL) 20 mg tablet Take 1 Tab by mouth daily.  allopurinol (ZYLOPRIM) 100 mg tablet Take 2 Tabs by mouth daily.  indomethacin (INDOCIN) 25 mg capsule Take 2 capsules by mouth, three times a day x 3 days, then take 1 capsule by mouth daily x 10 days    allopurinol (ZYLOPRIM) 100 mg tablet Take 1 Tab by mouth daily.  colchicine 0.6 mg tablet Take 1 tablet by mouth Q1Hour for gout pain. NOT TO EXCEED 3 TABLETS IN 24 HOURS. No current facility-administered medications for this visit.        PHYSICAL EXAMINATION:  Visit Vitals  /80   Pulse 62   Temp 97.8 °F (36.6 °C) (Oral)   Resp 16   Ht 6' 1\" (1.854 m)   Wt 296 lb 3.2 oz (134.4 kg)   SpO2 98%   BMI 39.08 kg/m²      ORTHO EXAMINATION:  Examination Right knee Left knee   Skin Intact Intact   Range of motion 120-0 120-0   Effusion +++ -   Medial joint line tenderness + -   Lateral joint line tenderness - -   Popliteal tenderness - -   Osteophytes palpable + -   Yariels - -   Patella crepitus - -   Anterior drawer - -   Lateral laxity - -   Medial laxity - -   Varus deformity - -   Valgus deformity - -   Pretibial edema - -   Calf tenderness - -     CRYSTALS, SYNOVIAL FLUID 6/13/19 [facility]  Component Value Flag Ref Range Units Status   FLUID TYPE(7) ASPIRATE      Final   Crystals, body fluid      Final   URIC ACID CRYSTALS PRESENT    Comment:   RARE. FEW WBCs     CULTURE, BODY FLUID W GRAM STAIN 6/13/19 [facility]  Component Value Ref Range & Units Status   Special Requests: NO SPECIAL REQUESTS    Preliminary   GRAM STAIN FEW WBC'S    Preliminary   GRAM STAIN NO ORGANISMS SEEN    Preliminary   Culture result: NO GROWTH 4 DAYS    Preliminary     RADIOGRAPHS:  XR RIGHT KNEE 6/7/19 [facility]  IMPRESSION:  No evidence for acute fracture. Large joint effusion. Mild soft tissue swelling anteriorly  -I have independently reviewed these images during this office visit. -Dr. Vearl Kawasaki:  Three views - No fractures, large effusion, mild joint space narrowing, no osteophytes present. Kellgren Peter grade 1. Condylar flattening    IMPRESSION:      ICD-10-CM ICD-9-CM    1. Acute gout of right knee, unspecified cause M10.9 274.01         PLAN: There is no need for surgery or repeat aspiration at this time. JAYY Indocin. He was provided a work note stating that he was seen today. He will follow up as needed.       Scribed by Camryn Dillon ([address]) as dictated by Chandan Perez MD

## 2019-06-17 NOTE — LETTER
6/17/2019 1:28 PM 
 
Mr. [address] [place] [id number] THE ABOVE PATIENT WAS SEEN TODAY.  
 
 
Sincerely, 
 
 
Jil Lennox, MD

## 2019-06-18 LAB
BACTERIA SPEC CULT: NORMAL
GRAM STN SPEC: NORMAL
GRAM STN SPEC: NORMAL
SERVICE CMNT-IMP: NORMAL

## 2019-06-21 DIAGNOSIS — G47.30 SLEEP APNEA, UNSPECIFIED TYPE: Primary | ICD-10-CM

## 2019-06-24 ENCOUNTER — OFFICE VISIT (OUTPATIENT)
Dept: ORTHOPEDIC SURGERY | Facility: CLINIC | Age: 39
End: 2019-06-24

## 2019-06-24 VITALS
SYSTOLIC BLOOD PRESSURE: 188 MMHG | BODY MASS INDEX: 39.2 KG/M2 | HEART RATE: 66 BPM | WEIGHT: 295.8 LBS | DIASTOLIC BLOOD PRESSURE: 107 MMHG | HEIGHT: 73 IN | OXYGEN SATURATION: 97 % | TEMPERATURE: 96.8 F | RESPIRATION RATE: 18 BRPM

## 2019-06-24 DIAGNOSIS — M25.461 EFFUSION OF BURSA OF RIGHT KNEE: ICD-10-CM

## 2019-06-24 DIAGNOSIS — M25.561 ACUTE PAIN OF RIGHT KNEE: ICD-10-CM

## 2019-06-24 DIAGNOSIS — S83.91XA SPRAIN OF RIGHT KNEE, UNSPECIFIED LIGAMENT, INITIAL ENCOUNTER: ICD-10-CM

## 2019-06-24 DIAGNOSIS — M10.9 ACUTE GOUT OF RIGHT KNEE, UNSPECIFIED CAUSE: Primary | ICD-10-CM

## 2019-06-24 RX ORDER — BETAMETHASONE SODIUM PHOSPHATE AND BETAMETHASONE ACETATE 3; 3 MG/ML; MG/ML
6 INJECTION, SUSPENSION INTRA-ARTICULAR; INTRALESIONAL; INTRAMUSCULAR; SOFT TISSUE ONCE
Qty: 0.5 ML | Refills: 0
Start: 2019-06-24 | End: 2019-06-24

## 2019-06-24 NOTE — LETTER
6/24/2019 5:03 PM 
 
. Chris [address] [address] THE ABOVE PATIENT WAS SEEN TODAY.  
 
 
Sincerely, 
 
 
Cesario Calvillo MD

## 2019-06-24 NOTE — PROGRESS NOTES
Patient: Amelie Starr                MRN: [id number]       SSN: [id number]  YOB: 1980        AGE: 34 y.o. SEX: male    PCP: Christel Arechiga NP  06/24/19    Chief Complaint   Patient presents with    Knee Pain     Right     HISTORY:  Amelie Starr is a 34 y.o. male who is seen for right knee pain. He responded nicely to his aspiration and injection at previous ov. The Indocin also helped. His pain and swelling returned a few days ago. He notes kneecap pain. He is tender even to light touch. He had to miss several days of work due to his knee pain - his brother in law stopped him from working. He felt a sharp pain and tightness in his right knee this morning. He had been been experiencing knee pain for about 1 week. He tripped going up stairs in his house. His right knee gave out on him. He developed right knee pain and swelling for 2 days following the injury. He has a h/o gout. He kneels to buff floors at work. He had DCed Indocin last OV and started to take Ibuprofen. The Ibuprofen had not helped him much so he had taken a few doses of Indocin again. He had been seen by his PCP who provided him an Rx for Allopurinol which he has not started yet. 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 He lives in [place] with his ficiara. He has 8 children  21, 24, 16, 15, 8, 5, 2 yo daughters and one 12 yo son. He recently lost 10 pounds.  He knows [facility] family members and met her at a wedding once - but she was very snobby. Mr. Ginny Quick weighs 296 lbs and is 6'1\" tall. Lab Results   Component Value Date/Time    Hemoglobin A1c 5.5 05/29/2019 03:08 PM     Weight Metrics 6/24/2019 6/17/2019 6/13/2019 6/8/2019 6/7/2019 5/16/2019 4/9/2019   Weight 295 lb 12.8 oz 296 lb 3.2 oz 296 lb 6.4 oz 296 lb 296 lb 301 lb 3.2 oz 317 lb   BMI 39.03 kg/m2 39.08 kg/m2 39.11 kg/m2 40.14 kg/m2 40.14 kg/m2 40.85 kg/m2 42.99 kg/m2       Patient Active Problem List   Diagnosis Code    Acute pain of right knee M25.561    Swelling of knee joint M25.469    Essential hypertension I10    Hypertension I10    Renal insufficiency N28.9    Medical non-compliance Z91.19    Hyperlipidemia E78.5    Right knee pain M25.561    Elevated uric acid in blood E79.0    Obesity, morbid (HCC) E66.01     REVIEW OF SYSTEMS: All Below are Negative except: See HPI   Constitutional: negative for fever, chills, and weight loss. Cardiovascular: negative for chest pain, claudication, leg swelling, SOB, LEO   Gastrointestinal: Negative for pain, N/V/C/D, Blood in stool or urine, dysuria, hematuria, incontinence, pelvic pain. Musculoskeletal: See HPI   Neurological: Negative for dizziness and weakness. Negative for headaches, Visual changes, confusion, seizures   Phychiatric/Behavioral: Negative for depression, memory loss, substance abuse. Extremities: Negative for hair changes, rash, or skin lesion changes. Hematologic: Negative for bleeding problems, bruising, pallor or swollen lymph nodes   Peripheral Vascular: No calf pain, no circulation deficits.     Social History     Socioeconomic History    Marital status: UNKNOWN     Spouse name: Not on file    Number of children: Not on file    Years of education: Not on file    Highest education level: Not on file   Occupational History    Not on file   Social Needs    Financial resource strain: Not on file    Food insecurity:     Worry: Not on file     Inability: Not on file    Transportation needs:     Medical: Not on file     Non-medical: Not on file   Tobacco Use    Smoking status: Never Smoker    Smokeless tobacco: Never Used   Substance and Sexual Activity    Alcohol use: Yes     Comment: socially    Drug use: No    Sexual activity: Yes     Partners: Female     Birth control/protection: Condom   Lifestyle    Physical activity:     Days per week: Not on file     Minutes per session: Not on file    Stress: Not on file   Relationships    Social connections:     Talks on phone: Not on file     Gets together: Not on file     Attends Yarsanism service: Not on file     Active member of club or organization: Not on file     Attends meetings of clubs or organizations: Not on file     Relationship status: Not on file    Intimate partner violence:     Fear of current or ex partner: Not on file     Emotionally abused: Not on file     Physically abused: Not on file     Forced sexual activity: Not on file   Other Topics Concern    Not on file   Social History Narrative    Not on file      No Known Allergies   Current Outpatient Medications   Medication Sig    lisinopril (PRINIVIL, ZESTRIL) 20 mg tablet Take 1 Tab by mouth daily.  cpap machine kit by Does Not Apply route.  indomethacin (INDOCIN) 50 mg capsule Take 1 Cap by mouth three (3) times daily for 90 days.  tadalafil (CIALIS) 10 mg tablet Take 1 tablet 30 minutes prior to anticipated sexual activity as one single dose and not more than once daily.  allopurinol (ZYLOPRIM) 100 mg tablet Take 2 Tabs by mouth daily.  indomethacin (INDOCIN) 25 mg capsule Take 2 capsules by mouth, three times a day x 3 days, then take 1 capsule by mouth daily x 10 days    allopurinol (ZYLOPRIM) 100 mg tablet Take 1 Tab by mouth daily.  colchicine 0.6 mg tablet Take 1 tablet by mouth Q1Hour for gout pain. NOT TO EXCEED 3 TABLETS IN 24 HOURS. No current facility-administered medications for this visit. PHYSICAL EXAMINATION:  Visit Vitals  BP (!) 188/107   Pulse 66   Temp 96.8 °F (36 °C) (Oral)   Resp 18   Ht 6' 1\" (1.854 m)   Wt 295 lb 12.8 oz (134.2 kg)   SpO2 97%   BMI 39.03 kg/m²      ORTHO EXAMINATION:  Examination Right knee Left knee   Skin Intact Intact   Range of motion 120-0 120-0   Effusion +++ to light touch -   Medial joint line tenderness + -   Lateral joint line tenderness - -   Popliteal tenderness - -   Osteophytes palpable + -   Yariels - -   Patella crepitus - -   Anterior drawer - -   Lateral laxity - -   Medial laxity - -   Varus deformity - -   Valgus deformity - -   Pretibial edema - -   Calf tenderness - -     CRYSTALS, SYNOVIAL FLUID 6/13/19 [facility]  Component Value Flag Ref Range Units Status   FLUID TYPE(7) ASPIRATE      Final   Crystals, body fluid      Final   URIC ACID CRYSTALS PRESENT    Comment:   RARE. FEW WBCs     CULTURE, BODY FLUID W GRAM STAIN 6/13/19 [facility]  Component Value Ref Range & Units Status   Special Requests: NO SPECIAL REQUESTS    Preliminary   GRAM STAIN FEW WBC'S    Preliminary   GRAM STAIN NO ORGANISMS SEEN    Preliminary   Culture result: NO GROWTH 4 DAYS    Preliminary     RADIOGRAPHS:  XR RIGHT KNEE 6/7/19 [facility]  IMPRESSION:  No evidence for acute fracture. Large joint effusion. Mild soft tissue swelling anteriorly  -I have independently reviewed these images during this office visit. -Dr. Montero Number:  Three views - No fractures, large effusion, mild joint space narrowing, no osteophytes present. Kellgren Peter grade 1. Condylar flattening    IMPRESSION:      ICD-10-CM ICD-9-CM    1. Acute gout of right knee, unspecified cause M10.9 274.01 betamethasone (CELESTONE SOLUSPAN) 6 mg/mL injection      BETAMETHASONE ACETATE & SODIUM PHOSPHATE INJECTION 3 MG EA.      DRAIN/INJECT LARGE JOINT/BURSA   2.  Effusion of bursa of right knee M25.461 719.06 betamethasone (CELESTONE SOLUSPAN) 6 mg/mL injection      BETAMETHASONE ACETATE & SODIUM PHOSPHATE INJECTION 3 MG EA.      DRAIN/INJECT LARGE JOINT/BURSA   3. Sprain of right knee, unspecified ligament, initial encounter S83. 91XA 844.9 betamethasone (CELESTONE SOLUSPAN) 6 mg/mL injection      BETAMETHASONE ACETATE & SODIUM PHOSPHATE INJECTION 3 MG EA.      DRAIN/INJECT LARGE JOINT/BURSA   4. Acute pain of right knee M25.561 719.46 betamethasone (CELESTONE SOLUSPAN) 6 mg/mL injection      BETAMETHASONE ACETATE & SODIUM PHOSPHATE INJECTION 3 MG EA.      DRAIN/INJECT LARGE JOINT/BURSA        PLAN:  After timeout and under sterile conditions, right knee reaspirated 55 cc of light yellow fluid. The fluid was discarded. After discussing treatment options, patient's right knee was injected with 4 cc Marcaine and 1/2 cc Celestone. There is no need for surgery at this time he was provided a note stating he was seen today. He will follow up as needed. He will discuss increasing his Allopurinol dose with his PCP.     Scribed by Kayla Carter ([facility]) as dictated by Sunny Lieberman MD

## 2019-06-26 ENCOUNTER — TELEPHONE (OUTPATIENT)
Dept: FAMILY MEDICINE CLINIC | Facility: CLINIC | Age: 39
End: 2019-06-26

## 2019-06-26 NOTE — TELEPHONE ENCOUNTER
Patient states she saw Dr. Flex Leach for fluid on her kneww. He has drawn fluid off twice the past 2 weeks. He advise her to ask Divina Rudd to order an MRI if problem persist.  She continues to have pain and swelling with fluid. Please advise.

## 2019-06-28 ENCOUNTER — TELEPHONE (OUTPATIENT)
Dept: ORTHOPEDIC SURGERY | Facility: CLINIC | Age: 39
End: 2019-06-28

## 2019-06-28 NOTE — TELEPHONE ENCOUNTER
Patient called for . Patient said that he is still having sharp pain on his Right Knee. Patient is asking if he can be set up to get an Mri Done at [facility]. Patient would like a call back at tel. [phone]. Note : patient last seen by Valeria Rivas for the Right knee on 06/24/2019.

## 2019-06-28 NOTE — TELEPHONE ENCOUNTER
Patient is still having pain in his right knee he has fluid drawn off and would like to have a MRI  Please advise . Patient was made aware  To call ortho doctor .

## 2019-07-11 DIAGNOSIS — I10 HYPERTENSION, UNSPECIFIED TYPE: ICD-10-CM

## 2019-07-11 DIAGNOSIS — Z87.39 HISTORY OF GOUT: ICD-10-CM

## 2019-07-11 DIAGNOSIS — M10.9 ACUTE GOUT OF RIGHT KNEE, UNSPECIFIED CAUSE: ICD-10-CM

## 2019-07-11 RX ORDER — INDOMETHACIN 25 MG/1
CAPSULE ORAL
Qty: 28 CAP | Refills: 0 | Status: SHIPPED | OUTPATIENT
Start: 2019-07-11 | End: 2019-08-09

## 2019-07-11 RX ORDER — LISINOPRIL 20 MG/1
20 TABLET ORAL DAILY
Qty: 30 TAB | Refills: 1 | Status: SHIPPED | OUTPATIENT
Start: 2019-07-11 | End: 2019-08-09 | Stop reason: SDUPTHER

## 2019-07-11 RX ORDER — ALLOPURINOL 100 MG/1
100 TABLET ORAL DAILY
Qty: 30 TAB | Refills: 1 | Status: SHIPPED | OUTPATIENT
Start: 2019-07-11 | End: 2019-08-09

## 2019-07-11 NOTE — TELEPHONE ENCOUNTER
Requested Prescriptions     Pending Prescriptions Disp Refills    allopurinol (ZYLOPRIM) 100 mg tablet 30 Tab 1     Sig: Take 1 Tab by mouth daily.  indomethacin (INDOCIN) 25 mg capsule 28 Cap 0     Sig: Take 2 capsules by mouth, three times a day x 3 days, then take 1 capsule by mouth daily x 10 days    lisinopril (PRINIVIL, ZESTRIL) 20 mg tablet 30 Tab 1     Sig: Take 1 Tab by mouth daily.

## 2019-07-17 ENCOUNTER — HOSPITAL ENCOUNTER (OUTPATIENT)
Dept: MRI IMAGING | Age: 39
Discharge: HOME | End: 2019-07-17
Attending: SPECIALIST
Payer: MEDICAID

## 2019-07-17 DIAGNOSIS — M25.561 ACUTE PAIN OF RIGHT KNEE: ICD-10-CM

## 2019-07-17 DIAGNOSIS — M25.461 EFFUSION OF BURSA OF RIGHT KNEE: ICD-10-CM

## 2019-07-17 DIAGNOSIS — S83.91XA SPRAIN OF RIGHT KNEE, UNSPECIFIED LIGAMENT, INITIAL ENCOUNTER: ICD-10-CM

## 2019-07-17 PROCEDURE — 73721 MRI JNT OF LWR EXTRE W/O DYE: CPT

## 2019-07-22 ENCOUNTER — OFFICE VISIT (OUTPATIENT)
Dept: ORTHOPEDIC SURGERY | Facility: CLINIC | Age: 39
End: 2019-07-22

## 2019-07-22 VITALS
OXYGEN SATURATION: 98 % | SYSTOLIC BLOOD PRESSURE: 149 MMHG | HEIGHT: 73 IN | HEART RATE: 88 BPM | TEMPERATURE: 98 F | DIASTOLIC BLOOD PRESSURE: 95 MMHG | RESPIRATION RATE: 18 BRPM | BODY MASS INDEX: 37.93 KG/M2 | WEIGHT: 286.2 LBS

## 2019-07-22 DIAGNOSIS — M10.9 GOUTY ARTHROPATHY: ICD-10-CM

## 2019-07-22 DIAGNOSIS — M25.561 ACUTE PAIN OF RIGHT KNEE: ICD-10-CM

## 2019-07-22 DIAGNOSIS — M25.461 EFFUSION OF BURSA OF RIGHT KNEE: ICD-10-CM

## 2019-07-22 DIAGNOSIS — M10.9 ACUTE GOUT OF RIGHT KNEE, UNSPECIFIED CAUSE: Primary | ICD-10-CM

## 2019-07-22 RX ORDER — BETAMETHASONE SODIUM PHOSPHATE AND BETAMETHASONE ACETATE 3; 3 MG/ML; MG/ML
6 INJECTION, SUSPENSION INTRA-ARTICULAR; INTRALESIONAL; INTRAMUSCULAR; SOFT TISSUE ONCE
Qty: 0.5 ML | Refills: 0
Start: 2019-07-22 | End: 2019-07-22

## 2019-07-22 RX ORDER — INDOMETHACIN 50 MG/1
50 CAPSULE ORAL 2 TIMES DAILY
Qty: 60 CAP | Refills: 2 | Status: SHIPPED | OUTPATIENT
Start: 2019-07-22 | End: 2019-10-20

## 2019-07-22 NOTE — PROGRESS NOTES
Patient: Leonidas Mariscal                MRN: [id number]       SSN: [id number]  YOB: 1980        AGE: 37 y.o. SEX: male    PCP: Dawit Kay NP  07/22/19    CC: RIGHT KNEE PAIN, SWELLING, AND MRI REVIEW    HISTORY:  Leonidas Mariscal is a 37 y.o. male who is seen for right knee pain. He responded nicely to his aspiration and injection at previous ov. Synovial fluid analysis was + for uric acid crystals and culture was negative. The Indocin helped. His pain and swelling returned a few days ago. He notes patellofemoral pain. He is tender even to light touch. He is unable to bend his right knee. He had to miss several days of work due to his knee pain - his brother in law stopped him from working. He felt a sharp pain and tightness in his right knee this morning. He had been been experiencing knee pain for about 1 week. He tripped going up stairs in his house. His right knee gave out on him. He developed right knee pain and swelling for 2 days following the injury. He has a h/o gout. He kneels to buff floors at work. He had DCed Indocin last OV and started to take Ibuprofen. The Ibuprofen had not helped him much so he had taken a few doses of Indocin again. He had been seen by his PCP who provided him an Rx for Allopurinol. 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 He lives in [place] [facility]. He has 8 children  21, 24, 16, 15, 8, 5, 2 yo daughters and one 12 yo son.  He recently lost 10 pounds. He knows Enriqueta Mcrae family members and met her at a wedding once - but she was very snobby. Mr. Ba Vasquez weighs 296 lbs and is 6'1\" tall. Lab Results   Component Value Date/Time    Hemoglobin A1c 5.5 05/29/2019 03:08 PM     Weight Metrics 7/22/2019 6/24/2019 6/17/2019 6/13/2019 6/8/2019 6/7/2019 5/16/2019   Weight 286 lb 3.2 oz 295 lb 12.8 oz 296 lb 3.2 oz 296 lb 6.4 oz 296 lb 296 lb 301 lb 3.2 oz   BMI 37.76 kg/m2 39.03 kg/m2 39.08 kg/m2 39.11 kg/m2 40.14 kg/m2 40.14 kg/m2 40.85 kg/m2       Patient Active Problem List   Diagnosis Code    Acute pain of right knee M25.561    Swelling of knee joint M25.469    Essential hypertension I10    Hypertension I10    Renal insufficiency N28.9    Medical non-compliance Z91.19    Hyperlipidemia E78.5    Right knee pain M25.561    Elevated uric acid in blood E79.0    Obesity, morbid (HCC) E66.01     REVIEW OF SYSTEMS: All Below are Negative except: See HPI   Constitutional: negative for fever, chills, and weight loss. Cardiovascular: negative for chest pain, claudication, leg swelling, SOB, LEO   Gastrointestinal: Negative for pain, N/V/C/D, Blood in stool or urine, dysuria, hematuria, incontinence, pelvic pain. Musculoskeletal: See HPI   Neurological: Negative for dizziness and weakness. Negative for headaches, Visual changes, confusion, seizures   Phychiatric/Behavioral: Negative for depression, memory loss, substance abuse. Extremities: Negative for hair changes, rash, or skin lesion changes. Hematologic: Negative for bleeding problems, bruising, pallor or swollen lymph nodes   Peripheral Vascular: No calf pain, no circulation deficits.     Social History     Socioeconomic History    Marital status: UNKNOWN     Spouse name: Not on file    Number of children: Not on file    Years of education: Not on file    Highest education level: Not on file   Occupational History    Not on file   Social Needs    Financial resource strain: Not on file    Food insecurity:     Worry: Not on file     Inability: Not on file    Transportation needs:     Medical: Not on file     Non-medical: Not on file   Tobacco Use    Smoking status: Never Smoker    Smokeless tobacco: Never Used   Substance and Sexual Activity    Alcohol use: Yes     Comment: socially    Drug use: No    Sexual activity: Yes     Partners: Female     Birth control/protection: Condom   Lifestyle    Physical activity:     Days per week: Not on file     Minutes per session: Not on file    Stress: Not on file   Relationships    Social connections:     Talks on phone: Not on file     Gets together: Not on file     Attends Mandaeism service: Not on file     Active member of club or organization: Not on file     Attends meetings of clubs or organizations: Not on file     Relationship status: Not on file    Intimate partner violence:     Fear of current or ex partner: Not on file     Emotionally abused: Not on file     Physically abused: Not on file     Forced sexual activity: Not on file   Other Topics Concern    Not on file   Social History Narrative    Not on file      No Known Allergies   Current Outpatient Medications   Medication Sig    allopurinol (ZYLOPRIM) 100 mg tablet Take 2 Tabs by mouth daily.  allopurinol (ZYLOPRIM) 100 mg tablet Take 1 Tab by mouth daily.  indomethacin (INDOCIN) 25 mg capsule Take 2 capsules by mouth, three times a day x 3 days, then take 1 capsule by mouth daily x 10 days    lisinopril (PRINIVIL, ZESTRIL) 20 mg tablet Take 1 Tab by mouth daily.  cpap machine kit by Does Not Apply route.  indomethacin (INDOCIN) 50 mg capsule Take 1 Cap by mouth three (3) times daily for 90 days.  tadalafil (CIALIS) 10 mg tablet Take 1 tablet 30 minutes prior to anticipated sexual activity as one single dose and not more than once daily.  colchicine 0.6 mg tablet Take 1 tablet by mouth Q1Hour for gout pain.     NOT TO EXCEED 3 TABLETS IN 24 HOURS. No current facility-administered medications for this visit. PHYSICAL EXAMINATION:  Visit Vitals  BP (!) 149/95   Pulse 88   Temp 98 °F (36.7 °C) (Oral)   Resp 18   Ht 6' 1\" (1.854 m)   Wt 286 lb 3.2 oz (129.8 kg)   SpO2 98%   BMI 37.76 kg/m²      ORTHO EXAMINATION:  Examination Right knee Left knee   Skin Intact Intact   Range of motion 20-5 120-0   Effusion +++ to light touch -   Medial joint line tenderness + -   Lateral joint line tenderness - -   Popliteal tenderness - -   Osteophytes palpable + -   Yariels - -   Patella crepitus - -   Anterior drawer - -   Lateral laxity - -   Medial laxity - -   Varus deformity - -   Valgus deformity - -   Pretibial edema - -   Calf tenderness - -     TIME OUT:  Chart reviewed for the following:   I, Crispin Moyer MD, have reviewed the History, Physical and updated the Allergic reactions for [facility] performed immediately prior to start of procedure:  Delilah Gruber MD, have performed the following reviews on [address] [place] [address] prior to the start of the procedure:          * Patient was identified by name and date of birth   * Agreement on procedure being performed was verified  * Risks and Benefits explained to the patient  * Procedure site verified and marked as necessary  * Patient was positioned for comfort  * Consent was obtained     Time: 3:18 PM     Date of procedure: 7/22/2019  Procedure performed by:  Crispin Moyer MD  Mr. Chinedu Walker tolerated the procedure well with no complications. CRYSTALS, SYNOVIAL FLUID 6/13/19 [facility]  Component Value Flag Ref Range Units Status   FLUID TYPE(7) ASPIRATE      Final   Crystals, body fluid      Final   URIC ACID CRYSTALS PRESENT    Comment:   RARE.  FEW WBCs     CULTURE, BODY FLUID W GRAM STAIN 6/13/19 [facility]  Component Value Ref Range & Units Status   Special Requests: NO SPECIAL REQUESTS    Preliminary   GRAM STAIN FEW WBC'S    Preliminary   GRAM STAIN NO ORGANISMS SEEN    Preliminary   Culture result: NO GROWTH 4 DAYS    Preliminary     MRI RIGHT KNEE WO CONT 7[facility]17/19 [facility]  IMPRESSION:  1. Large right knee joint effusion and extensive synovial thickening/synovitis  throughout the knee. -High-grade chondral loss in the patella. Small areas of erosions and large area  of bone marrow edema in the lateral femoral condyle and patella. -Thickening and signal abnormality of the intra-articular popliteal tendon.  -Constellation of findings suggestive of gout arthropathy in this patient with  history of gout. Differential also include other inflammatory arthropathy,  pseudogout and less likely infectious arthropathy/septic joint although cannot  be entirely excluded based on imaging. Recommend clinical correlation. -According to last office note 6/24/2019 joint aspiration was yellow fluid  nonpurulent. 2. No ligamentous injury. No meniscal tears. 3. Prepatellar bursitis. RADIOGRAPHS:  XR RIGHT KNEE 6/7/19 [facility]  IMPRESSION:  No evidence for acute fracture. Large joint effusion. Mild soft tissue swelling anteriorly  -I have independently reviewed these images during this office visit. -Dr. Mukesh Hanson:  Three views - No fractures, large effusion, mild joint space narrowing, no osteophytes present. Kellgren Peter grade 1. Condylar flattening    IMPRESSION:      ICD-10-CM ICD-9-CM    1. Acute gout of right knee, unspecified cause M10.9 274.01 betamethasone (CELESTONE SOLUSPAN) 6 mg/mL injection      BETAMETHASONE ACETATE & SODIUM PHOSPHATE INJECTION 3 MG EA.      DRAIN/INJECT LARGE JOINT/BURSA      indomethacin (INDOCIN) 50 mg capsule   2. Effusion of bursa of right knee M25.461 719.06 betamethasone (CELESTONE SOLUSPAN) 6 mg/mL injection      BETAMETHASONE ACETATE & SODIUM PHOSPHATE INJECTION 3 MG EA.      DRAIN/INJECT LARGE JOINT/BURSA   3.  Acute pain of right knee M25.561 719.46 betamethasone (CELESTONE SOLUSPAN) 6 mg/mL injection      BETAMETHASONE ACETATE & SODIUM PHOSPHATE INJECTION 3 MG EA.      DRAIN/INJECT LARGE JOINT/BURSA   4. Gouty arthropathy M10.9 274.00         PLAN:  After timeout and under sterile conditions, right knee reaspirated 15 cc of clear light yellow fluid. The fluid was discarded. After discussing treatment options, patient's right knee was reinjected with 4 cc Marcaine and 1/2 cc Celestone. Indocin Rx provided. There is no need for surgery at this time he was provided a note stating he was seen today. He will follow up as needed. He will discuss increasing his allopurinol dose with his PCP.     Scribed by Grace Keenan ([address]) as dictated by Edson Sue MD

## 2019-08-09 ENCOUNTER — OFFICE VISIT (OUTPATIENT)
Dept: FAMILY MEDICINE CLINIC | Facility: CLINIC | Age: 39
End: 2019-08-09

## 2019-08-09 VITALS
HEART RATE: 58 BPM | WEIGHT: 292 LBS | SYSTOLIC BLOOD PRESSURE: 158 MMHG | DIASTOLIC BLOOD PRESSURE: 92 MMHG | TEMPERATURE: 97.2 F | BODY MASS INDEX: 38.7 KG/M2 | RESPIRATION RATE: 14 BRPM | OXYGEN SATURATION: 94 % | HEIGHT: 73 IN

## 2019-08-09 DIAGNOSIS — I10 ESSENTIAL HYPERTENSION: Primary | ICD-10-CM

## 2019-08-09 DIAGNOSIS — G47.30 SLEEP APNEA, UNSPECIFIED TYPE: ICD-10-CM

## 2019-08-09 RX ORDER — LISINOPRIL 20 MG/1
20 TABLET ORAL DAILY
Qty: 30 TAB | Refills: 1 | Status: SHIPPED | OUTPATIENT
Start: 2019-08-09 | End: 2019-09-10 | Stop reason: SDUPTHER

## 2019-08-09 RX ORDER — AMLODIPINE BESYLATE 5 MG/1
5 TABLET ORAL DAILY
Qty: 30 TAB | Refills: 1 | Status: SHIPPED | OUTPATIENT
Start: 2019-08-09 | End: 2019-09-10 | Stop reason: SDUPTHER

## 2019-08-09 NOTE — PROGRESS NOTES
Concepcion Harrison presents today for   Chief Complaint   Patient presents with    Well Male     F/U       Concepcion Harrison preferred language for health care discussion is english. Is someone accompanying this pt? no    Is the patient using any DME equipment during [address]? no    Depression Screening:  3 most recent PHQ Screens 8/9/2019   PHQ Not Done -   Little interest or pleasure in doing things Not at all   Feeling down, depressed, irritable, or hopeless Not at all   Total Score PHQ 2 0       Learning Assessment:  No flowsheet data found. Abuse Screening:  No flowsheet data found. Health Maintenance reviewed and discussed and ordered per Provider. Health Maintenance Due   Topic Date Due    Influenza Age 5 to Adult  08/01/2019   . Concepcion Harrison is updated on all     Pt currently taking Antiplatelet therapy? no    Coordination of Care:  1. Have you been to the ER, urgent care clinic since your last visit? no  Hospitalized since your last visit? no    2. Have you seen or consulted any other health care providers outside of the [address] [place] [address] since your last visit? no Include any pap smears or colon screening.  no

## 2019-08-09 NOTE — PROGRESS NOTES
Lucius Camacho is a 37 y.o. male presenting today for Well Male (F/U)    HPI:  Lucius Camacho presents to the office today for hypertension follow-up care. Patient presents today with elevated blood pressure 158/92. Patient is currently taking lisinopril 20 mg daily. Patient was in the practice x2 months ago and his blood pressure was elevated. Patient was also complaining of gout. Per the office note prior to believe that the elevated blood pressure was associated with he presents today noting. He notes he is compliant with taking his medication daily. Patient also has a history of severe sleep apnea and notes that he started using his CPAP machine about 2 to 3 weeks ago. He is currently using his CPAP about 3 to 4 hours per night. He notes that he continues to have daytime sleepiness. He is negative for any chest pain, palpitation or lower extremity edema. He denies any cough, wheezing or congestion. Review of Systems   Constitutional: Positive for malaise/fatigue (severe sleep apnea). Chills: patient has severe sleep apnea. Respiratory: Negative for cough and shortness of breath. Cardiovascular: Negative for chest pain and palpitations. Neurological: Negative for dizziness and headaches. No Known Allergies    Current Outpatient Medications   Medication Sig Dispense Refill    amLODIPine (NORVASC) 5 mg tablet Take 1 Tab by mouth daily. 30 Tab 1    lisinopril (PRINIVIL, ZESTRIL) 20 mg tablet Take 1 Tab by mouth daily. 30 Tab 1    indomethacin (INDOCIN) 50 mg capsule Take 1 Cap by mouth two (2) times a day for 90 days. 60 Cap 2    cpap machine kit by Does Not Apply route. 1 Kit 0    allopurinol (ZYLOPRIM) 100 mg tablet Take 2 Tabs by mouth daily.  61 Tab 3       Past Medical History:   Diagnosis Date    Elevated uric acid in blood 03/05/2015    8.6 mg/dL    Gout     Hyperlipidemia     Hypertension     Medical non-compliance     Renal insufficiency 04/06/2011 BUN/sCr: 14/1.4, GFR >60    Right knee pain 09/20/2010    Plain Film NEG    Right knee pain 04/10/2015    Plain Film NEG       Past Surgical History:   Procedure Laterality Date    HX CYST REMOVAL      HX TONSILLECTOMY         Social History     Socioeconomic History    Marital status: UNKNOWN     Spouse name: Not on file    Number of children: Not on file    Years of education: Not on file    Highest education level: Not on file   Occupational History    Not on file   Social Needs    Financial resource strain: Not on file    Food insecurity:     Worry: Not on file     Inability: Not on file    Transportation needs:     Medical: Not on file     Non-medical: Not on file   Tobacco Use    Smoking status: Never Smoker    Smokeless tobacco: Never Used   Substance and Sexual Activity    Alcohol use: Yes     Comment: socially    Drug use: No    Sexual activity: Yes     Partners: Female     Birth control/protection: Condom   Lifestyle    Physical activity:     Days per week: Not on file     Minutes per session: Not on file    Stress: Not on file   Relationships    Social connections:     Talks on phone: Not on file     Gets together: Not on file     Attends Yarsanism service: Not on file     Active member of club or organization: Not on file     Attends meetings of clubs or organizations: Not on file     Relationship status: Not on file    Intimate partner violence:     Fear of current or ex partner: Not on file     Emotionally abused: Not on file     Physically abused: Not on file     Forced sexual activity: Not on file   Other Topics Concern    Not on file   Social History Narrative    Not on file       Patient does not have an advanced directive on file    Vitals:    08/09/19 0719   BP: (!) 158/92   Pulse: (!) 58   Resp: 14   Temp: 97.2 °F (36.2 °C)   TempSrc: Tympanic   SpO2: 94%   Weight: 292 lb (132.5 kg)   Height: 6' 1\" (1.854 m)   PainSc:   0 - No pain       Physical Exam   Constitutional: No distress. Cardiovascular: Normal rate, regular rhythm and normal heart sounds. Pulmonary/Chest: Effort normal and breath sounds normal.   Neurological: He is alert. Nursing note and vitals reviewed. Hospital Outpatient Visit on 06/13/2019   Component Date Value Ref Range Status    FLUID TYPE(7) 06/13/2019 ASPIRATE    Final    Crystals, body fluid 06/13/2019 URIC ACID CRYSTALS PRESENT    Final    RARE. FEW WBCs    Special Requests: 06/13/2019 NO SPECIAL REQUESTS    Final    GRAM STAIN 06/13/2019 FEW WBC'S    Final    GRAM STAIN 06/13/2019 NO ORGANISMS SEEN    Final    Culture result: 06/13/2019 NO GROWTH 5 DAYS    Final   Hospital Outpatient Visit on 05/29/2019   Component Date Value Ref Range Status    WBC 05/29/2019 4.8  4.6 - 13.2 K/uL Final    RBC 05/29/2019 4.73  4.70 - 5.50 M/uL Final    HGB 05/29/2019 13.1  13.0 - 16.0 g/dL Final    HCT 05/29/2019 40.0  36.0 - 48.0 % Final    MCV 05/29/2019 84.6  74.0 - 97.0 FL Final    MCH 05/29/2019 27.7  24.0 - 34.0 PG Final    MCHC 05/29/2019 32.8  31.0 - 37.0 g/dL Final    RDW 05/29/2019 13.7  11.6 - 14.5 % Final    PLATELET 14/50/9347 424  135 - 420 K/uL Final    MPV 05/29/2019 9.9  9.2 - 11.8 FL Final    NEUTROPHILS 05/29/2019 39* 40 - 73 % Final    LYMPHOCYTES 05/29/2019 40  21 - 52 % Final    MONOCYTES 05/29/2019 14* 3 - 10 % Final    EOSINOPHILS 05/29/2019 7* 0 - 5 % Final    BASOPHILS 05/29/2019 0  0 - 2 % Final    ABS. NEUTROPHILS 05/29/2019 1.9  1.8 - 8.0 K/UL Final    ABS. LYMPHOCYTES 05/29/2019 1.9  0.9 - 3.6 K/UL Final    ABS. MONOCYTES 05/29/2019 0.7  0.05 - 1.2 K/UL Final    ABS. EOSINOPHILS 05/29/2019 0.3  0.0 - 0.4 K/UL Final    ABS.  BASOPHILS 05/29/2019 0.0  0.0 - 0.1 K/UL Final    DF 05/29/2019 AUTOMATED    Final    LIPID PROFILE 05/29/2019        Final    Cholesterol, total 05/29/2019 165  <200 MG/DL Final    Triglyceride 05/29/2019 193* <150 MG/DL Final    Comment: The drugs N-acetylcysteine (NAC) and  Metamiszole have been found to cause falsely  low results in this chemical assay. Please  be sure to submit blood samples obtained  BEFORE administration of either of these  drugs to assure correct results.  HDL Cholesterol 05/29/2019 37* 40 - 60 MG/DL Final    LDL, calculated 05/29/2019 89.4  0 - 100 MG/DL Final    VLDL, calculated 05/29/2019 38.6  MG/DL Final    CHOL/HDL Ratio 05/29/2019 4.5  0 - 5.0   Final    Hemoglobin A1c 05/29/2019 5.5  4.2 - 5.6 % Final    Comment: (NOTE)  HbA1C Interpretive Ranges  <5.7              Normal  5.7 - 6.4         Consider Prediabetes  >6.5              Consider Diabetes      Est. average glucose 05/29/2019 111  mg/dL Final    Comment: (NOTE)  The eAG should be interpreted with patient characteristics in mind   since ethnicity, interindividual differences, red cell lifespan,   variation in rates of glycation, etc. may affect the validity of the   calculation.  Sodium 05/29/2019 140  136 - 145 mmol/L Final    Potassium 05/29/2019 3.9  3.5 - 5.5 mmol/L Final    Chloride 05/29/2019 107  100 - 108 mmol/L Final    CO2 05/29/2019 24  21 - 32 mmol/L Final    Anion gap 05/29/2019 9  3.0 - 18 mmol/L Final    Glucose 05/29/2019 79  74 - 99 mg/dL Final    BUN 05/29/2019 11  7.0 - 18 MG/DL Final    Creatinine 05/29/2019 1.26  0.6 - 1.3 MG/DL Final    BUN/Creatinine ratio 05/29/2019 9* 12 - 20   Final    GFR est AA 05/29/2019 >60  >60 ml/min/1.73m2 Final    GFR est non-AA 05/29/2019 >60  >60 ml/min/1.73m2 Final    Comment: (NOTE)  Estimated GFR is calculated using the Modification of Diet in Renal   Disease (MDRD) Study equation, reported for both  Americans   (GFRAA) and non- Americans (GFRNA), and normalized to 1.73m2   body surface area. The physician must decide which value applies to   the patient. The MDRD study equation should only be used in   individuals age 25 or older.  It has not been validated for the   following: pregnant women, patients with serious comorbid conditions,   or on certain medications, or persons with extremes of body size,   muscle mass, or nutritional status.  Calcium 05/29/2019 8.8  8.5 - 10.1 MG/DL Final    Bilirubin, total 05/29/2019 0.5  0.2 - 1.0 MG/DL Final    ALT (SGPT) 05/29/2019 39  16 - 61 U/L Final    AST (SGOT) 05/29/2019 37  15 - 37 U/L Final    Alk. phosphatase 05/29/2019 84  45 - 117 U/L Final    Protein, total 05/29/2019 8.4* 6.4 - 8.2 g/dL Final    Albumin 05/29/2019 3.9  3.4 - 5.0 g/dL Final    Globulin 05/29/2019 4.5* 2.0 - 4.0 g/dL Final    A-G Ratio 05/29/2019 0.9  0.8 - 1.7   Final    TSH 05/29/2019 2.02  0.36 - 3.74 uIU/mL Final   Hospital Outpatient Visit on 05/16/2019   Component Date Value Ref Range Status    Uric acid 05/16/2019 8.1* 2.6 - 7.2 MG/DL Final    Comment: The drugs N-acetylcysteine (NAC) and  Metamiszole have been found to cause falsely  low results in this chemical assay. Please  be sure to submit blood samples obtained  BEFORE administration of either of these  drugs to assure correct results. .No results found for any visits on 08/09/19. Assessment / Plan:      ICD-10-CM ICD-9-CM    1. Essential hypertension I10 401.9 amLODIPine (NORVASC) 5 mg tablet      lisinopril (PRINIVIL, ZESTRIL) 20 mg tablet   2. Sleep apnea, unspecified type G47.30 780.57      Hypertension-continue lisinopril 20 mg daily. Added amlodipine 5 mg tablet  Sleep apnea-patient has a scheduled appointment with the neurologist  Follow-up in 3 months    I asked the patient if he  had any questions and answered his  questions. The patient stated that he understands the treatment plan and agrees with the treatment plan    This document was created with a voice activated dictation system and may contain transcription errors. Discussed the patient's BMI with him.   The BMI follow up plan is as follows:     dietary management education, guidance, and counseling  encourage exercise  monitor weight  prescribed dietary intake    An After Visit Summary was printed and given to the patient.

## 2019-08-09 NOTE — PATIENT INSTRUCTIONS
Body Mass Index: Care Instructions  Your Care Instructions    Body mass index (BMI) can help you see if your weight is raising your risk for health problems. It uses a formula to compare how much you weigh with how tall you are. · A BMI lower than 18.5 is considered underweight. · A BMI between 18.5 and 24.9 is considered healthy. · A BMI between 25 and 29.9 is considered overweight. A BMI of 30 or higher is considered obese. If your BMI is in the normal range, it means that you have a lower risk for weight-related health problems. If your BMI is in the overweight or obese range, you may be at increased risk for weight-related health problems, such as high blood pressure, heart disease, stroke, arthritis or joint pain, and diabetes. If your BMI is in the underweight range, you may be at increased risk for health problems such as fatigue, lower protection (immunity) against illness, muscle loss, bone loss, hair loss, and hormone problems. BMI is just one measure of your risk for weight-related health problems. You may be at higher risk for health problems if you are not active, you eat an unhealthy diet, or you drink too much alcohol or use tobacco products. Follow-up care is a key part of your treatment and safety. Be sure to make and go to all appointments, and call your doctor if you are having problems. It's also a good idea to know your test results and keep a list of the medicines you take. How can you care for yourself at home? · Practice healthy eating habits. This includes eating plenty of fruits, vegetables, whole grains, lean protein, and low-fat dairy. · If your doctor recommends it, get more exercise. Walking is a good choice. Bit by bit, increase the amount you walk every day. Try for at least 30 minutes on most days of the week. · Do not smoke. Smoking can increase your risk for health problems. If you need help quitting, talk to your doctor about stop-smoking programs and medicines. These can increase your chances of quitting for good. · Limit alcohol to 2 drinks a day for men and 1 drink a day for women. Too much alcohol can cause health problems. If you have a BMI higher than 25  · Your doctor may do other tests to check your risk for weight-related health problems. This may include measuring the distance around your waist. A waist measurement of more than 40 inches in men or 35 inches in women can increase the risk of weight-related health problems. · Talk with your doctor about steps you can take to stay healthy or improve your health. You may need to make lifestyle changes to lose weight and stay healthy, such as changing your diet and getting regular exercise. If you have a BMI lower than 18.5  · Your doctor may do other tests to check your risk for health problems. · Talk with your doctor about steps you can take to stay healthy or improve your health. You may need to make lifestyle changes to gain or maintain weight and stay healthy, such as getting more healthy foods in your diet and doing exercises to build muscle. Where can you learn more? Go to http://elli-vani.info/. Enter S176 in the search box to learn more about \"Body Mass Index: Care Instructions. \"  Current as of: October 13, 2016  Content Version: 11.4  © 5418-1355 [employer]. Care instructions adapted under license by [employer] (which disclaims liability or warranty for this information). If you have questions about a medical condition or this instruction, always ask your healthcare professional. [address] any warranty or liability for your use of this information.

## 2019-08-15 DIAGNOSIS — Z87.39 HISTORY OF GOUT: ICD-10-CM

## 2019-08-15 RX ORDER — ALLOPURINOL 100 MG/1
200 TABLET ORAL DAILY
Qty: 60 TAB | Refills: 3 | Status: SHIPPED | OUTPATIENT
Start: 2019-08-15 | End: 2019-09-10 | Stop reason: SDUPTHER

## 2019-08-15 NOTE — TELEPHONE ENCOUNTER
Requested Prescriptions     Pending Prescriptions Disp Refills    allopurinol (ZYLOPRIM) 100 mg tablet 60 Tab 3     Sig: Take 2 Tabs by mouth daily.

## 2019-09-10 ENCOUNTER — OFFICE VISIT (OUTPATIENT)
Dept: FAMILY MEDICINE CLINIC | Facility: CLINIC | Age: 39
End: 2019-09-10

## 2019-09-10 VITALS
RESPIRATION RATE: 12 BRPM | SYSTOLIC BLOOD PRESSURE: 145 MMHG | TEMPERATURE: 97 F | HEART RATE: 57 BPM | WEIGHT: 290.2 LBS | BODY MASS INDEX: 38.46 KG/M2 | DIASTOLIC BLOOD PRESSURE: 92 MMHG | OXYGEN SATURATION: 97 % | HEIGHT: 73 IN

## 2019-09-10 DIAGNOSIS — M10.9 ACUTE GOUT OF RIGHT KNEE, UNSPECIFIED CAUSE: ICD-10-CM

## 2019-09-10 DIAGNOSIS — I10 ESSENTIAL HYPERTENSION: ICD-10-CM

## 2019-09-10 DIAGNOSIS — Z87.39 HISTORY OF GOUT: ICD-10-CM

## 2019-09-10 RX ORDER — LISINOPRIL 20 MG/1
20 TABLET ORAL DAILY
Qty: 30 TAB | Refills: 1 | Status: SHIPPED | OUTPATIENT
Start: 2019-09-10 | End: 2019-10-09 | Stop reason: SDUPTHER

## 2019-09-10 RX ORDER — INDOMETHACIN 50 MG/1
50 CAPSULE ORAL 2 TIMES DAILY
Qty: 60 CAP | Refills: 2 | Status: CANCELLED | OUTPATIENT
Start: 2019-09-10 | End: 2019-12-09

## 2019-09-10 RX ORDER — ALLOPURINOL 100 MG/1
300 TABLET ORAL DAILY
Qty: 90 TAB | Refills: 3 | Status: SHIPPED | OUTPATIENT
Start: 2019-09-10 | End: 2019-10-09 | Stop reason: SDUPTHER

## 2019-09-10 RX ORDER — AMLODIPINE BESYLATE 5 MG/1
5 TABLET ORAL DAILY
Qty: 30 TAB | Refills: 1 | Status: SHIPPED | OUTPATIENT
Start: 2019-09-10 | End: 2019-10-09 | Stop reason: SDUPTHER

## 2019-09-10 NOTE — PROGRESS NOTES
Micheal Cornejo presents today for   Chief Complaint   Patient presents with    Hypertension     follow-up       Is someone accompanying this pt? no    Is the patient using any DME equipment during OV? no    Depression Screening:  3 most recent PHQ Screens 9/10/2019   PHQ Not Done -   Little interest or pleasure in doing things Not at all   Feeling down, depressed, irritable, or hopeless Not at all   Total Score PHQ 2 0       Learning Assessment:  Learning Assessment 9/10/2019   PRIMARY LEARNER Patient   HIGHEST LEVEL OF EDUCATION - PRIMARY LEARNER  GRADUATED HIGH SCHOOL OR GED   BARRIERS PRIMARY LEARNER NONE   CO-LEARNER CAREGIVER No   PRIMARY LANGUAGE ENGLISH   LEARNER PREFERENCE PRIMARY DEMONSTRATION   ANSWERED BY patient   RELATIONSHIP SELF       Abuse Screening:  Abuse Screening Questionnaire 9/10/2019   Do you ever feel afraid of your partner? N   Are you in a relationship with someone who physically or mentally threatens you? N   Is it safe for you to go home? Y       Fall Risk  No flowsheet data found. Health Maintenance reviewed and discussed and ordered per Provider. Health Maintenance Due   Topic Date Due    Influenza Age 5 to Adult  08/01/2019           Coordination of Care:  1. Have you been to the ER, urgent care clinic since your last visit? Hospitalized since your last visit? no    2. Have you seen or consulted any other health care providers outside of the [address] [place] [address] since your last visit? Include any pap smears or colon screening.  no

## 2019-10-09 ENCOUNTER — OFFICE VISIT (OUTPATIENT)
Dept: FAMILY MEDICINE CLINIC | Facility: CLINIC | Age: 39
End: 2019-10-09

## 2019-10-09 VITALS
BODY MASS INDEX: 38.83 KG/M2 | TEMPERATURE: 97 F | OXYGEN SATURATION: 97 % | SYSTOLIC BLOOD PRESSURE: 138 MMHG | HEIGHT: 73 IN | WEIGHT: 293 LBS | DIASTOLIC BLOOD PRESSURE: 88 MMHG | HEART RATE: 69 BPM | RESPIRATION RATE: 12 BRPM

## 2019-10-09 DIAGNOSIS — Z87.39 HISTORY OF GOUT: ICD-10-CM

## 2019-10-09 DIAGNOSIS — N52.9 ERECTILE DYSFUNCTION, UNSPECIFIED ERECTILE DYSFUNCTION TYPE: Primary | ICD-10-CM

## 2019-10-09 DIAGNOSIS — I10 ESSENTIAL HYPERTENSION: ICD-10-CM

## 2019-10-09 RX ORDER — INDOMETHACIN 50 MG/1
50 CAPSULE ORAL 2 TIMES DAILY
Qty: 60 CAP | Refills: 2 | Status: CANCELLED | OUTPATIENT
Start: 2019-10-09 | End: 2020-01-07

## 2019-10-09 RX ORDER — LISINOPRIL 20 MG/1
20 TABLET ORAL DAILY
Qty: 90 TAB | Refills: 0 | Status: SHIPPED | OUTPATIENT
Start: 2019-10-09 | End: 2020-02-24 | Stop reason: SDUPTHER

## 2019-10-09 RX ORDER — AMLODIPINE BESYLATE 5 MG/1
5 TABLET ORAL DAILY
Qty: 90 TAB | Refills: 0 | Status: SHIPPED | OUTPATIENT
Start: 2019-10-09 | End: 2020-08-11 | Stop reason: SDUPTHER

## 2019-10-09 RX ORDER — SILDENAFIL 50 MG/1
TABLET, FILM COATED ORAL
Qty: 30 TAB | Refills: 0 | Status: SHIPPED | OUTPATIENT
Start: 2019-10-09 | End: 2020-01-09 | Stop reason: SDUPTHER

## 2019-10-09 RX ORDER — ALLOPURINOL 100 MG/1
300 TABLET ORAL DAILY
Qty: 270 TAB | Refills: 0 | Status: SHIPPED | OUTPATIENT
Start: 2019-10-09 | End: 2020-02-24 | Stop reason: SDUPTHER

## 2019-10-09 NOTE — PROGRESS NOTES
Visit Vitals  BP (!) 131/99   Pulse 69   Temp 97 °F (36.1 °C) (Oral)   Resp 12   Ht 6' 1\" (1.854 m)   Wt 293 lb (132.9 kg)   SpO2 97%   BMI 38.66 kg/m²     Naveed Merida presents today for   Chief Complaint   Patient presents with    Hypertension     follow-up       Is someone accompanying this pt? no    Is the patient using any DME equipment during OV? no    Depression Screening:  3 most recent PHQ Screens 10/9/2019   PHQ Not Done -   Little interest or pleasure in doing things Not at all   Feeling down, depressed, irritable, or hopeless Not at all   Total Score PHQ 2 0       Learning Assessment:  Learning Assessment 9/10/2019   PRIMARY LEARNER Patient   HIGHEST LEVEL OF EDUCATION - PRIMARY LEARNER  GRADUATED HIGH SCHOOL OR GED   BARRIERS PRIMARY LEARNER NONE   CO-LEARNER CAREGIVER No   PRIMARY LANGUAGE ENGLISH   LEARNER PREFERENCE PRIMARY DEMONSTRATION   ANSWERED BY patient   RELATIONSHIP SELF       Abuse Screening:  Abuse Screening Questionnaire 9/10/2019   Do you ever feel afraid of your partner? N   Are you in a relationship with someone who physically or mentally threatens you? N   Is it safe for you to go home? Y       Fall Risk  No flowsheet data found. Health Maintenance reviewed and discussed and ordered per Provider. Health Maintenance Due   Topic Date Due    Influenza Age 5 to Adult  08/01/2019           Coordination of Care:  1. Have you been to the ER, urgent care clinic since your last visit? Hospitalized since your last visit? no    2. Have you seen or consulted any other health care providers outside of the [address] [place] [address] since your last visit? Include any pap smears or colon screening.  no

## 2019-10-09 NOTE — PROGRESS NOTES
Gayl Cogan is a 37 y.o. male presenting today for Hypertension (follow-up)    HPI:  Gayl Cogan presents to the office today for hypertension follow-up. He presents today with a blood pressure better controlled at 138/88/  His previous pressures were elevated secondary to patient not taking his blood pressure medication. He went on his medicine approximately 2 weeks ago. He is negative for chest pain, palpitation or lower extremity edema. Review of Systems   Respiratory: Negative for cough, sputum production and shortness of breath. Cardiovascular: Negative for chest pain, palpitations and leg swelling. Neurological: Negative for dizziness. No Known Allergies    Current Outpatient Medications   Medication Sig Dispense Refill    amLODIPine (NORVASC) 5 mg tablet Take 1 Tab by mouth daily. 90 Tab 0    allopurinol (ZYLOPRIM) 100 mg tablet Take 3 Tabs by mouth daily. 270 Tab 0    lisinopril (PRINIVIL, ZESTRIL) 20 mg tablet Take 1 Tab by mouth daily. 90 Tab 0    sildenafil citrate (VIAGRA) 50 mg tablet One tablet once daily 1 hour (range: 30 minutes to 4 hours) before sexual activity as needed 30 Tab 0    cpap machine kit by Does Not Apply route.  1 Kit 0       Past Medical History:   Diagnosis Date    Elevated uric acid in blood 03/05/2015    8.6 mg/dL    Gout     Hyperlipidemia     Hypertension     Medical non-compliance     Renal insufficiency 04/06/2011    BUN/sCr: 14/1.4, GFR >60    Right knee pain 09/20/2010    Plain Film NEG    Right knee pain 04/10/2015    Plain Film NEG       Past Surgical History:   Procedure Laterality Date    HX CYST REMOVAL      HX TONSILLECTOMY         Social History     Socioeconomic History    Marital status: SINGLE     Spouse name: Not on file    Number of children: Not on file    Years of education: Not on file    Highest education level: Not on file   Occupational History    Not on file   Social Needs    Financial resource strain: Not on file    Food insecurity:     Worry: Not on file     Inability: Not on file    Transportation needs:     Medical: Not on file     Non-medical: Not on file   Tobacco Use    Smoking status: Never Smoker    Smokeless tobacco: Never Used   Substance and Sexual Activity    Alcohol use: Yes     Comment: socially    Drug use: No    Sexual activity: Yes     Partners: Female     Birth control/protection: Condom   Lifestyle    Physical activity:     Days per week: Not on file     Minutes per session: Not on file    Stress: Not on file   Relationships    Social connections:     Talks on phone: Not on file     Gets together: Not on file     Attends Mosque service: Not on file     Active member of club or organization: Not on file     Attends meetings of clubs or organizations: Not on file     Relationship status: Not on file    Intimate partner violence:     Fear of current or ex partner: Not on file     Emotionally abused: Not on file     Physically abused: Not on file     Forced sexual activity: Not on file   Other Topics Concern    Not on file   Social History Narrative    Not on file       Patient does not have an advanced directive on file    Vitals:    10/09/19 0706 10/09/19 0734   BP: (!) 131/99 138/88   Pulse: 69    Resp: 12    Temp: 97 °F (36.1 °C)    TempSrc: Oral    SpO2: 97%    Weight: 293 lb (132.9 kg)    Height: 6' 1\" (1.854 m)    PainSc:   0 - No pain        Physical Exam   Constitutional: No distress. Cardiovascular: Normal rate, regular rhythm and normal heart sounds. Pulmonary/Chest: Effort normal and breath sounds normal.   Abdominal: Bowel sounds are normal.   Skin: Skin is warm and dry. Nursing note and vitals reviewed. No visits with results within 3 Month(s) from this visit.    Latest known visit with results is:   Hospital Outpatient Visit on 06/13/2019   Component Date Value Ref Range Status    FLUID TYPE(7) 06/13/2019 ASPIRATE    Final    Crystals, body fluid 06/13/2019 URIC ACID CRYSTALS PRESENT    Final    RARE. FEW WBCs    Special Requests: 06/13/2019 NO SPECIAL REQUESTS    Final    GRAM STAIN 06/13/2019 FEW WBC'S    Final    GRAM STAIN 06/13/2019 NO ORGANISMS SEEN    Final    Culture result: 06/13/2019 NO GROWTH 5 DAYS    Final       .No results found for any visits on 10/09/19. Assessment / Plan:      ICD-10-CM ICD-9-CM    1. Erectile dysfunction, unspecified erectile dysfunction type N52.9 607.84 sildenafil citrate (VIAGRA) 50 mg tablet      REFERRAL TO UROLOGY   2. Essential hypertension I10 401.9 amLODIPine (NORVASC) 5 mg tablet      lisinopril (PRINIVIL, ZESTRIL) 20 mg tablet      CBC WITH AUTOMATED DIFF      LIPID PANEL      METABOLIC PANEL, COMPREHENSIVE   3. History of gout Z87.39 V12.29 allopurinol (ZYLOPRIM) 100 mg tablet     HTN- refilled amlodipine and Lisinopril  Fasting labs ordered  Refilled Allopurinol  Rx or Viagra given  Referral to urology for erectile dyfunction      Follow-up and Dispositions    · Return in about 3 months (around 1/9/2020), or if symptoms worsen or fail to improve. I asked the patient if he  had any questions and answered his  questions. The patient stated that he understands the treatment plan and agrees with the treatment plan    This document was created with a voice activated dictation system and may contain transcription errors.

## 2020-01-07 ENCOUNTER — HOSPITAL ENCOUNTER (OUTPATIENT)
Dept: LAB | Age: 40
Discharge: HOME | End: 2020-01-07
Payer: MEDICAID

## 2020-01-07 DIAGNOSIS — I10 ESSENTIAL HYPERTENSION: ICD-10-CM

## 2020-01-07 LAB
ALBUMIN SERPL-MCNC: 3.8 G/DL (ref 3.4–5)
ALBUMIN/GLOB SERPL: 0.9 {RATIO} (ref 0.8–1.7)
ALP SERPL-CCNC: 66 U/L (ref 45–117)
ALT SERPL-CCNC: 44 U/L (ref 16–61)
ANION GAP SERPL CALC-SCNC: 4 MMOL/L (ref 3–18)
AST SERPL-CCNC: 37 U/L (ref 10–38)
BASOPHILS # BLD: 0 K/UL (ref 0–0.1)
BASOPHILS NFR BLD: 0 % (ref 0–2)
BILIRUB SERPL-MCNC: 0.7 MG/DL (ref 0.2–1)
BUN SERPL-MCNC: 11 MG/DL (ref 7–18)
BUN/CREAT SERPL: 10 (ref 12–20)
CALCIUM SERPL-MCNC: 9.2 MG/DL (ref 8.5–10.1)
CHLORIDE SERPL-SCNC: 105 MMOL/L (ref 100–111)
CHOLEST SERPL-MCNC: 225 MG/DL
CO2 SERPL-SCNC: 29 MMOL/L (ref 21–32)
CREAT SERPL-MCNC: 1.14 MG/DL (ref 0.6–1.3)
DIFFERENTIAL METHOD BLD: ABNORMAL
EOSINOPHIL # BLD: 0.2 K/UL (ref 0–0.4)
EOSINOPHIL NFR BLD: 4 % (ref 0–5)
ERYTHROCYTE [DISTWIDTH] IN BLOOD BY AUTOMATED COUNT: 14 % (ref 11.6–14.5)
GLOBULIN SER CALC-MCNC: 4.4 G/DL (ref 2–4)
GLUCOSE SERPL-MCNC: 90 MG/DL (ref 74–99)
HCT VFR BLD AUTO: 44.6 % (ref 36–48)
HDLC SERPL-MCNC: 49 MG/DL (ref 40–60)
HDLC SERPL: 4.6 {RATIO} (ref 0–5)
HGB BLD-MCNC: 14.9 G/DL (ref 13–16)
LDLC SERPL CALC-MCNC: 145.4 MG/DL (ref 0–100)
LIPID PROFILE,FLP: ABNORMAL
LYMPHOCYTES # BLD: 1.7 K/UL (ref 0.9–3.6)
LYMPHOCYTES NFR BLD: 31 % (ref 21–52)
MCH RBC QN AUTO: 30.3 PG (ref 24–34)
MCHC RBC AUTO-ENTMCNC: 33.4 G/DL (ref 31–37)
MCV RBC AUTO: 90.7 FL (ref 74–97)
MONOCYTES # BLD: 0.7 K/UL (ref 0.05–1.2)
MONOCYTES NFR BLD: 12 % (ref 3–10)
NEUTS SEG # BLD: 2.9 K/UL (ref 1.8–8)
NEUTS SEG NFR BLD: 53 % (ref 40–73)
PLATELET # BLD AUTO: 249 K/UL (ref 135–420)
PMV BLD AUTO: 10.8 FL (ref 9.2–11.8)
POTASSIUM SERPL-SCNC: 4.3 MMOL/L (ref 3.5–5.5)
PROT SERPL-MCNC: 8.2 G/DL (ref 6.4–8.2)
RBC # BLD AUTO: 4.92 M/UL (ref 4.7–5.5)
SODIUM SERPL-SCNC: 138 MMOL/L (ref 136–145)
TRIGL SERPL-MCNC: 153 MG/DL (ref ?–150)
VLDLC SERPL CALC-MCNC: 30.6 MG/DL
WBC # BLD AUTO: 5.4 K/UL (ref 4.6–13.2)

## 2020-01-07 PROCEDURE — 36415 COLL VENOUS BLD VENIPUNCTURE: CPT

## 2020-01-07 PROCEDURE — 85025 COMPLETE CBC W/AUTO DIFF WBC: CPT

## 2020-01-07 PROCEDURE — 80061 LIPID PANEL: CPT

## 2020-01-07 PROCEDURE — 80053 COMPREHEN METABOLIC PANEL: CPT

## 2020-01-09 ENCOUNTER — OFFICE VISIT (OUTPATIENT)
Dept: FAMILY MEDICINE CLINIC | Facility: CLINIC | Age: 40
End: 2020-01-09

## 2020-01-09 VITALS
WEIGHT: 301 LBS | RESPIRATION RATE: 12 BRPM | OXYGEN SATURATION: 95 % | SYSTOLIC BLOOD PRESSURE: 132 MMHG | HEIGHT: 73 IN | HEART RATE: 71 BPM | DIASTOLIC BLOOD PRESSURE: 89 MMHG | BODY MASS INDEX: 39.89 KG/M2 | TEMPERATURE: 97.1 F

## 2020-01-09 DIAGNOSIS — E78.2 MIXED HYPERLIPIDEMIA: Primary | ICD-10-CM

## 2020-01-09 DIAGNOSIS — N52.9 ERECTILE DYSFUNCTION, UNSPECIFIED ERECTILE DYSFUNCTION TYPE: ICD-10-CM

## 2020-01-09 RX ORDER — SILDENAFIL 50 MG/1
TABLET, FILM COATED ORAL
Qty: 30 TAB | Refills: 2 | Status: SHIPPED | OUTPATIENT
Start: 2020-01-09 | End: 2020-04-27 | Stop reason: SDUPTHER

## 2020-01-09 RX ORDER — ATORVASTATIN CALCIUM 20 MG/1
20 TABLET, FILM COATED ORAL DAILY
Qty: 90 TAB | Refills: 1 | Status: SHIPPED | OUTPATIENT
Start: 2020-01-09 | End: 2020-02-24 | Stop reason: SDUPTHER

## 2020-01-09 NOTE — PROGRESS NOTES
Deny Madison is a 37 y.o. male presenting today for Hypertension and Results (lab)    HPI:  Deny Madison presents to the office today for hypertension follow-up care. Patient had labs prior to today's office visit and labs discussed with him. His blood pressure is controlled at 132/89 he is compliant with taking his medication daily. He is requesting medication refills today. He states he feels well and is negative for chest pain or palpitation. Review of Systems   Constitutional: Negative for malaise/fatigue. Respiratory: Negative for cough and sputum production. Cardiovascular: Negative for chest pain and palpitations. Gastrointestinal: Negative for abdominal pain, nausea and vomiting. Neurological: Negative for dizziness and headaches. No Known Allergies    Current Outpatient Medications   Medication Sig Dispense Refill    sildenafil citrate (VIAGRA) 50 mg tablet One tablet once daily 1 hour (range: 30 minutes to 4 hours) before sexual activity as needed 30 Tab 2    atorvastatin (LIPITOR) 20 mg tablet Take 1 Tab by mouth daily. 90 Tab 1    amLODIPine (NORVASC) 5 mg tablet Take 1 Tab by mouth daily. 90 Tab 0    allopurinol (ZYLOPRIM) 100 mg tablet Take 3 Tabs by mouth daily. 270 Tab 0    lisinopril (PRINIVIL, ZESTRIL) 20 mg tablet Take 1 Tab by mouth daily. 90 Tab 0    cpap machine kit by Does Not Apply route.  1 Kit 0       Past Medical History:   Diagnosis Date    Elevated uric acid in blood 03/05/2015    8.6 mg/dL    Gout     Hyperlipidemia     Hypertension     Medical non-compliance     Renal insufficiency 04/06/2011    BUN/sCr: 14/1.4, GFR >60    Right knee pain 09/20/2010    Plain Film NEG    Right knee pain 04/10/2015    Plain Film NEG       Past Surgical History:   Procedure Laterality Date    HX CYST REMOVAL      HX TONSILLECTOMY         Social History     Socioeconomic History    Marital status: SINGLE     Spouse name: Not on file    Number of children: Not on file    Years of education: Not on file    Highest education level: Not on file   Occupational History    Not on file   Social Needs    Financial resource strain: Not on file    Food insecurity:     Worry: Not on file     Inability: Not on file    Transportation needs:     Medical: Not on file     Non-medical: Not on file   Tobacco Use    Smoking status: Never Smoker    Smokeless tobacco: Never Used   Substance and Sexual Activity    Alcohol use: Yes     Comment: socially    Drug use: No    Sexual activity: Yes     Partners: Female     Birth control/protection: Condom   Lifestyle    Physical activity:     Days per week: Not on file     Minutes per session: Not on file    Stress: Not on file   Relationships    Social connections:     Talks on phone: Not on file     Gets together: Not on file     Attends [employer] service: Not on file     Active member of club or organization: Not on file     Attends meetings of clubs or organizations: Not on file     Relationship status: Not on file    Intimate partner violence:     Fear of current or ex partner: Not on file     Emotionally abused: Not on file     Physically abused: Not on file     Forced sexual activity: Not on file   Other Topics Concern    Not on file   Social History Narrative    Not on file       Patient does not have an advanced directive on file    Vitals:    01/09/20 0702   BP: 132/89   Pulse: 71   Resp: 12   Temp: 97.1 °F (36.2 °C)   TempSrc: Oral   SpO2: 95%   Weight: 301 lb (136.5 kg)   Height: 6' 1\" (1.854 m)   PainSc:   0 - No pain       Physical Exam  Vitals signs and nursing note reviewed. Cardiovascular:      Rate and Rhythm: Normal rate and regular rhythm. Pulses: Normal pulses. Heart sounds: Normal heart sounds. Pulmonary:      Effort: Pulmonary effort is normal.      Breath sounds: Normal breath sounds. Skin:     General: Skin is warm and dry. Neurological:      Mental Status: He is alert. Hospital Outpatient Visit on 01/07/2020   Component Date Value Ref Range Status    WBC 01/07/2020 5.4  4.6 - 13.2 K/uL Final    RBC 01/07/2020 4.92  4.70 - 5.50 M/uL Final    HGB 01/07/2020 14.9  13.0 - 16.0 g/dL Final    HCT 01/07/2020 44.6  36.0 - 48.0 % Final    MCV 01/07/2020 90.7  74.0 - 97.0 FL Final    MCH 01/07/2020 30.3  24.0 - 34.0 PG Final    MCHC 01/07/2020 33.4  31.0 - 37.0 g/dL Final    RDW 01/07/2020 14.0  11.6 - 14.5 % Final    PLATELET 20/98/3443 083  135 - 420 K/uL Final    MPV 01/07/2020 10.8  9.2 - 11.8 FL Final    NEUTROPHILS 01/07/2020 53  40 - 73 % Final    LYMPHOCYTES 01/07/2020 31  21 - 52 % Final    MONOCYTES 01/07/2020 12* 3 - 10 % Final    EOSINOPHILS 01/07/2020 4  0 - 5 % Final    BASOPHILS 01/07/2020 0  0 - 2 % Final    ABS. NEUTROPHILS 01/07/2020 2.9  1.8 - 8.0 K/UL Final    ABS. LYMPHOCYTES 01/07/2020 1.7  0.9 - 3.6 K/UL Final    ABS. MONOCYTES 01/07/2020 0.7  0.05 - 1.2 K/UL Final    ABS. EOSINOPHILS 01/07/2020 0.2  0.0 - 0.4 K/UL Final    ABS. BASOPHILS 01/07/2020 0.0  0.0 - 0.1 K/UL Final    DF 01/07/2020 AUTOMATED    Final    LIPID PROFILE 01/07/2020        Final    Cholesterol, total 01/07/2020 225* <200 MG/DL Final    Triglyceride 01/07/2020 153* <150 MG/DL Final    Comment: The drugs N-acetylcysteine (NAC) and  Metamiszole have been found to cause falsely  low results in this chemical assay. Please  be sure to submit blood samples obtained  BEFORE administration of either of these  drugs to assure correct results.       HDL Cholesterol 01/07/2020 49  40 - 60 MG/DL Final    LDL, calculated 01/07/2020 145.4* 0 - 100 MG/DL Final    VLDL, calculated 01/07/2020 30.6  MG/DL Final    CHOL/HDL Ratio 01/07/2020 4.6  0 - 5.0   Final    Sodium 01/07/2020 138  136 - 145 mmol/L Final    Potassium 01/07/2020 4.3  3.5 - 5.5 mmol/L Final    Chloride 01/07/2020 105  100 - 111 mmol/L Final    CO2 01/07/2020 29  21 - 32 mmol/L Final    Anion gap 01/07/2020 4  3.0 - 18 mmol/L Final    Glucose 01/07/2020 90  74 - 99 mg/dL Final    BUN 01/07/2020 11  7.0 - 18 MG/DL Final    Creatinine 01/07/2020 1.14  0.6 - 1.3 MG/DL Final    BUN/Creatinine ratio 01/07/2020 10* 12 - 20   Final    GFR est AA 01/07/2020 >60  >60 ml/min/1.73m2 Final    GFR est non-AA 01/07/2020 >60  >60 ml/min/1.73m2 Final    Comment: (NOTE)  Estimated GFR is calculated using the Modification of Diet in Renal   Disease (MDRD) Study equation, reported for both  Americans   (GFRAA) and non- Americans (GFRNA), and normalized to 1.73m2   body surface area. The physician must decide which value applies to   the patient. The MDRD study equation should only be used in   individuals age 25 or older. It has not been validated for the   following: pregnant women, patients with serious comorbid conditions,   or on certain medications, or persons with extremes of body size,   muscle mass, or nutritional status.  Calcium 01/07/2020 9.2  8.5 - 10.1 MG/DL Final    Bilirubin, total 01/07/2020 0.7  0.2 - 1.0 MG/DL Final    ALT (SGPT) 01/07/2020 44  16 - 61 U/L Final    AST (SGOT) 01/07/2020 37  10 - 38 U/L Final    Alk. phosphatase 01/07/2020 66  45 - 117 U/L Final    Protein, total 01/07/2020 8.2  6.4 - 8.2 g/dL Final    Albumin 01/07/2020 3.8  3.4 - 5.0 g/dL Final    Globulin 01/07/2020 4.4* 2.0 - 4.0 g/dL Final    A-G Ratio 01/07/2020 0.9  0.8 - 1.7   Final       .No results found for any visits on 01/09/20. Assessment / Plan:      ICD-10-CM ICD-9-CM    1. Mixed hyperlipidemia E78.2 272.2 atorvastatin (LIPITOR) 20 mg tablet   2. Erectile dysfunction, unspecified erectile dysfunction type N52.9 607.84 sildenafil citrate (VIAGRA) 50 mg tablet     Hyperlipidemia-lipid panel elevated. Patient given a atorvastatin 20 mg tablet  Refilled Viagra  Hypertension-controlled. No change to current treatment plan    I asked the patient if he  had any questions and answered his  questions.   The patient stated that he understands the treatment plan and agrees with the treatment plan    This document was created with a voice activated dictation system and may contain transcription errors.

## 2020-01-09 NOTE — PROGRESS NOTES
Visit Vitals  /89   Pulse 71   Temp 97.1 °F (36.2 °C) (Oral)   Resp 12   Ht 6' 1\" (1.854 m)   Wt 301 lb (136.5 kg)   SpO2 95%   BMI 39.71 kg/m²     Naveed Melendez presents today for   Chief Complaint   Patient presents with    Hypertension    Results     lab       Is someone accompanying this pt? no    Is the patient using any DME equipment during OV? no    Depression Screening:  3 most recent PHQ Screens 1/9/2020   PHQ Not Done -   Little interest or pleasure in doing things Not at all   Feeling down, depressed, irritable, or hopeless Not at all   Total Score PHQ 2 0       Learning Assessment:  Learning Assessment 9/10/2019   PRIMARY LEARNER Patient   HIGHEST LEVEL OF EDUCATION - PRIMARY LEARNER  GRADUATED HIGH SCHOOL OR GED   BARRIERS PRIMARY LEARNER NONE   CO-LEARNER CAREGIVER No   PRIMARY LANGUAGE ENGLISH   LEARNER PREFERENCE PRIMARY DEMONSTRATION   ANSWERED BY patient   RELATIONSHIP SELF       Abuse Screening:  Abuse Screening Questionnaire 9/10/2019   Do you ever feel afraid of your partner? N   Are you in a relationship with someone who physically or mentally threatens you? N   Is it safe for you to go home? Y       Fall Risk  No flowsheet data found. Health Maintenance reviewed and discussed and ordered per Provider. There are no preventive care reminders to display for this patient. Coordination of Care:  1. Have you been to the ER, urgent care clinic since your last visit? Hospitalized since your last visit? no    2. Have you seen or consulted any other health care providers outside of the [address] [place] [address] since your last visit? Include any pap smears or colon screening.  no

## 2020-02-24 DIAGNOSIS — Z87.39 HISTORY OF GOUT: ICD-10-CM

## 2020-02-24 DIAGNOSIS — E78.2 MIXED HYPERLIPIDEMIA: ICD-10-CM

## 2020-02-24 DIAGNOSIS — I10 ESSENTIAL HYPERTENSION: ICD-10-CM

## 2020-02-24 NOTE — TELEPHONE ENCOUNTER
Requested Prescriptions     Pending Prescriptions Disp Refills    lisinopril (PRINIVIL, ZESTRIL) 20 mg tablet 90 Tab 0     Sig: Take 1 Tab by mouth daily.  allopurinoL (ZYLOPRIM) 100 mg tablet 270 Tab 0     Sig: Take 3 Tabs by mouth daily.  atorvastatin (LIPITOR) 20 mg tablet 90 Tab 1     Sig: Take 1 Tab by mouth daily.

## 2020-02-25 RX ORDER — ATORVASTATIN CALCIUM 20 MG/1
20 TABLET, FILM COATED ORAL DAILY
Qty: 90 TAB | Refills: 1 | Status: SHIPPED | OUTPATIENT
Start: 2020-02-25 | End: 2020-08-11 | Stop reason: SDUPTHER

## 2020-02-25 RX ORDER — ALLOPURINOL 100 MG/1
300 TABLET ORAL DAILY
Qty: 270 TAB | Refills: 0 | Status: SHIPPED | OUTPATIENT
Start: 2020-02-25 | End: 2020-08-11 | Stop reason: SDUPTHER

## 2020-02-25 RX ORDER — LISINOPRIL 20 MG/1
20 TABLET ORAL DAILY
Qty: 90 TAB | Refills: 0 | Status: SHIPPED | OUTPATIENT
Start: 2020-02-25 | End: 2020-08-11 | Stop reason: SDUPTHER

## 2020-04-27 DIAGNOSIS — N52.9 ERECTILE DYSFUNCTION, UNSPECIFIED ERECTILE DYSFUNCTION TYPE: ICD-10-CM

## 2020-04-27 RX ORDER — SILDENAFIL 50 MG/1
TABLET, FILM COATED ORAL
Qty: 30 TAB | Refills: 2 | Status: SHIPPED | OUTPATIENT
Start: 2020-04-27 | End: 2020-08-11 | Stop reason: SDUPTHER

## 2020-04-27 NOTE — TELEPHONE ENCOUNTER
Last seen 01/09/20  Next appt  07/07/20    Requested Prescriptions     Pending Prescriptions Disp Refills    sildenafil citrate (Viagra) 50 mg tablet 30 Tab 2     Sig: One tablet once daily 1 hour (range: 30 minutes to 4 hours) before sexual activity as needed

## 2020-07-07 ENCOUNTER — VIRTUAL VISIT (OUTPATIENT)
Dept: FAMILY MEDICINE CLINIC | Facility: CLINIC | Age: 40
End: 2020-07-07

## 2020-07-07 DIAGNOSIS — E66.01 OBESITY, MORBID (HCC): Primary | ICD-10-CM

## 2020-07-07 DIAGNOSIS — F10.10 ETOH ABUSE: ICD-10-CM

## 2020-07-07 DIAGNOSIS — E78.2 MIXED HYPERLIPIDEMIA: ICD-10-CM

## 2020-07-07 DIAGNOSIS — G47.30 SLEEP APNEA, UNSPECIFIED TYPE: ICD-10-CM

## 2020-07-07 DIAGNOSIS — N52.9 ERECTILE DYSFUNCTION, UNSPECIFIED ERECTILE DYSFUNCTION TYPE: ICD-10-CM

## 2020-07-07 DIAGNOSIS — I10 ESSENTIAL HYPERTENSION: ICD-10-CM

## 2020-07-07 RX ORDER — ACETAMINOPHEN 500 MG
1 TABLET ORAL DAILY
Qty: 1 KIT | Refills: 0 | Status: SHIPPED | OUTPATIENT
Start: 2020-07-07 | End: 2021-12-25

## 2020-07-07 NOTE — PROGRESS NOTES
Sowmya Ramirez is a 41 y.o. male who was seen by synchronous (real-time) audio-video technology on 7/7/2020 for No chief complaint on file. Assessment & Plan:   Diagnoses and all orders for this visit:    1. Obesity, morbid ([address]  -     CBC WITH AUTOMATED DIFF; Future  -     LIPID PANEL; Future  -     METABOLIC PANEL, COMPREHENSIVE; Future    2. Essential hypertension  -     Blood Pressure Test Kit-Large kit; 1 Kit by Does Not Apply route daily.  -     CBC WITH AUTOMATED DIFF; Future  -     METABOLIC PANEL, COMPREHENSIVE; Future    3. Mixed hyperlipidemia  -     LIPID PANEL; Future    4. Erectile dysfunction, unspecified erectile dysfunction type    5. Sleep apnea, unspecified type    6. ETOH abuse            Subjective: The patient presents for an Audio-visual teleconference appointment for hypertension and hyperlipidemia follow-up care. Notes he is feeling well today. Admits to increase weight can and increase beer and alcohol drinking. His weight is now between 315-320 lbs. HTN- admits he has not been taking his medication regularly. He does not take his blood pressure at home. He notes that over the last few weeks he has done taking her medication daily. Hyperlipidemia-last lipid panel in January, 2020. His cholesterol was 225, LDL was 145.4 and HDL was 49. Erectile dysfunction-patient previously prescribed Viagra. Is requesting a refill today but refill refused until able to evaluate blood pressure. Alcohol abuse-patient admits to drinking alcohol and beer daily. Patient notes that on Sunday he plans to start detoxing. He associates the alcohol drinking with his increased weight gain. Prior to Admission medications    Medication Sig Start Date End Date Taking? Authorizing Provider   Blood Pressure Test Kit-Large kit 1 Kit by Does Not Apply route daily.  7/7/20  Yes Genevieve Humphries NP   sildenafil citrate (Viagra) 50 mg tablet One tablet once daily 1 hour (range: 30 minutes to 4 hours) before sexual activity as needed 4/27/20   Caterina Manish, NP   lisinopril (PRINIVIL, ZESTRIL) 20 mg tablet Take 1 Tab by mouth daily. 2/25/20   Caterina Manish, NP   allopurinoL (ZYLOPRIM) 100 mg tablet Take 3 Tabs by mouth daily. 2/25/20   Caterina Manish, NP   atorvastatin (LIPITOR) 20 mg tablet Take 1 Tab by mouth daily. 2/25/20   Caterina Manish, NP   amLODIPine (NORVASC) 5 mg tablet Take 1 Tab by mouth daily. 10/9/19   Caterina Manish, NP   cpap machine kit by Does Not Apply route. 6/21/19   Caterina Manish, NP     Patient Active Problem List   Diagnosis Code    Acute pain of right knee M25.561    Swelling of knee joint M25.469    Essential hypertension I10    Hypertension I10    Renal insufficiency N28.9    Medical non-compliance Z91.19    Hyperlipidemia E78.5    Right knee pain M25.561    Elevated uric acid in blood E79.0    Obesity, morbid ([facility]) E66.01    Sleep apnea in adult G47.30     Patient Active Problem List    Diagnosis Date Noted    Sleep apnea in adult 08/09/2019    Obesity, morbid ([facility] [address] 04/09/2019    Acute pain of right knee 09/15/2016    Swelling of knee joint 09/15/2016    Essential hypertension 09/15/2016    Hypertension     Medical non-compliance     Hyperlipidemia     Elevated uric acid in blood 03/05/2015    Renal insufficiency 04/06/2011    Right knee pain 09/20/2010     Current Outpatient Medications   Medication Sig Dispense Refill    Blood Pressure Test Kit-Large kit 1 Kit by Does Not Apply route daily. 1 Kit 0    sildenafil citrate (Viagra) 50 mg tablet One tablet once daily 1 hour (range: 30 minutes to 4 hours) before sexual activity as needed 30 Tab 2    lisinopril (PRINIVIL, ZESTRIL) 20 mg tablet Take 1 Tab by mouth daily. 90 Tab 0    allopurinoL (ZYLOPRIM) 100 mg tablet Take 3 Tabs by mouth daily. 270 Tab 0    atorvastatin (LIPITOR) 20 mg tablet Take 1 Tab by mouth daily.  90 Tab 1    amLODIPine (NORVASC) 5 mg tablet Take 1 Tab by mouth daily. 90 Tab 0    cpap machine kit by Does Not Apply route. 1 Kit 0     No Known Allergies  Past Medical History:   Diagnosis Date    Elevated uric acid in blood 03/05/2015    8.6 mg/dL    Gout     Hyperlipidemia     Hypertension     Medical non-compliance     Renal insufficiency 04/06/2011    BUN/sCr: 14/1.4, GFR >60    Right knee pain 09/20/2010    Plain Film NEG    Right knee pain 04/10/2015    Plain Film NEG       ROS    Constitutional: No apparent distress noted  General-admits to increase weight gain. Notes his weight is between 315-320 lbs  Eyes- negative visual changes  CV- denies chest pain, palpitation  Pul: negative cough or SOB  GI: negative nausea, flank pain, diarrhea, constipation  Urinary:- No dysuria or polyuria  MS- negative myalgia, negative joint pain  Neuro- negative headache, dizziness or weakness  Skin- negative for rashes or lesions. Psych- denies any anxiety or depression    Objective:   No flowsheet data found.      [INSTRUCTIONS:  \"[x]\" Indicates a positive item  \"[]\" Indicates a negative item  -- DELETE ALL ITEMS NOT EXAMINED]    Constitutional: [x] Appears well-developed and well-nourished [x] No apparent distress      [] Abnormal -     Mental status: [x] Alert and awake  [x] Oriented to person/place/time [x] Able to follow commands    [] Abnormal -     Eyes:   EOM    [x]  Normal    [] Abnormal -   Sclera  [x]  Normal    [] Abnormal -          Discharge [x]  None visible   [] Abnormal -     HENT: [x] Normocephalic, atraumatic  [] Abnormal -   [x] Mouth/Throat: Mucous membranes are moist    External Ears [x] Normal  [] Abnormal -    Neck: [x] No visualized mass [] Abnormal -     Pulmonary/Chest: [x] Respiratory effort normal   [x] No visualized signs of difficulty breathing or respiratory distress        [] Abnormal -      Musculoskeletal:   [x] Normal gait with no signs of ataxia         [x] Normal range of motion of neck        [] Abnormal - Neurological:        [x] No Facial Asymmetry (Cranial nerve 7 motor function) (limited exam due to video visit)          [x] No gaze palsy        [] Abnormal -          Skin:        [x] No significant exanthematous lesions or discoloration noted on facial skin         [] Abnormal -            Psychiatric:       [x] Normal Affect [] Abnormal -        [x] No Hallucinations    Other pertinent observable physical exam findings:-        We discussed the expected course, resolution and complications of the diagnosis(es) in detail. Medication risks, benefits, costs, interactions, and alternatives were discussed as indicated. I advised him to contact the office if his condition worsens, changes or fails to improve as anticipated. He expressed understanding with the diagnosis(es) and plan. Vanessa Masterson, who was evaluated through a patient-initiated, synchronous (real-time) audio-video encounter, and/or his healthcare decision maker, is aware that it is a billable service, with coverage as determined by his insurance carrier. He provided verbal consent to proceed: Yes, and patient identification was verified. It was conducted pursuant to the emergency declaration under the [address] [place] [address] authority and the [employer] Act. A caregiver was present when appropriate. Ability to conduct physical exam was limited. I was at home. The patient was at home.       Linda Roland NP

## 2020-07-23 ENCOUNTER — APPOINTMENT (OUTPATIENT)
Dept: GENERAL RADIOLOGY | Age: 40
End: 2020-07-23
Attending: PHYSICIAN ASSISTANT
Payer: MEDICAID

## 2020-07-23 ENCOUNTER — PATIENT OUTREACH (OUTPATIENT)
Dept: CASE MANAGEMENT | Age: 40
End: 2020-07-23

## 2020-07-23 ENCOUNTER — HOSPITAL ENCOUNTER (EMERGENCY)
Age: 40
Discharge: HOME | End: 2020-07-23
Attending: EMERGENCY MEDICINE | Admitting: EMERGENCY MEDICINE
Payer: MEDICAID

## 2020-07-23 VITALS
HEART RATE: 85 BPM | BODY MASS INDEX: 41.99 KG/M2 | RESPIRATION RATE: 18 BRPM | TEMPERATURE: 100.7 F | WEIGHT: 310 LBS | SYSTOLIC BLOOD PRESSURE: 149 MMHG | OXYGEN SATURATION: 98 % | DIASTOLIC BLOOD PRESSURE: 99 MMHG | HEIGHT: 72 IN

## 2020-07-23 DIAGNOSIS — Z20.822 SUSPECTED COVID-19 VIRUS INFECTION: Primary | ICD-10-CM

## 2020-07-23 LAB
ALBUMIN SERPL-MCNC: 3.4 G/DL (ref 3.4–5)
ALBUMIN/GLOB SERPL: 0.7 {RATIO} (ref 0.8–1.7)
ALP SERPL-CCNC: 52 U/L (ref 45–117)
ALT SERPL-CCNC: 66 U/L (ref 16–61)
ANION GAP SERPL CALC-SCNC: 6 MMOL/L (ref 3–18)
AST SERPL-CCNC: 70 U/L (ref 10–38)
BASOPHILS # BLD: 0 K/UL (ref 0–0.1)
BASOPHILS NFR BLD: 0 % (ref 0–2)
BILIRUB SERPL-MCNC: 0.4 MG/DL (ref 0.2–1)
BUN SERPL-MCNC: 11 MG/DL (ref 7–18)
BUN/CREAT SERPL: 10 (ref 12–20)
CALCIUM SERPL-MCNC: 8.2 MG/DL (ref 8.5–10.1)
CHLORIDE SERPL-SCNC: 105 MMOL/L (ref 100–111)
CO2 SERPL-SCNC: 26 MMOL/L (ref 21–32)
CREAT SERPL-MCNC: 1.14 MG/DL (ref 0.6–1.3)
DIFFERENTIAL METHOD BLD: ABNORMAL
EOSINOPHIL # BLD: 0 K/UL (ref 0–0.4)
EOSINOPHIL NFR BLD: 0 % (ref 0–5)
ERYTHROCYTE [DISTWIDTH] IN BLOOD BY AUTOMATED COUNT: 13.9 % (ref 11.6–14.5)
GLOBULIN SER CALC-MCNC: 4.7 G/DL (ref 2–4)
GLUCOSE SERPL-MCNC: 105 MG/DL (ref 74–99)
HCT VFR BLD AUTO: 41.7 % (ref 36–48)
HGB BLD-MCNC: 14.2 G/DL (ref 13–16)
LACTATE BLD-SCNC: 0.97 MMOL/L (ref 0.4–2)
LYMPHOCYTES # BLD: 1 K/UL (ref 0.9–3.6)
LYMPHOCYTES NFR BLD: 26 % (ref 21–52)
MCH RBC QN AUTO: 28.8 PG (ref 24–34)
MCHC RBC AUTO-ENTMCNC: 34.1 G/DL (ref 31–37)
MCV RBC AUTO: 84.6 FL (ref 74–97)
MONOCYTES # BLD: 0.6 K/UL (ref 0.05–1.2)
MONOCYTES NFR BLD: 15 % (ref 3–10)
NEUTS SEG # BLD: 2.2 K/UL (ref 1.8–8)
NEUTS SEG NFR BLD: 59 % (ref 40–73)
PLATELET # BLD AUTO: 217 K/UL (ref 135–420)
PMV BLD AUTO: 10.3 FL (ref 9.2–11.8)
POTASSIUM SERPL-SCNC: 3.7 MMOL/L (ref 3.5–5.5)
PROT SERPL-MCNC: 8.1 G/DL (ref 6.4–8.2)
RBC # BLD AUTO: 4.93 M/UL (ref 4.7–5.5)
SODIUM SERPL-SCNC: 137 MMOL/L (ref 136–145)
WBC # BLD AUTO: 3.8 K/UL (ref 4.6–13.2)

## 2020-07-23 PROCEDURE — 83605 ASSAY OF LACTIC ACID: CPT

## 2020-07-23 PROCEDURE — 87635 SARS-COV-2 COVID-19 AMP PRB: CPT

## 2020-07-23 PROCEDURE — 87040 BLOOD CULTURE FOR BACTERIA: CPT

## 2020-07-23 PROCEDURE — 71045 X-RAY EXAM CHEST 1 VIEW: CPT

## 2020-07-23 PROCEDURE — 80053 COMPREHEN METABOLIC PANEL: CPT

## 2020-07-23 PROCEDURE — 99283 EMERGENCY DEPT VISIT LOW MDM: CPT

## 2020-07-23 PROCEDURE — 85025 COMPLETE CBC W/AUTO DIFF WBC: CPT

## 2020-07-23 NOTE — ED PROVIDER NOTES
EMERGENCY DEPARTMENT HISTORY AND PHYSICAL EXAM    4:29 AM  Date: 7/23/2020  Patient Name: Chata Stevens    History of Presenting Illness     Chief Complaint   Patient presents with    Cough    Vomiting        History Provided By: Patient    HPI: Chata Stevens is a 41 y.o. male with history of multiple medical problems as well. Patient is presenting with fever, body aches and cough for 2 days. According to the patient he decided to stop drinking alcohol after being a daily drinker about 10 days ago and went through withdrawals then and recovered. Was feeling better until 2 days ago he started feeling fatigued and tired and started coughing. Denies chest pain or shortness of breath. No history of sick contacts. No GI symptoms. Patient works at a [employer]. Location:  Severity:  Timing/course: Onset/Duration:     PCP: Arlene Wagner NP    Past History     Past Medical History:  Past Medical History:   Diagnosis Date    Elevated uric acid in blood 03/05/2015    8.6 mg/dL    Gout     Hyperlipidemia     Hypertension     Medical non-compliance     Renal insufficiency 04/06/2011    BUN/sCr: 14/1.4, GFR >60    Right knee pain 09/20/2010    Plain Film NEG    Right knee pain 04/10/2015    Plain Film NEG       Past Surgical History:  Past Surgical History:   Procedure Laterality Date    HX CYST REMOVAL      HX TONSILLECTOMY         Family History:  Family History   Problem Relation Age of Onset    Hypertension Mother     Gout Mother     Hypertension Father        Social History:  Social History     Tobacco Use    Smoking status: Never Smoker    Smokeless tobacco: Never Used   Substance Use Topics    Alcohol use: Yes     Comment: socially    Drug use: No       Allergies:  No Known Allergies    Review of Systems   Review of Systems   Constitutional: Positive for fatigue and fever. Respiratory: Positive for cough. Musculoskeletal: Positive for myalgias.    All other systems reviewed and are negative. Physical Exam     Patient Vitals for the past 12 hrs:   Temp Pulse Resp BP SpO2   07/23/20 0309 (!) 100.7 °F (38.2 °C) 85 18 (!) 149/99 93 %       Physical Exam  Vitals signs and nursing note reviewed. Constitutional:       Appearance: Normal appearance. HENT:      Head: Normocephalic and atraumatic. Eyes:      Extraocular Movements: Extraocular movements intact. Neck:      Musculoskeletal: Normal range of motion and neck supple. Cardiovascular:      Rate and Rhythm: Normal rate. Pulmonary:      Effort: Pulmonary effort is normal. No respiratory distress. Musculoskeletal: Normal range of motion. General: No deformity. Neurological:      General: No focal deficit present. Mental Status: He is alert and oriented to person, place, and time. Psychiatric:         Mood and Affect: Mood normal.         Behavior: Behavior normal.         Diagnostic Study Results     Labs -  Recent Results (from the past 12 hour(s))   POC LACTIC ACID    Collection Time: 07/23/20  3:58 AM   Result Value Ref Range    Lactic Acid (POC) 0.97 0.40 - 2.00 mmol/L       Radiologic Studies -   No results found. Medical Decision Making     ED Course: Progress Notes, Reevaluation, and Consults:    4:29 AM Initial assessment performed. The patients presenting problems have been discussed, and they/their family are in agreement with the care plan formulated and outlined with them. I have encouraged them to ask questions as they arise throughout their visit. Provider Notes (Medical Decision Making): 57-year-old male presenting with fever, body aches and cough for 2 days. Well-appearing on exam.  Has low-grade fever mildly tachycardic. High risk for COVID-19 given the presentation and his occupation. Will get some screening labs and a chest x-ray to evaluate for other etiologies as well as pneumonia. Will need to be tested for COVID-19.   If work-up is negative patient can be discharged home to self-isolation, he has been maintaining normal oxygen saturation and does not appear to be in distress. Procedures:     Critical Care Time:     Vital Signs-Reviewed the patient's vital signs. Reviewed pt's pulse ox reading. EKG: Interpreted by the EP. Time Interpreted:    Rate:    Rhythm:    Interpretation:   Comparison:     Records Reviewed: Nursing Notes (Time of Review: 4:29 AM)  -I am the first provider for this patient.  -I reviewed the vital signs, available nursing notes, past medical history, past surgical history, family history and social history. Current Outpatient Medications   Medication Sig Dispense Refill    Blood Pressure Test Kit-Large kit 1 Kit by Does Not Apply route daily. 1 Kit 0    sildenafil citrate (Viagra) 50 mg tablet One tablet once daily 1 hour (range: 30 minutes to 4 hours) before sexual activity as needed 30 Tab 2    lisinopril (PRINIVIL, ZESTRIL) 20 mg tablet Take 1 Tab by mouth daily. 90 Tab 0    allopurinoL (ZYLOPRIM) 100 mg tablet Take 3 Tabs by mouth daily. 270 Tab 0    atorvastatin (LIPITOR) 20 mg tablet Take 1 Tab by mouth daily. 90 Tab 1    amLODIPine (NORVASC) 5 mg tablet Take 1 Tab by mouth daily. 90 Tab 0    cpap machine kit by Does Not Apply route. 1 Kit 0        Clinical Impression     Clinical Impression: No diagnosis found. Disposition: DC      DISCHARGE NOTE:     Pt has been reexamined. Patient has no new complaints, changes, or physical findings. Care plan outlined and precautions discussed. Results of labs and imaging were reviewed with the patient. All medications were reviewed with the patient; will d/c home with return precautions. All of pt's questions and concerns were addressed. Patient was instructed and agrees to follow up with PCP, as well as to return to the ED upon further deterioration. Patient is ready to go home.     This note was dictated utilizing voice recognition software which may lead to typographical errors. I apologize in advance if the situation occurs. If questions arise please do not hesitate to contact me or call our department.     Heather Gamez MD  4:29 AM

## 2020-07-23 NOTE — PROGRESS NOTES
Date/Time:  7/23/2020 2:18 PM  Attempted to reach patient by telephone. Left HIPPA compliant message requesting a return call. Will attempt to reach patient again.

## 2020-07-23 NOTE — ED TRIAGE NOTES
Pt states he detoxed off alcohol, last drink 2 weeks ao, pt states he feels nauseated, weak and has had a cough and feels sluggish, no fevers, no positive COVID exposure that he knows of

## 2020-07-26 LAB — SARS-COV-2, COV2NT: DETECTED

## 2020-07-27 ENCOUNTER — PATIENT OUTREACH (OUTPATIENT)
Dept: CASE MANAGEMENT | Age: 40
End: 2020-07-27

## 2020-07-27 NOTE — PROGRESS NOTES
Patient contacted regarding COVID-19 diagnosis. Care Transition Nurse/ Ambulatory Care Manager contacted the patient by telephone to perform post discharge assessment. Verified name and  with patient as identifiers. Provided introduction to self, and explanation of the CTN/ACM role, and reason for call due to risk factors for infection and/or exposure to COVID-19. Symptoms reviewed with patient who verbalized the following symptoms: no new symptoms and no worsening symptoms. Due to no new or worsening symptoms encounter was not routed to provider for escalation. Patient has following risk factors of: no known risk factors. CTN/ACM reviewed discharge instructions, medical action plan and red flags such as increased shortness of breath, increasing fever and signs of decompensation with patient who verbalized understanding. Discussed exposure protocols and quarantine with CDC Guidelines What to do if you are sick with coronavirus disease 2019.  Patient was given an opportunity for questions and concerns. The patient agrees to contact the [facility] exposure line [phone], local [facility]  ([phone]) and PCP office for questions related to their healthcare. CTN/ACM provided contact information for future needs. Reviewed and educated patient on any new and changed medications related to discharge diagnosis. Patient/family/caregiver given information for [employer] and agrees to enroll no    Patient's preferred e-mail:      Patient's preferred phone number:       Based on Loop alert triggers, patient will be contacted by nurse care manager for worsening symptoms. Plan for follow-up call in 5-7 days based on severity of symptoms and risk factors.

## 2020-07-29 LAB
BACTERIA SPEC CULT: NORMAL
BACTERIA SPEC CULT: NORMAL
SERVICE CMNT-IMP: NORMAL
SERVICE CMNT-IMP: NORMAL

## 2020-07-31 ENCOUNTER — PATIENT OUTREACH (OUTPATIENT)
Dept: CASE MANAGEMENT | Age: 40
End: 2020-07-31

## 2020-07-31 NOTE — PROGRESS NOTES
Patient States no symptoms at present. COVID-19 Screening Follow-up Note    Patient contacted regarding COVID-19 risk and screening. Care Transition Nurse/ Ambulatory Care Manager contacted the patient by telephone to perform follow-up assessment. Verified name and  with patient as identifiers. Patient has following risk factors of: no known risk factors. Symptoms reviewed with patient who verbalized the following symptoms: no new symptoms and no worsening symptoms. Due to no new or worsening symptoms encounter was not routed to provider for escalation. Education provided regarding infection prevention, and signs and symptoms of COVID-19 and when to seek medical attention with patient who verbalized understanding. Discussed exposure protocols and quarantine from [address] you at higher risk for severe illness  and given an opportunity for questions and concerns. The patient agrees to contact the COVID-19 hotline [phone] or PCP office for questions related to their healthcare. CTN/ACM provided contact information for future reference. From CDC: Are you at higher risk for severe illness?  Wash your hands often.  Avoid close contact (6 feet, which is about two arm lengths) with people who are sick.  Put distance between yourself and other people if COVID-19 is spreading in your community.  Clean and disinfect frequently touched surfaces.  Avoid all cruise travel and non-essential air travel.  Call your healthcare professional if you have concerns about COVID-19 and your underlying condition or if you are sick. For more information on steps you can take to protect yourself, see CDC's How to [place] for follow-up call in 7-14 days based on severity of symptoms and risk factors.

## 2020-08-06 ENCOUNTER — PATIENT OUTREACH (OUTPATIENT)
Dept: CASE MANAGEMENT | Age: 40
End: 2020-08-06

## 2020-08-06 NOTE — PROGRESS NOTES
Patient resolved from Transition of Care episode on 8-6-20  Patient/family has been provided the following resources and education related to COVID-19:                         Signs, symptoms and red flags related to COVID-19            CDC exposure and quarantine guidelines            [facility] exposure contact - [phone]            Contact for their local Department of Health                 Patient currently reports that the following symptoms have improved:  no new symptoms and no worsening symptoms. No further outreach scheduled with this CTN/ACM. Episode of Care resolved. Patient has this CTN/ACM contact information if future needs arise.

## 2020-08-11 ENCOUNTER — TELEPHONE (OUTPATIENT)
Dept: FAMILY MEDICINE CLINIC | Facility: CLINIC | Age: 40
End: 2020-08-11

## 2020-08-11 DIAGNOSIS — N52.9 ERECTILE DYSFUNCTION, UNSPECIFIED ERECTILE DYSFUNCTION TYPE: ICD-10-CM

## 2020-08-11 DIAGNOSIS — I10 ESSENTIAL HYPERTENSION: ICD-10-CM

## 2020-08-11 DIAGNOSIS — E78.2 MIXED HYPERLIPIDEMIA: ICD-10-CM

## 2020-08-11 DIAGNOSIS — Z87.39 HISTORY OF GOUT: ICD-10-CM

## 2020-08-11 RX ORDER — SILDENAFIL 50 MG/1
TABLET, FILM COATED ORAL
Qty: 30 TAB | Refills: 2 | Status: SHIPPED | OUTPATIENT
Start: 2020-08-11 | End: 2021-03-04 | Stop reason: SDUPTHER

## 2020-08-11 RX ORDER — LISINOPRIL 20 MG/1
20 TABLET ORAL DAILY
Qty: 90 TAB | Refills: 0 | Status: SHIPPED | OUTPATIENT
Start: 2020-08-11 | End: 2020-12-22 | Stop reason: SDUPTHER

## 2020-08-11 RX ORDER — AMLODIPINE BESYLATE 5 MG/1
5 TABLET ORAL DAILY
Qty: 90 TAB | Refills: 0 | Status: SHIPPED | OUTPATIENT
Start: 2020-08-11 | End: 2020-12-22 | Stop reason: SDUPTHER

## 2020-08-11 RX ORDER — ALLOPURINOL 100 MG/1
300 TABLET ORAL DAILY
Qty: 270 TAB | Refills: 0 | Status: SHIPPED | OUTPATIENT
Start: 2020-08-11 | End: 2020-12-22 | Stop reason: SDUPTHER

## 2020-08-11 RX ORDER — ATORVASTATIN CALCIUM 20 MG/1
20 TABLET, FILM COATED ORAL DAILY
Qty: 90 TAB | Refills: 1 | Status: SHIPPED | OUTPATIENT
Start: 2020-08-11 | End: 2020-12-22 | Stop reason: SDUPTHER

## 2020-08-11 NOTE — TELEPHONE ENCOUNTER
Requested Prescriptions     Pending Prescriptions Disp Refills    lisinopriL (PRINIVIL, ZESTRIL) 20 mg tablet 90 Tab 0     Sig: Take 1 Tab by mouth daily.  sildenafil citrate (Viagra) 50 mg tablet 30 Tab 2     Sig: One tablet once daily 1 hour (range: 30 minutes to 4 hours) before sexual activity as needed    atorvastatin (LIPITOR) 20 mg tablet 90 Tab 1     Sig: Take 1 Tab by mouth daily.  allopurinoL (ZYLOPRIM) 100 mg tablet 270 Tab 0     Sig: Take 3 Tabs by mouth daily.  amLODIPine (NORVASC) 5 mg tablet 90 Tab 0     Sig: Take 1 Tab by mouth daily.

## 2020-08-12 DIAGNOSIS — U07.1 INFECTION DUE TO 2019 NOVEL CORONAVIRUS: Primary | ICD-10-CM

## 2020-08-13 ENCOUNTER — HOSPITAL ENCOUNTER (OUTPATIENT)
Dept: LAB | Age: 40
Discharge: HOME | End: 2020-08-13
Payer: MEDICAID

## 2020-08-13 DIAGNOSIS — U07.1 INFECTION DUE TO 2019 NOVEL CORONAVIRUS: ICD-10-CM

## 2020-08-13 PROCEDURE — 87635 SARS-COV-2 COVID-19 AMP PRB: CPT

## 2020-08-14 LAB — SARS-COV-2, COV2NT: NOT DETECTED

## 2020-08-19 ENCOUNTER — TELEPHONE (OUTPATIENT)
Dept: FAMILY MEDICINE CLINIC | Facility: CLINIC | Age: 40
End: 2020-08-19

## 2020-08-19 NOTE — LETTER
NOTIFICATION RETURN TO WORK / SCHOOL 
 
8/20/2020 2:32 PM 
 
Mr. Naveed Blackwell [address] [facility] [address] To Whom It May Concern: This letter is to inform you that your Covid-19 test has returned \"not detected\" of the coronavirus. You may return to work. Please wear a mask when outside of your home. If there are any questions or concerns please contact our office. If there are questions or concerns please have the patient contact our office.  
 
 
 
Sincerely, 
 
 
Riri Baez, DNP

## 2020-08-20 NOTE — TELEPHONE ENCOUNTER
Patient was called and verified 2 identifiers. Patient was notified that his covid test was negative and to social distance, use hand  nad wear his mask. Patient also need a note that it was negative for work. No further action needed.

## 2020-08-22 ENCOUNTER — HOSPITAL ENCOUNTER (EMERGENCY)
Age: 40
Discharge: HOME | End: 2020-08-22
Attending: EMERGENCY MEDICINE
Payer: MEDICAID

## 2020-08-22 ENCOUNTER — APPOINTMENT (OUTPATIENT)
Dept: GENERAL RADIOLOGY | Age: 40
End: 2020-08-22
Attending: EMERGENCY MEDICINE
Payer: MEDICAID

## 2020-08-22 VITALS
DIASTOLIC BLOOD PRESSURE: 104 MMHG | HEIGHT: 72 IN | WEIGHT: 300 LBS | OXYGEN SATURATION: 97 % | SYSTOLIC BLOOD PRESSURE: 150 MMHG | HEART RATE: 87 BPM | TEMPERATURE: 97.3 F | RESPIRATION RATE: 16 BRPM | BODY MASS INDEX: 40.63 KG/M2

## 2020-08-22 DIAGNOSIS — M54.50 ACUTE MIDLINE LOW BACK PAIN WITHOUT SCIATICA: Primary | ICD-10-CM

## 2020-08-22 DIAGNOSIS — S80.12XA CONTUSION OF LEFT KNEE AND LOWER LEG, INITIAL ENCOUNTER: ICD-10-CM

## 2020-08-22 DIAGNOSIS — S80.02XA CONTUSION OF LEFT KNEE AND LOWER LEG, INITIAL ENCOUNTER: ICD-10-CM

## 2020-08-22 PROCEDURE — 73564 X-RAY EXAM KNEE 4 OR MORE: CPT

## 2020-08-22 PROCEDURE — 99281 EMR DPT VST MAYX REQ PHY/QHP: CPT

## 2020-08-22 RX ORDER — ACETAMINOPHEN 500 MG
1000 TABLET ORAL ONCE
Status: DISCONTINUED | OUTPATIENT
Start: 2020-08-22 | End: 2020-08-23 | Stop reason: HOSPADM

## 2020-08-22 RX ORDER — TRAMADOL HYDROCHLORIDE 50 MG/1
50 TABLET ORAL
Qty: 6 TAB | Refills: 0 | Status: SHIPPED | OUTPATIENT
Start: 2020-08-22 | End: 2020-08-25

## 2020-08-23 NOTE — ED PROVIDER NOTES
EMERGENCY DEPARTMENT HISTORY AND PHYSICAL EXAM    10:41 PM severe crowding in the emergency department, patient is seen in the results waiting area      Date: 8/22/2020  Patient Name: Jelly Ramires    History of Presenting Illness     Chief Complaint   Patient presents with    Knee Pain    Back Pain         History Provided By: patient    Additional History (Context): Jelly Ramires is a 41 y.o. male presents with history of sleep apnea and morbid obesity, had an injury 48 hours ago he lifted a desk and ended up dropping it on his knee, his left knee. He is able to bear weight and walk. He has been treating with ibuprofen. He also has pain in his lower back from lifting the desk. Dioni Angry He last took ibuprofen at 6 PM tonight. He rates the pain currently as severe. PCP: Joel Bell MD    Chief Complaint:   Duration:    Timing:    Location:   Quality:   Severity:   Modifying Factors:   Associated Symptoms:       Current Outpatient Medications   Medication Sig Dispense Refill    lisinopriL (PRINIVIL, ZESTRIL) 20 mg tablet Take 1 Tab by mouth daily. 90 Tab 0    sildenafil citrate (Viagra) 50 mg tablet One tablet once daily 1 hour (range: 30 minutes to 4 hours) before sexual activity as needed 30 Tab 2    atorvastatin (LIPITOR) 20 mg tablet Take 1 Tab by mouth daily. 90 Tab 1    allopurinoL (ZYLOPRIM) 100 mg tablet Take 3 Tabs by mouth daily. 270 Tab 0    amLODIPine (NORVASC) 5 mg tablet Take 1 Tab by mouth daily. 90 Tab 0    Blood Pressure Test Kit-Large kit 1 Kit by Does Not Apply route daily. 1 Kit 0    cpap machine kit by Does Not Apply route.  1 Kit 0       Past History     Past Medical History:  Past Medical History:   Diagnosis Date    Elevated uric acid in blood 03/05/2015    8.6 mg/dL    Gout     Hyperlipidemia     Hypertension     Medical non-compliance     Renal insufficiency 04/06/2011    BUN/sCr: 14/1.4, GFR >60    Right knee pain 09/20/2010    Plain [employer] NEG    Right knee pain 04/10/2015    Plain Film NEG       Past Surgical History:  Past Surgical History:   Procedure Laterality Date    HX CYST REMOVAL      HX TONSILLECTOMY         Family History:  Family History   Problem Relation Age of Onset    Hypertension Mother     Gout Mother     Hypertension Father        Social History:  Social History     Tobacco Use    Smoking status: Never Smoker    Smokeless tobacco: Never Used   Substance Use Topics    Alcohol use: Yes     Comment: socially    Drug use: No       Allergies:  No Known Allergies      Review of Systems     Review of Systems   Constitutional: Negative for diaphoresis and fever. HENT: Negative for congestion and sore throat. Eyes: Negative for pain and itching. Respiratory: Negative for cough and shortness of breath. Cardiovascular: Negative for chest pain and palpitations. Gastrointestinal: Negative for abdominal pain and diarrhea. Endocrine: Negative for polydipsia and polyuria. Genitourinary: Negative for dysuria and hematuria. Musculoskeletal: Positive for arthralgias and myalgias. Skin: Negative for rash and wound. Neurological: Negative for seizures and syncope. Hematological: Does not bruise/bleed easily. Psychiatric/Behavioral: Negative for agitation and hallucinations. Physical Exam       No data found. Physical Exam  Vitals signs and nursing note reviewed. Constitutional:       General: He is in acute distress (Appears to have mild pain. ). Appearance: He is well-developed. He is obese. He is not toxic-appearing. Comments: Sleeping initially awakes easily   HENT:      Head: Normocephalic and atraumatic. Eyes:      General: No scleral icterus. Conjunctiva/sclera: Conjunctivae normal.   Neck:      Musculoskeletal: Normal range of motion and neck supple. Vascular: No JVD. Cardiovascular:      Rate and Rhythm: Normal rate and regular rhythm.    Pulmonary:      Effort: Pulmonary effort is normal. No respiratory distress. Musculoskeletal: Normal range of motion. Comments: No specific area of tenderness in the back. No limitation of movement. Neurologically intact. No limitations of movement or deformity of the left knee. Skin:     General: Skin is warm and dry. Neurological:      Mental Status: He is alert. Psychiatric:         Thought Content: Thought content normal.         Judgment: Judgment normal.           Diagnostic Study Results   Labs -  No results found for this or any previous visit (from the past 12 hour(s)). Radiologic Studies -   XR KNEE LT MIN 4 V   Final Result   Impression:       No evidence of acute fracture or dislocation. Evidence for small joint effusion. Very mild degenerative changes. No results found. Medications ordered:   Medications - No data to display      Medical Decision Making   Initial Medical Decision Making and DDx: We will get film of the left lower extremity. Do not suspect spinal fracture or spinal cord impingement. He should continue the ibuprofen. He complains it is not working, I do not think narcotics are advisable for this pathology. ED Course: Progress Notes, Reevaluation, and Consults:         I am the first provider for this patient. I reviewed the vital signs, available nursing notes, past medical history, past surgical history, family history and social history. No data found. Vital Signs-Reviewed the patient's vital signs. Pulse Oximetry Analysis, Cardiac Monitor, 12 lead ekg:      Interpreted by the EP. Records Reviewed: Nursing notes reviewed (Time of Review: 10:41 PM)    Procedures:   Critical Care Time:   Aspirin: (was aspirin given for stroke?)    Diagnosis     Clinical Impression:   1. Acute midline low back pain without sciatica    2.  Contusion of left knee and lower leg, initial encounter        Disposition: Discharged      Follow-up Information     Follow up With Specialties Details Why Contact Info    Heather Shepherd MD Internal Medicine In 3 days             Discharge Medication List as of 8/22/2020 11:32 PM      START taking these medications    Details   traMADoL (Ultram) 50 mg tablet Take 1 Tab by mouth every four (4) hours as needed for Pain for up to 3 days. Max Daily Amount: 300 mg., Normal, Disp-6 Tab,R-0         CONTINUE these medications which have NOT CHANGED    Details   lisinopriL (PRINIVIL, ZESTRIL) 20 mg tablet Take 1 Tab by mouth daily. , Normal, Disp-90 Tab,R-0      sildenafil citrate (Viagra) 50 mg tablet One tablet once daily 1 hour (range: 30 minutes to 4 hours) before sexual activity as needed, Normal, Disp-30 Tab,R-2      atorvastatin (LIPITOR) 20 mg tablet Take 1 Tab by mouth daily. , Normal, Disp-90 Tab,R-1      allopurinoL (ZYLOPRIM) 100 mg tablet Take 3 Tabs by mouth daily. , Normal, Disp-270 Tab,R-0      amLODIPine (NORVASC) 5 mg tablet Take 1 Tab by mouth daily. , Normal, Disp-90 Tab,R-0      Blood Pressure Test Kit-Large kit 1 Kit by Does Not Apply route daily. , Normal, Disp-1 Kit, R-0      cpap machine kit by Does Not Apply route. , Print, Disp-1 Kit, R-0           _______________________________    Notes:    Liberty Brittle, MD using Dragon dictation      _______________________________

## 2020-08-23 NOTE — DISCHARGE INSTRUCTIONS
Patient Education        Learning About Relief for Back Pain  What is back tension and strain? Back strain happens when you overstretch, or pull, a muscle in your back. You may hurt your back in an accident or when you exercise or lift something. Most back pain will get better with rest and time. You can take care of yourself at home to help your back heal.  What can you do first to relieve back pain? When you first feel back pain, try these steps:  · Walk. Take a short walk (10 to 20 minutes) on a level surface (no slopes, hills, or stairs) every 2 to 3 hours. Walk only distances you can manage without pain, especially leg pain. · Relax. Find a comfortable position for rest. Some people are comfortable on the floor or a medium-firm bed with a small pillow under their head and another under their knees. Some people prefer to lie on their side with a pillow between their knees. Don't stay in one position for too long. · Try heat or ice. Try using a heating pad on a low or medium setting, or take a warm shower, for 15 to 20 minutes every 2 to 3 hours. Or you can buy single-use heat wraps that last up to 8 hours. You can also try an ice pack for 10 to 15 minutes every 2 to 3 hours. You can use an ice pack or a bag of frozen vegetables wrapped in a thin towel. There is not strong evidence that either heat or ice will help, but you can try them to see if they help. You may also want to try switching between heat and cold. · Take pain medicine exactly as directed. ¨ If the doctor gave you a prescription medicine for pain, take it as prescribed. ¨ If you are not taking a prescription pain medicine, ask your doctor if you can take an over-the-counter medicine. What else can you do? · Stretch and exercise. Exercises that increase flexibility may relieve your pain and make it easier for your muscles to keep your spine in a good, neutral position. And don't forget to keep walking. · Do self-massage.  You can use self-massage to unwind after work or school or to energize yourself in the morning. You can easily massage your feet, hands, or neck. Self-massage works best if you are in comfortable clothes and are sitting or lying in a comfortable position. Use oil or lotion to massage bare skin. · Reduce stress. Back pain can lead to a vicious Resighini: Distress about the pain tenses the muscles in your back, which in turn causes more pain. Learn how to relax your mind and your muscles to lower your stress. Where can you learn more? Go to http://elliCargoSpottervani.info/. Enter R259 in the search box to learn more about \"Learning About Relief for Back Pain. \"  Current as of: March 21, 2017  Content Version: 11.5  © 5318-1222 [employer]. Care instructions adapted under license by [employer] (which disclaims liability or warranty for this information). If you have questions about a medical condition or this instruction, always ask your healthcare professional. [place] [address] any warranty or liability for your use of this information. Patient Education     Contusion: Care Instructions  Your Care Instructions  Contusion is the medical term for a bruise. It is the result of a direct blow or an impact, such as a fall. Contusions are common sports injuries. Most people think of a bruise as a black-and-blue spot. This happens when small blood vessels get torn and leak blood under the skin. But bones, muscles, and organs can also get bruised. This may damage deep tissues but not cause a bruise you can see. The doctor will do a physical exam to find the location of your contusion. You may also have tests to make sure you do not have a more serious injury, such as a broken bone or nerve damage. These may include X-rays or other imaging tests like a CT scan or MRI. Deep-tissue contusions may cause pain and swelling.  But if there is no serious damage, they will often get better in a few weeks with home treatment. The doctor has checked you carefully, but problems can develop later. If you notice any problems or new symptoms, get medical treatment right away. Follow-up care is a key part of your treatment and safety. Be sure to make and go to all appointments, and call your doctor if you are having problems. It's also a good idea to know your test results and keep a list of the medicines you take. How can you care for yourself at home? · Put ice or a cold pack on the sore area for 10 to 20 minutes at a time to stop swelling. Put a thin cloth between the ice pack and your skin. · Be safe with medicines. Read and follow all instructions on the label. ¨ If the doctor gave you a prescription medicine for pain, take it as prescribed. ¨ If you are not taking a prescription pain medicine, ask your doctor if you can take an over-the-counter medicine. · If you can, prop up the sore area on pillows as much as possible for the next few days. Try to keep the sore area above the level of your heart. When should you call for help? Call your doctor now or seek immediate medical care if:  · Your pain gets worse. · You have new or worse swelling. · You have tingling, weakness, or numbness in the area near the contusion. · The area near the contusion is cold or pale. Watch closely for changes in your health, and be sure to contact your doctor if:  · You do not get better as expected. Where can you learn more? Go to SAVORTEX.be  Enter F260462 in the search box to learn more about \"Contusion: Care Instructions. \"   © 9861-8299 [employer]. Care instructions adapted under license by [employer] (which disclaims liability or warranty for this information).  This care instruction is for use with your licensed healthcare professional. If you have questions about a medical condition or this instruction, always ask your healthcare professional. [employer] disclaims any warranty or liability for your use of this information.   Content Version: 07.0.687482; Current as of: May 22, 2015

## 2020-09-03 ENCOUNTER — OFFICE VISIT (OUTPATIENT)
Dept: ORTHOPEDIC SURGERY | Facility: CLINIC | Age: 40
End: 2020-09-03

## 2020-09-03 VITALS
HEART RATE: 120 BPM | TEMPERATURE: 96.8 F | HEIGHT: 72 IN | BODY MASS INDEX: 40.63 KG/M2 | DIASTOLIC BLOOD PRESSURE: 93 MMHG | OXYGEN SATURATION: 95 % | SYSTOLIC BLOOD PRESSURE: 147 MMHG | WEIGHT: 300 LBS

## 2020-09-03 DIAGNOSIS — M25.562 CHRONIC PAIN OF LEFT KNEE: ICD-10-CM

## 2020-09-03 DIAGNOSIS — G89.29 CHRONIC PAIN OF LEFT KNEE: ICD-10-CM

## 2020-09-03 DIAGNOSIS — M17.12 PRIMARY OSTEOARTHRITIS OF LEFT KNEE: ICD-10-CM

## 2020-09-03 DIAGNOSIS — M25.462 EFFUSION OF LEFT KNEE: ICD-10-CM

## 2020-09-03 DIAGNOSIS — M10.9 GOUT OF LEFT KNEE, UNSPECIFIED CAUSE, UNSPECIFIED CHRONICITY: Primary | ICD-10-CM

## 2020-09-03 RX ORDER — BETAMETHASONE SODIUM PHOSPHATE AND BETAMETHASONE ACETATE 3; 3 MG/ML; MG/ML
6 INJECTION, SUSPENSION INTRA-ARTICULAR; INTRALESIONAL; INTRAMUSCULAR; SOFT TISSUE ONCE
Qty: 0.5 ML | Refills: 0
Start: 2020-09-03 | End: 2020-09-03

## 2020-09-03 RX ORDER — INDOMETHACIN 50 MG/1
50 CAPSULE ORAL
Qty: 60 CAP | Refills: 2 | Status: SHIPPED | OUTPATIENT
Start: 2020-09-03 | End: 2020-12-02

## 2020-09-03 NOTE — LETTER
NOTIFICATION RETURN TO WORK  
 
9/3/2020 4:11 PM 
 
Mr. Naveed Maldonado [address] [place] [address] To Whom It May Concern: Brannon Barrera is currently under the care of [address] [place] [address]. He will return to work on: Tuesday 9-8-2020 If there are questions or concerns please have the patient contact our office.  
 
 
 
Sincerely, 
 
 
 
Ramandeep Botello MD

## 2020-09-03 NOTE — PROGRESS NOTES
Patient: Levy Lo                MRN: [id number]       SSN: [id number]  YOB: 1980        AGE: 41 y.o. SEX: male    PCP: Suhail Wasserman MD  09/03/20    CC: GOUT OF LEFT KNEE    HISTORY:  Levy Lo is a 41 y.o. male who sustained a left knee injury on 8/22/20. He felt a sharp pain in his left knee when he was walking up his porch steps last week. He was seen at [facility] on the same day where left knee x rays revealed no acute findings. He has been experiencing pain and swelling since the injury. He states his left knee feels like dead weight. He denies any fever. His previous right knee aspiration revealed uric acid crystals. He was previously seen for right knee pain. He responded nicely to his aspiration and injection at previous ov. Synovial fluid analysis was + for uric acid crystals and culture was negative. The Indocin helped. He has a h/o gout. He kneels to buff floors at work. He had DCed Indocin last OV and started to take Ibuprofen. The Ibuprofen had not helped him much so he had taken a few doses of Indocin again. He had been seen by his PCP who provided him an Rx for Allopurinol and DC'd the Indocin. He has a h/o of gout. He tested positive for Coronavirus on 7/23/20. 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 He lives in [facility] [place] [facility] with his fiyonie. He has 8 children  21, 24, 16, 15, 8, 5, 2 yo daughters and one 12 yo son. None of his children live at home. He recently lost 10 pounds.  He knows Alvenia Player family members and met her at a wedding once - but she was very snobby. Mr. Homer Wong weighs 300 lbs and is 6'1\" tall. Lab Results   Component Value Date/Time    Hemoglobin A1c 5.5 05/29/2019 03:08 PM     Weight Metrics 9/3/2020 8/22/2020 7/23/2020 1/9/2020 10/9/2019 9/10/2019 8/9/2019   Weight 300 lb 300 lb 310 lb 301 lb 293 lb 290 lb 3.2 oz 292 lb   BMI 40.69 kg/m2 40.69 kg/m2 42.04 kg/m2 39.71 kg/m2 38.66 kg/m2 38.29 kg/m2 38.52 kg/m2       Patient Active Problem List   Diagnosis Code    Acute pain of right knee M25.561    Swelling of knee joint M25.469    Essential hypertension I10    Hypertension I10    Renal insufficiency N28.9    Medical non-compliance Z91.19    Hyperlipidemia E78.5    Right knee pain M25.561    Elevated uric acid in blood E79.0    Obesity, morbid ([address] E66.01    Sleep apnea in adult G47.30     REVIEW OF SYSTEMS: All Below are Negative except: See HPI   Constitutional: negative for fever, chills, and weight loss. Cardiovascular: negative for chest pain, claudication, leg swelling, SOB, LEO   Gastrointestinal: Negative for pain, N/V/C/D, Blood in stool or urine, dysuria, hematuria, incontinence, pelvic pain. Musculoskeletal: See HPI   Neurological: Negative for dizziness and weakness. Negative for headaches, Visual changes, confusion, seizures   Phychiatric/Behavioral: Negative for depression, memory loss, substance abuse. Extremities: Negative for hair changes, rash, or skin lesion changes. Hematologic: Negative for bleeding problems, bruising, pallor or swollen lymph nodes   Peripheral Vascular: No calf pain, no circulation deficits.     Social History     Socioeconomic History    Marital status: SINGLE     Spouse name: Not on file    Number of children: Not on file    Years of education: Not on file    Highest education level: Not on file   Occupational History    Not on file   Social Needs    Financial resource strain: Not on file    Food insecurity     Worry: Not on file     Inability: Not on file    Transportation needs     Medical: Not on file     Non-medical: Not on file   Tobacco Use    Smoking status: Never Smoker    Smokeless tobacco: Never Used   Substance and Sexual Activity    Alcohol use: Yes     Comment: socially    Drug use: No    Sexual activity: Yes     Partners: Female     Birth control/protection: Condom   Lifestyle    Physical activity     Days per week: Not on file     Minutes per session: Not on file    Stress: Not on file   Relationships    Social connections     Talks on phone: Not on file     Gets together: Not on file     Attends Anglican service: Not on file     Active member of club or organization: Not on file     Attends meetings of clubs or organizations: Not on file     Relationship status: Not on file    Intimate partner violence     Fear of current or ex partner: Not on file     Emotionally abused: Not on file     Physically abused: Not on file     Forced sexual activity: Not on file   Other Topics Concern    Not on file   Social History Narrative    Not on file      No Known Allergies   Current Outpatient Medications   Medication Sig    lisinopriL (PRINIVIL, ZESTRIL) 20 mg tablet Take 1 Tab by mouth daily.  sildenafil citrate (Viagra) 50 mg tablet One tablet once daily 1 hour (range: 30 minutes to 4 hours) before sexual activity as needed    atorvastatin (LIPITOR) 20 mg tablet Take 1 Tab by mouth daily.  amLODIPine (NORVASC) 5 mg tablet Take 1 Tab by mouth daily.  Blood Pressure Test Kit-Large kit 1 Kit by Does Not Apply route daily.  cpap machine kit by Does Not Apply route.  allopurinoL (ZYLOPRIM) 100 mg tablet Take 3 Tabs by mouth daily. No current facility-administered medications for this visit.        PHYSICAL EXAMINATION:  Visit Vitals  BP (!) 147/93 (BP 1 Location: Left arm)   Pulse (!) 120   Temp 96.8 °F (36 °C)   Ht 6' (1.829 m)   Wt 300 lb (136.1 kg)   SpO2 95%   BMI 40.69 kg/m²      ORTHO EXAMINATION:  Examination Right knee Left knee   Skin Intact Intact   Range of motion 120-5 80-10   Effusion + 4+   Medial joint line tenderness + +   Lateral joint line tenderness - -   Popliteal tenderness - -   Osteophytes palpable + -   Yariels - -   Patella crepitus - -   Anterior drawer - -   Lateral laxity - -   Medial laxity - -   Varus deformity - -   Valgus deformity - -   Pretibial edema - +   Calf tenderness - -     TIME OUT:  Chart reviewed for the following:   Yamila Loo MD, have reviewed the History, Physical and updated the Allergic reactions for [facility] performed immediately prior to start of procedure:  Yamila Loo MD, have performed the following reviews on [address] [place] prior to the start of the procedure:          * Patient was identified by name and date of birth   * Agreement on procedure being performed was verified  * Risks and Benefits explained to the patient  * Procedure site verified and marked as necessary  * Patient was positioned for comfort  * Consent was obtained     Time: 3:59 PM    Date of procedure: 9/3/2020  Procedure performed by:  Blossom Jasso MD  Mr. Jennifer Gutierres tolerated the procedure well with no complications. CRYSTALS, SYNOVIAL FLUID 6/13/19 [facility]  Component Value Flag Ref Range Units Status   FLUID TYPE(7) ASPIRATE      Final   Crystals, body fluid      Final   URIC ACID CRYSTALS PRESENT    Comment:   RARE. FEW WBCs     CULTURE, BODY FLUID W GRAM STAIN 6/13/19 [facility]  Component Value Ref Range & Units Status   Special Requests: NO SPECIAL REQUESTS    Preliminary   GRAM STAIN FEW WBC'S    Preliminary   GRAM STAIN NO ORGANISMS SEEN    Preliminary   Culture result: NO GROWTH 4 DAYS    Preliminary     MRI RIGHT KNEE WO CONT 7/17/19 [facility]  IMPRESSION:  1. Large right knee joint effusion and extensive synovial thickening/synovitis  throughout the knee. -High-grade chondral loss in the patella.  Small areas of erosions and large area  of bone marrow edema in the lateral femoral condyle and patella. -Thickening and signal abnormality of the intra-articular popliteal tendon.  -Constellation of findings suggestive of gout arthropathy in this patient with  history of gout. Differential also include other inflammatory arthropathy,  pseudogout and less likely infectious arthropathy/septic joint although cannot  be entirely excluded based on imaging. Recommend clinical correlation. -According to last office note 6/24/2019 joint aspiration was yellow fluid  nonpurulent. 2. No ligamentous injury. No meniscal tears. 3. Prepatellar bursitis. RADIOGRAPHS:   XR LEFT KNEE 8/22/20 [facility]  IMPRESSION:   No evidence of acute fracture or dislocation. Evidence for small joint effusion. Very mild degenerative changes.  -I have independently reviewed these images during this office visit. -Dr. Karron Najjar:  Three views - No fractures, no effusion, moderate joint space narrowing, no osteophytes present. Kellgren Peter grade 2     XR RIGHT KNEE 6/7/19 [facility]  IMPRESSION:  No evidence for acute fracture. Large joint effusion. Mild soft tissue swelling anteriorly  -I have independently reviewed these images during this office visit. -Dr. Karron Najjar:  Three views - No fractures, large effusion, mild joint space narrowing, no osteophytes present. Kellgren Peter grade 1. Condylar flattening    IMPRESSION:      ICD-10-CM ICD-9-CM    1. Gout of left knee, unspecified cause, unspecified chronicity  M10.9 274.9 indomethacin (INDOCIN) 50 mg capsule   2. Primary osteoarthritis of left knee  M17.12 715.16 betamethasone (Celestone Soluspan) 6 mg/mL injection      BETAMETHASONE ACETATE & SODIUM PHOSPHATE INJECTION 3 MG EA.      DRAIN/INJECT LARGE JOINT/BURSA      PROCEDURE AUTHORIZATION TO    3.  Chronic pain of left knee  M25.562 719.46 betamethasone (Celestone Soluspan) 6 mg/mL injection    G89.29 338.29 BETAMETHASONE ACETATE & SODIUM PHOSPHATE INJECTION 3 MG EA.      DRAIN/INJECT LARGE JOINT/BURSA      PROCEDURE AUTHORIZATION TO    4. Effusion of left knee  M25.462 719.06         PLAN:  Indocin Rx provided. He responded to Indocin for his prior gout episode but continued to take the medicine. Work note provided-- Return to work 9/8/20. OTC analgesics for pain. Consider visco supplementation if pain continues. After timeout and under sterile conditions, left knee aspirated 200 cc of cloudy and turbid blood tinged fluid. The fluid was discarded. After discussing treatment options, patient's left knee was injected with 4 cc Marcaine and 1/2 cc Celestone. There is no need for surgery at this time. He will follow up as needed.      Scribed by eLty Paredes ([address]) as dictated by Trish Brand MD

## 2020-11-12 DIAGNOSIS — N52.9 ERECTILE DYSFUNCTION, UNSPECIFIED ERECTILE DYSFUNCTION TYPE: ICD-10-CM

## 2020-11-12 RX ORDER — SILDENAFIL 50 MG/1
TABLET, FILM COATED ORAL
Qty: 30 TAB | Refills: 2 | Status: CANCELLED | OUTPATIENT
Start: 2020-11-12

## 2020-11-12 NOTE — TELEPHONE ENCOUNTER
Requested Prescriptions     Pending Prescriptions Disp Refills    sildenafil citrate (Viagra) 50 mg tablet 30 Tab 2     Sig: One tablet once daily 1 hour (range: 30 minutes to 4 hours) before sexual activity as needed

## 2021-03-04 ENCOUNTER — TELEPHONE (OUTPATIENT)
Dept: FAMILY MEDICINE CLINIC | Age: 41
End: 2021-03-04

## 2021-03-04 DIAGNOSIS — E78.2 MIXED HYPERLIPIDEMIA: ICD-10-CM

## 2021-03-04 DIAGNOSIS — I10 ESSENTIAL HYPERTENSION: Primary | ICD-10-CM

## 2021-03-04 DIAGNOSIS — Z11.59 ENCOUNTER FOR HEPATITIS C SCREENING TEST FOR LOW RISK PATIENT: ICD-10-CM

## 2021-03-04 DIAGNOSIS — N52.9 ERECTILE DYSFUNCTION, UNSPECIFIED ERECTILE DYSFUNCTION TYPE: ICD-10-CM

## 2021-03-04 RX ORDER — SILDENAFIL 50 MG/1
TABLET, FILM COATED ORAL
Qty: 30 TAB | Refills: 2 | Status: SHIPPED | OUTPATIENT
Start: 2021-03-04 | End: 2021-03-11 | Stop reason: SDUPTHER

## 2021-03-04 NOTE — TELEPHONE ENCOUNTER
Last seen 07/07/20  Next appt  Being made    Requested Prescriptions     Pending Prescriptions Disp Refills    sildenafil citrate (Viagra) 50 mg tablet 30 Tab 2     Sig: One tablet once daily 1 hour (range: 30 minutes to 4 hours) before sexual activity as needed

## 2021-03-08 ENCOUNTER — HOSPITAL ENCOUNTER (OUTPATIENT)
Dept: LAB | Age: 41
Discharge: HOME | End: 2021-03-08
Payer: MEDICAID

## 2021-03-08 DIAGNOSIS — E78.2 MIXED HYPERLIPIDEMIA: ICD-10-CM

## 2021-03-08 DIAGNOSIS — I10 ESSENTIAL HYPERTENSION: ICD-10-CM

## 2021-03-08 DIAGNOSIS — Z11.59 ENCOUNTER FOR HEPATITIS C SCREENING TEST FOR LOW RISK PATIENT: ICD-10-CM

## 2021-03-08 LAB
ALBUMIN SERPL-MCNC: 4 G/DL (ref 3.4–5)
ALBUMIN/GLOB SERPL: 1 {RATIO} (ref 0.8–1.7)
ALP SERPL-CCNC: 71 U/L (ref 45–117)
ALT SERPL-CCNC: 53 U/L (ref 16–61)
ANION GAP SERPL CALC-SCNC: 6 MMOL/L (ref 3–18)
AST SERPL-CCNC: 37 U/L (ref 10–38)
BASOPHILS # BLD: 0 K/UL (ref 0–0.1)
BASOPHILS NFR BLD: 0 % (ref 0–2)
BILIRUB SERPL-MCNC: 0.5 MG/DL (ref 0.2–1)
BUN SERPL-MCNC: 11 MG/DL (ref 7–18)
BUN/CREAT SERPL: 11 (ref 12–20)
CALCIUM SERPL-MCNC: 9.2 MG/DL (ref 8.5–10.1)
CHLORIDE SERPL-SCNC: 104 MMOL/L (ref 100–111)
CHOLEST SERPL-MCNC: 155 MG/DL
CO2 SERPL-SCNC: 31 MMOL/L (ref 21–32)
CREAT SERPL-MCNC: 1 MG/DL (ref 0.6–1.3)
DIFFERENTIAL METHOD BLD: ABNORMAL
EOSINOPHIL # BLD: 0.3 K/UL (ref 0–0.4)
EOSINOPHIL NFR BLD: 5 % (ref 0–5)
ERYTHROCYTE [DISTWIDTH] IN BLOOD BY AUTOMATED COUNT: 13.9 % (ref 11.6–14.5)
GLOBULIN SER CALC-MCNC: 4.1 G/DL (ref 2–4)
GLUCOSE SERPL-MCNC: 75 MG/DL (ref 74–99)
HCT VFR BLD AUTO: 44.9 % (ref 36–48)
HDLC SERPL-MCNC: 39 MG/DL (ref 40–60)
HDLC SERPL: 4 {RATIO} (ref 0–5)
HGB BLD-MCNC: 15.2 G/DL (ref 13–16)
LDLC SERPL CALC-MCNC: 82.4 MG/DL (ref 0–100)
LIPID PROFILE,FLP: ABNORMAL
LYMPHOCYTES # BLD: 2.1 K/UL (ref 0.9–3.6)
LYMPHOCYTES NFR BLD: 35 % (ref 21–52)
MCH RBC QN AUTO: 28.1 PG (ref 24–34)
MCHC RBC AUTO-ENTMCNC: 33.9 G/DL (ref 31–37)
MCV RBC AUTO: 83 FL (ref 74–97)
MONOCYTES # BLD: 0.7 K/UL (ref 0.05–1.2)
MONOCYTES NFR BLD: 11 % (ref 3–10)
NEUTS SEG # BLD: 2.9 K/UL (ref 1.8–8)
NEUTS SEG NFR BLD: 49 % (ref 40–73)
PLATELET # BLD AUTO: 289 K/UL (ref 135–420)
PMV BLD AUTO: 10.7 FL (ref 9.2–11.8)
POTASSIUM SERPL-SCNC: 4.2 MMOL/L (ref 3.5–5.5)
PROT SERPL-MCNC: 8.1 G/DL (ref 6.4–8.2)
RBC # BLD AUTO: 5.41 M/UL (ref 4.7–5.5)
SODIUM SERPL-SCNC: 141 MMOL/L (ref 136–145)
TRIGL SERPL-MCNC: 168 MG/DL (ref ?–150)
VLDLC SERPL CALC-MCNC: 33.6 MG/DL
WBC # BLD AUTO: 6 K/UL (ref 4.6–13.2)

## 2021-03-08 PROCEDURE — 36415 COLL VENOUS BLD VENIPUNCTURE: CPT

## 2021-03-08 PROCEDURE — 80061 LIPID PANEL: CPT

## 2021-03-08 PROCEDURE — 85025 COMPLETE CBC W/AUTO DIFF WBC: CPT

## 2021-03-08 PROCEDURE — 80053 COMPREHEN METABOLIC PANEL: CPT

## 2021-03-08 PROCEDURE — 86803 HEPATITIS C AB TEST: CPT

## 2021-03-09 LAB
HCV AB SER IA-ACNC: 0.08 INDEX
HCV AB SERPL QL IA: NEGATIVE
HCV COMMENT,HCGAC: NORMAL

## 2021-03-11 ENCOUNTER — OFFICE VISIT (OUTPATIENT)
Dept: FAMILY MEDICINE CLINIC | Age: 41
End: 2021-03-11
Payer: MEDICAID

## 2021-03-11 VITALS
WEIGHT: 315 LBS | HEART RATE: 83 BPM | SYSTOLIC BLOOD PRESSURE: 143 MMHG | RESPIRATION RATE: 16 BRPM | OXYGEN SATURATION: 96 % | BODY MASS INDEX: 42.66 KG/M2 | TEMPERATURE: 97.6 F | HEIGHT: 72 IN | DIASTOLIC BLOOD PRESSURE: 74 MMHG

## 2021-03-11 DIAGNOSIS — E66.01 OBESITY, MORBID (HCC): Primary | ICD-10-CM

## 2021-03-11 DIAGNOSIS — E78.2 MIXED HYPERLIPIDEMIA: ICD-10-CM

## 2021-03-11 DIAGNOSIS — N52.9 ERECTILE DYSFUNCTION, UNSPECIFIED ERECTILE DYSFUNCTION TYPE: ICD-10-CM

## 2021-03-11 DIAGNOSIS — I10 ESSENTIAL HYPERTENSION: ICD-10-CM

## 2021-03-11 PROCEDURE — 99214 OFFICE O/P EST MOD 30 MIN: CPT | Performed by: NURSE PRACTITIONER

## 2021-03-11 RX ORDER — SILDENAFIL 50 MG/1
TABLET, FILM COATED ORAL
Qty: 30 TAB | Refills: 2 | Status: SHIPPED | OUTPATIENT
Start: 2021-03-11 | End: 2021-07-28 | Stop reason: SDUPTHER

## 2021-03-11 RX ORDER — AMLODIPINE BESYLATE 5 MG/1
5 TABLET ORAL DAILY
Qty: 90 TAB | Refills: 0 | Status: SHIPPED | OUTPATIENT
Start: 2021-03-11 | End: 2021-05-03 | Stop reason: SDUPTHER

## 2021-03-11 NOTE — PROGRESS NOTES
Elizabet Null presents today for   Chief Complaint   Patient presents with    Hypertension     follow-up       Is someone accompanying this pt? no    Is the patient using any DME equipment during OV? no    Depression Screening:  3 most recent PHQ Screens 3/11/2021   PHQ Not Done -   Little interest or pleasure in doing things Not at all   Feeling down, depressed, irritable, or hopeless Not at all   Total Score PHQ 2 0       Learning Assessment:  Learning Assessment 9/10/2019   PRIMARY LEARNER Patient   HIGHEST LEVEL OF EDUCATION - PRIMARY LEARNER  GRADUATED HIGH SCHOOL OR GED   BARRIERS PRIMARY LEARNER NONE   CO-LEARNER CAREGIVER No   PRIMARY LANGUAGE ENGLISH   LEARNER PREFERENCE PRIMARY DEMONSTRATION   ANSWERED BY patient   RELATIONSHIP SELF       Health Maintenance reviewed and discussed and ordered per Provider. Health Maintenance Due   Topic Date Due    Pneumococcal 0-64 years (1 of 1 - PPSV23) Never done    COVID-19 Vaccine (1 of 2) Never done   . Coordination of Care:  1. Have you been to the ER, urgent care clinic since your last visit? Hospitalized since your last visit? no    2. Have you seen or consulted any other health care providers outside of the [address] [place] [address] since your last visit? Include any pap smears or colon screening.  no

## 2021-03-11 NOTE — PROGRESS NOTES
Maria Teresa Valladares is a 41 y.o. male presenting today for Hypertension (follow-up)    HPI:  Maria Teresa Valladares presents to the office today for routine follow-up care. He carries a medical diagnosis for hypertension, hyperlipidemia and gout. He feels well today and admits he has gained over 30 lbs. He notes he stopped drinking ETOH approximately 2 weeks ago. He notes the ETOH called his stomach bloating. Hypertension-his BP is mildly elevated today. He reports he is compliant with taking his medication as prescribed. He is currently taking amlodipine and lisinopril daily. He is negative for any chest pain or palpitation. He had labs prior to today's visit and the results were reviewed. Hyperlipidemia-his cholesterol was 155, HDL 39 and LDL 82.4. He is prescribed atorvastatin and denies any myalgia. ROS    ROS:  History obtained from the patient intake forms which are reviewed with the patient  · General: negative for - chills, fever, weight changes or malaise  · HEENT: no sore throat, nasal congestion, vision problems or ear problems  · Respiratory: no cough, shortness of breath, or wheezing  · Cardiovascular: no chest pain, palpitations, or dyspnea on exertion  · Gastrointestinal: no abdominal pain, N/V, change in bowel habits, or black or bloody stools  · Musculoskeletal: no back pain, joint pain, joint stiffness, muscle pain or muscle weakness  · Neurological: no numbness, tingling, headache or dizziness  · Endo:  No polyuria or polydipsia. · : no hematuria, dysuria, frequency, hesitancy, or nocturia.     · Psychological: negative for - anxiety, depression, sleep disturbances, suicidal or homicidal ideations    No Known Allergies    Current Outpatient Medications   Medication Sig Dispense Refill    sildenafil citrate (Viagra) 50 mg tablet One tablet once daily 1 hour (range: 30 minutes to 4 hours) before sexual activity as needed 30 Tab 2    amLODIPine (NORVASC) 5 mg tablet Take 1 Tab by mouth daily. 90 Tab 0    lisinopriL (PRINIVIL, ZESTRIL) 20 mg tablet Take 1 Tab by mouth daily. 90 Tab 0    atorvastatin (LIPITOR) 20 mg tablet Take 1 Tab by mouth daily. 90 Tab 0    allopurinoL (ZYLOPRIM) 100 mg tablet Take 3 Tabs by mouth daily. 270 Tab 0    Blood Pressure Test Kit-Large kit 1 Kit by Does Not Apply route daily. 1 Kit 0    cpap machine kit by Does Not Apply route.  1 Kit 0       Past Medical History:   Diagnosis Date    Elevated uric acid in blood 03/05/2015    8.6 mg/dL    Gout     Hyperlipidemia     Hypertension     Medical non-compliance     Renal insufficiency 04/06/2011    BUN/sCr: 14/1.4, GFR >60    Right knee pain 09/20/2010    Plain Film NEG    Right knee pain 04/10/2015    Plain Film NEG       Past Surgical History:   Procedure Laterality Date    HX CYST REMOVAL      HX TONSILLECTOMY         Social History     Socioeconomic History    Marital status: SINGLE     Spouse name: Not on file    Number of children: Not on file    Years of education: Not on file    Highest education level: Not on file   Occupational History    Not on file   Social Needs    Financial resource strain: Not on file    Food insecurity     Worry: Not on file     Inability: Not on file    Transportation needs     Medical: Not on file     Non-medical: Not on file   Tobacco Use    Smoking status: Never Smoker    Smokeless tobacco: Never Used   Substance and Sexual Activity    Alcohol use: Yes     Comment: socially    Drug use: No    Sexual activity: Yes     Partners: Female     Birth control/protection: Condom   Lifestyle    Physical activity     Days per week: Not on file     Minutes per session: Not on file    Stress: Not on file   Relationships    Social connections     Talks on phone: Not on file     Gets together: Not on file     Attends Jew service: Not on file     Active member of club or organization: Not on file     Attends meetings of clubs or organizations: Not on file Relationship status: Not on file    Intimate partner violence     Fear of current or ex partner: Not on file     Emotionally abused: Not on file     Physically abused: Not on file     Forced sexual activity: Not on file   Other Topics Concern    Not on file   Social History Narrative    Not on file         Vitals:    03/11/21 1007   BP: (!) 143/74   Pulse: 83   Resp: 16   Temp: 97.6 °F (36.4 °C)   TempSrc: Oral   SpO2: 96%   Weight: 333 lb (151 kg)   Height: 6' (1.829 m)   PainSc:   0 - No pain       Physical Exam  Vitals signs and nursing note reviewed. Constitutional:       Appearance: He is obese. Neck:      Musculoskeletal: Neck supple. Cardiovascular:      Rate and Rhythm: Normal rate and regular rhythm. Pulses: Normal pulses. Heart sounds: Normal heart sounds. Pulmonary:      Effort: Pulmonary effort is normal.      Breath sounds: Normal breath sounds. Abdominal:      General: Bowel sounds are normal.      Tenderness: There is no abdominal tenderness. Lymphadenopathy:      Cervical: No cervical adenopathy. Skin:     General: Skin is warm and dry. Neurological:      General: No focal deficit present. Mental Status: He is alert and oriented to person, place, and time. Hospital Outpatient Visit on 03/08/2021   Component Date Value Ref Range Status    Hepatitis C virus Ab 03/08/2021 0.08  <0.80 Index Final    Hep C virus Ab Interp. 03/08/2021 Negative  NEG   Final    Hep C  virus Ab comment 03/08/2021        Final    Comment: Index <0.80. ......................... Say Salter Negative  Index > or = to 0.80 and <1.00. .... Say Salter Say Salter Equivocal  Index >1.00. ......................... Say Salter Positive          For Equivocal or Positive results, confirmation with Hepatitis C RNA by PCR or bDNA is suggested.       WBC 03/08/2021 6.0  4.6 - 13.2 K/uL Final    RBC 03/08/2021 5.41  4.70 - 5.50 M/uL Final    HGB 03/08/2021 15.2  13.0 - 16.0 g/dL Final    HCT 03/08/2021 44.9  36.0 - 48.0 % Final    MCV 03/08/2021 83.0  74.0 - 97.0 FL Final    MCH 03/08/2021 28.1  24.0 - 34.0 PG Final    MCHC 03/08/2021 33.9  31.0 - 37.0 g/dL Final    RDW 03/08/2021 13.9  11.6 - 14.5 % Final    PLATELET 60/18/4029 212  135 - 420 K/uL Final    MPV 03/08/2021 10.7  9.2 - 11.8 FL Final    NEUTROPHILS 03/08/2021 49  40 - 73 % Final    LYMPHOCYTES 03/08/2021 35  21 - 52 % Final    MONOCYTES 03/08/2021 11* 3 - 10 % Final    EOSINOPHILS 03/08/2021 5  0 - 5 % Final    BASOPHILS 03/08/2021 0  0 - 2 % Final    ABS. NEUTROPHILS 03/08/2021 2.9  1.8 - 8.0 K/UL Final    ABS. LYMPHOCYTES 03/08/2021 2.1  0.9 - 3.6 K/UL Final    ABS. MONOCYTES 03/08/2021 0.7  0.05 - 1.2 K/UL Final    ABS. EOSINOPHILS 03/08/2021 0.3  0.0 - 0.4 K/UL Final    ABS. BASOPHILS 03/08/2021 0.0  0.0 - 0.1 K/UL Final    DF 03/08/2021 AUTOMATED    Final    LIPID PROFILE 03/08/2021        Final    Cholesterol, total 03/08/2021 155  <200 MG/DL Final    Triglyceride 03/08/2021 168* <150 MG/DL Final    Comment: The drugs N-acetylcysteine (NAC) and  Metamiszole have been found to cause falsely  low results in this chemical assay. Please  be sure to submit blood samples obtained  BEFORE administration of either of these  drugs to assure correct results.       HDL Cholesterol 03/08/2021 39* 40 - 60 MG/DL Final    LDL, calculated 03/08/2021 82.4  0 - 100 MG/DL Final    VLDL, calculated 03/08/2021 33.6  MG/DL Final    CHOL/HDL Ratio 03/08/2021 4.0  0 - 5.0   Final    Sodium 03/08/2021 141  136 - 145 mmol/L Final    Potassium 03/08/2021 4.2  3.5 - 5.5 mmol/L Final    Chloride 03/08/2021 104  100 - 111 mmol/L Final    CO2 03/08/2021 31  21 - 32 mmol/L Final    Anion gap 03/08/2021 6  3.0 - 18 mmol/L Final    Glucose 03/08/2021 75  74 - 99 mg/dL Final    BUN 03/08/2021 11  7.0 - 18 MG/DL Final    Creatinine 03/08/2021 1.00  0.6 - 1.3 MG/DL Final    BUN/Creatinine ratio 03/08/2021 11* 12 - 20   Final    GFR est AA 03/08/2021 >60  >60 ml/min/1.73m2 Final    GFR est non-AA 03/08/2021 >60  >60 ml/min/1.73m2 Final    Comment: (NOTE)  Estimated GFR is calculated using the Modification of Diet in Renal   Disease (MDRD) Study equation, reported for both  Americans   (GFRAA) and non- Americans (GFRNA), and normalized to 1.73m2   body surface area. The physician must decide which value applies to   the patient. The MDRD study equation should only be used in   individuals age 25 or older. It has not been validated for the   following: pregnant women, patients with serious comorbid conditions,   or on certain medications, or persons with extremes of body size,   muscle mass, or nutritional status.  Calcium 03/08/2021 9.2  8.5 - 10.1 MG/DL Final    Bilirubin, total 03/08/2021 0.5  0.2 - 1.0 MG/DL Final    ALT (SGPT) 03/08/2021 53  16 - 61 U/L Final    AST (SGOT) 03/08/2021 37  10 - 38 U/L Final    Alk. phosphatase 03/08/2021 71  45 - 117 U/L Final    Protein, total 03/08/2021 8.1  6.4 - 8.2 g/dL Final    Albumin 03/08/2021 4.0  3.4 - 5.0 g/dL Final    Globulin 03/08/2021 4.1* 2.0 - 4.0 g/dL Final    A-G Ratio 03/08/2021 1.0  0.8 - 1.7   Final       .No results found for any visits on 03/11/21. Assessment / Plan:      ICD-10-CM ICD-9-CM    1. Obesity, morbid ([facility] [address]  E66.01 278.01    2. Erectile dysfunction, unspecified erectile dysfunction type  N52.9 607.84 sildenafil citrate (Viagra) 50 mg tablet   3. Essential hypertension  I10 401.9 amLODIPine (NORVASC) 5 mg tablet   4. Mixed hyperlipidemia  E78.2 272.2    Refill amlodipine 5 mg daily. History of erectile dysfunction-prescription for Viagra given  Hyperlipidemia-continue lifestyle modifications. No change to current treatment plan      Follow-up and Dispositions    · Return in about 4 months (around 7/11/2021). I asked the patient if he  had any questions and answered his  questions.   The patient stated that he understands the treatment plan and agrees with the treatment plan    This document was created with a voice activated dictation system and may contain transcription errors.

## 2021-05-03 DIAGNOSIS — E78.2 MIXED HYPERLIPIDEMIA: ICD-10-CM

## 2021-05-03 DIAGNOSIS — I10 ESSENTIAL HYPERTENSION: ICD-10-CM

## 2021-05-03 DIAGNOSIS — Z87.39 HISTORY OF GOUT: ICD-10-CM

## 2021-05-03 NOTE — TELEPHONE ENCOUNTER
Requested Prescriptions     Pending Prescriptions Disp Refills    lisinopriL (PRINIVIL, ZESTRIL) 20 mg tablet 90 Tab 0     Sig: Take 1 Tab by mouth daily.  amLODIPine (NORVASC) 5 mg tablet 90 Tab 0     Sig: Take 1 Tab by mouth daily.  allopurinoL (ZYLOPRIM) 100 mg tablet 270 Tab 0     Sig: Take 3 Tabs by mouth daily.  atorvastatin (LIPITOR) 20 mg tablet 90 Tab 0     Sig: Take 1 Tab by mouth daily.

## 2021-05-06 RX ORDER — AMLODIPINE BESYLATE 5 MG/1
5 TABLET ORAL DAILY
Qty: 90 TAB | Refills: 0 | Status: SHIPPED
Start: 2021-05-06 | End: 2021-07-06 | Stop reason: DRUGHIGH

## 2021-05-06 RX ORDER — ALLOPURINOL 100 MG/1
300 TABLET ORAL DAILY
Qty: 270 TAB | Refills: 0 | Status: SHIPPED | OUTPATIENT
Start: 2021-05-06 | End: 2021-08-17 | Stop reason: SDUPTHER

## 2021-05-06 RX ORDER — ATORVASTATIN CALCIUM 20 MG/1
20 TABLET, FILM COATED ORAL DAILY
Qty: 90 TAB | Refills: 0 | Status: SHIPPED | OUTPATIENT
Start: 2021-05-06 | End: 2021-08-17 | Stop reason: SDUPTHER

## 2021-05-06 RX ORDER — LISINOPRIL 20 MG/1
20 TABLET ORAL DAILY
Qty: 90 TAB | Refills: 0 | Status: SHIPPED
Start: 2021-05-06 | End: 2021-07-06 | Stop reason: CLARIF

## 2021-06-09 DIAGNOSIS — M10.9 GOUT OF LEFT KNEE, UNSPECIFIED CAUSE, UNSPECIFIED CHRONICITY: ICD-10-CM

## 2021-06-09 NOTE — TELEPHONE ENCOUNTER
Last seen 03/11/21  Next appt  07/06/21    Requested Prescriptions     Pending Prescriptions Disp Refills    indomethacin (INDOCIN) 50 mg capsule 60 Capsule 2     Sig: Take 1 Capsule by mouth two (2) times daily as needed for Gout or Pain (Take for 4 days) for up to 90 days.

## 2021-06-14 RX ORDER — INDOMETHACIN 50 MG/1
50 CAPSULE ORAL
Qty: 60 CAPSULE | Refills: 2 | OUTPATIENT
Start: 2021-06-14 | End: 2021-09-12

## 2021-06-15 ENCOUNTER — TELEPHONE (OUTPATIENT)
Dept: FAMILY MEDICINE CLINIC | Age: 41
End: 2021-06-15

## 2021-06-15 NOTE — TELEPHONE ENCOUNTER
TC to patient he was notified that his orthopedic provider prescribed medication call their office to receive prescription. Patient verbalized his understanding.

## 2021-06-15 NOTE — TELEPHONE ENCOUNTER
Patient is requesting Indomethacin 50 mg capsule.  Take one capsule by mouth twice a day as needed for gout or pain (take for four days)     90 days supply

## 2021-06-17 ENCOUNTER — HOSPITAL ENCOUNTER (EMERGENCY)
Age: 41
Discharge: HOME | End: 2021-06-17
Attending: EMERGENCY MEDICINE
Payer: MEDICAID

## 2021-06-17 VITALS
HEART RATE: 94 BPM | OXYGEN SATURATION: 94 % | TEMPERATURE: 98.6 F | DIASTOLIC BLOOD PRESSURE: 99 MMHG | SYSTOLIC BLOOD PRESSURE: 146 MMHG | HEIGHT: 72 IN | WEIGHT: 305 LBS | RESPIRATION RATE: 18 BRPM | BODY MASS INDEX: 41.31 KG/M2

## 2021-06-17 DIAGNOSIS — M10.9 GOUTY ARTHRITIS OF LEFT GREAT TOE: Primary | ICD-10-CM

## 2021-06-17 PROCEDURE — 74011250637 HC RX REV CODE- 250/637: Performed by: EMERGENCY MEDICINE

## 2021-06-17 PROCEDURE — 99283 EMERGENCY DEPT VISIT LOW MDM: CPT

## 2021-06-17 PROCEDURE — 74011636637 HC RX REV CODE- 636/637: Performed by: EMERGENCY MEDICINE

## 2021-06-17 RX ORDER — HYDROCODONE BITARTRATE AND ACETAMINOPHEN 5; 325 MG/1; MG/1
1 TABLET ORAL
Status: COMPLETED | OUTPATIENT
Start: 2021-06-17 | End: 2021-06-17

## 2021-06-17 RX ORDER — PREDNISONE 20 MG/1
60 TABLET ORAL
Status: COMPLETED | OUTPATIENT
Start: 2021-06-17 | End: 2021-06-17

## 2021-06-17 RX ORDER — INDOMETHACIN 25 MG/1
50 CAPSULE ORAL
Status: DISCONTINUED | OUTPATIENT
Start: 2021-06-17 | End: 2021-06-18 | Stop reason: HOSPADM

## 2021-06-17 RX ORDER — PREDNISONE 20 MG/1
40 TABLET ORAL DAILY
Qty: 8 TABLET | Refills: 0 | Status: SHIPPED | OUTPATIENT
Start: 2021-06-17 | End: 2021-06-21

## 2021-06-17 RX ORDER — INDOMETHACIN 50 MG/1
50 CAPSULE ORAL 3 TIMES DAILY
Qty: 15 CAPSULE | Refills: 0 | Status: SHIPPED | OUTPATIENT
Start: 2021-06-17 | End: 2021-06-22

## 2021-06-17 RX ADMIN — PREDNISONE 60 MG: 20 TABLET ORAL at 21:18

## 2021-06-17 RX ADMIN — HYDROCODONE BITARTRATE AND ACETAMINOPHEN 1 TABLET: 5; 325 TABLET ORAL at 21:18

## 2021-06-18 NOTE — ED NOTES
I have reviewed discharge instructions with the patient. The patient verbalized understanding. Patient awaiting medication from pharmacy to be delivered. Explained to patient, patient verbalized understanding.

## 2021-06-18 NOTE — ED TRIAGE NOTES
Patient presents to the ED with complaints of what the patient calls a flare out of gout on his left big toe.

## 2021-06-18 NOTE — ED PROVIDER NOTES
[facility]  EMERGENCY DEPARTMENT HISTORY AND PHYSICAL EXAM       Date: 6/17/2021   Patient Name: [facility]   YOB: 1980  Medical Record Number: [id number]    HISTORY OF PRESENTING ILLNESS:     [facility] is a 41 y.o. male presenting with the noted PMH to the ED c/o pain to his left great toe for the past 2 days. Denies any falls or injuries. Denies pain elsewhere other than to his left great toe and on the top of his foot. He states this is very similar to gout flares that he has had in the past.  He says that indomethacin has worked well for him in the past denies any other complaint. Has seen Ortho in the past for this. Rest of complete systems reviewed and negative    Primary Care Provider: Rebekah Jordan NP   Specialist:    Past Medical History:   Past Medical History:   Diagnosis Date    Elevated uric acid in blood 03/05/2015    8.6 mg/dL    Gout     Hyperlipidemia     Hypertension     Medical non-compliance     Renal insufficiency 04/06/2011    BUN/sCr: 14/1.4, GFR >60    Right knee pain 09/20/2010    Plain Film NEG    Right knee pain 04/10/2015    Plain Film NEG        Past Surgical History:   Past Surgical History:   Procedure Laterality Date    HX CYST REMOVAL      HX TONSILLECTOMY          Social History:   Social History     Tobacco Use    Smoking status: Never Smoker    Smokeless tobacco: Never Used   Substance Use Topics    Alcohol use: Yes     Comment: socially    Drug use: No        Allergies:   No Known Allergies     REVIEW OF SYSTEMS:  Review of Systems   Musculoskeletal: Positive for arthralgias and myalgias. All other systems reviewed and are negative. PHYSICAL EXAM:  Vitals:    06/17/21 2033   BP: (!) 146/99   Pulse: 94   Resp: 18   Temp: 98.6 °F (37 °C)   SpO2: 94%   Weight: 138.3 kg (305 lb)   Height: 6' (1.829 m)       Physical Exam  Vitals and nursing note reviewed.    Constitutional:       General: He is not in acute distress. Appearance: Normal appearance. HENT:      Head: Normocephalic. Mouth/Throat:      Pharynx: Oropharynx is clear. Eyes:      Conjunctiva/sclera: Conjunctivae normal.   Cardiovascular:      Rate and Rhythm: Normal rate. Pulses: Normal pulses. Pulmonary:      Effort: Pulmonary effort is normal.   Abdominal:      General: Abdomen is flat. Musculoskeletal:      Cervical back: Neck supple. Comments: Left great toe with swelling as well as some to the dorsum of the foot, no significant erythema or wounds. Neurovascularly intact. Pain with light palpation of great toe   Skin:     General: Skin is warm and dry. Neurological:      Mental Status: He is alert. Mental status is at baseline. Medications   HYDROcodone-acetaminophen (NORCO) 5-325 mg per tablet 1 Tablet (has no administration in time range)   indomethacin (INDOCIN) capsule 50 mg (has no administration in time range)   predniSONE (DELTASONE) tablet 60 mg (has no administration in time range)       RESULTS:    Labs -   Labs Reviewed - No data to display    Radiologic Studies -  No results found. MEDICAL DECISION MAKING  57-year-old male who is here for left great toe pain for the past few days consistent with previous gout flares. Clinically do suspect gout. No trauma or injuries to suspect fracture, no signs of infection and no swelling to the rest of the leg to suggest DVT. Neurovascularly intact otherwise. Imaging not indicated. States that indomethacin has worked for him in the past and will treat with indomethacin and prednisone taper, pain medicine given here as well. He will follow up with his outpatient team and return to the ED with worsening symptoms      Patient has no new complaints, changes, or physical findings. Results were reviewed with the patient. Pt's questions and concerns were addressed.  Care plan was outlined, including follow-up with PCP/specialist and return precautions were discussed. Patient is felt to be stable for discharge at this time. Diagnosis   Clinical Impression:   1. Gouty arthritis of left great toe           Follow-up Information     Follow up With Specialties Details Why Contact Info    Ortho    Follow up as needed          Current Discharge Medication List      START taking these medications    Details   indomethacin (INDOCIN) 50 mg capsule Take 1 Capsule by mouth three (3) times daily for 5 days. Qty: 15 Capsule, Refills: 0  Start date: 6/17/2021, End date: 6/22/2021      predniSONE (DELTASONE) 20 mg tablet Take 40 mg by mouth daily for 4 days. With Breakfast  Qty: 8 Tablet, Refills: 0  Start date: 6/17/2021, End date: 6/21/2021             Discharged in stable and improved condition. This chart was completed using Dragon, a dictation transcription service. Errors may have resulted from using this device.

## 2021-07-06 ENCOUNTER — OFFICE VISIT (OUTPATIENT)
Dept: FAMILY MEDICINE CLINIC | Age: 41
End: 2021-07-06
Payer: MEDICAID

## 2021-07-06 VITALS
OXYGEN SATURATION: 95 % | HEIGHT: 72 IN | WEIGHT: 315 LBS | RESPIRATION RATE: 17 BRPM | HEART RATE: 75 BPM | SYSTOLIC BLOOD PRESSURE: 158 MMHG | TEMPERATURE: 97.3 F | DIASTOLIC BLOOD PRESSURE: 94 MMHG | BODY MASS INDEX: 42.66 KG/M2

## 2021-07-06 DIAGNOSIS — E66.01 OBESITY, MORBID (HCC): ICD-10-CM

## 2021-07-06 DIAGNOSIS — I10 ESSENTIAL HYPERTENSION: Primary | ICD-10-CM

## 2021-07-06 PROCEDURE — 99213 OFFICE O/P EST LOW 20 MIN: CPT | Performed by: NURSE PRACTITIONER

## 2021-07-06 RX ORDER — AMLODIPINE BESYLATE 10 MG/1
10 TABLET ORAL DAILY
Qty: 90 TABLET | Refills: 0 | Status: SHIPPED | OUTPATIENT
Start: 2021-07-06 | End: 2021-08-17 | Stop reason: SDUPTHER

## 2021-07-06 RX ORDER — LISINOPRIL AND HYDROCHLOROTHIAZIDE 12.5; 2 MG/1; MG/1
1 TABLET ORAL DAILY
Qty: 90 TABLET | Refills: 0 | Status: SHIPPED | OUTPATIENT
Start: 2021-07-06 | End: 2021-08-17 | Stop reason: SDUPTHER

## 2021-07-06 NOTE — PROGRESS NOTES
Bandar Gamboa is a 35 y.o. male presenting today for Hypertension (follow-up)  . Chief Complaint   Patient presents with    Hypertension     follow-up       HPI:  Bandar Gamboa presents to the office today for hypertension follow-up care. His was asked to follow-up today for hypertension. He was seen in the office on 6/17/2021 and his BP was elevated. His BP today is 157/79 and repeat 158/94. Patient does not believe BP is that elevated. She reports her blood pressure is controlled in her home environment. He denies any chest pain, palpitation, headache or dizziness    ROS    ROS:  History obtained from the patient intake forms which are reviewed with the patient  · General: negative for - chills, fever, weight changes or malaise  · HEENT: no sore throat, nasal congestion, vision problems or ear problems  · Respiratory: no cough, shortness of breath, or wheezing  · Cardiovascular: no chest pain, palpitations, or dyspnea on exertion  · Gastrointestinal: no abdominal pain, N/V, change in bowel habits, or black or bloody stools  · Musculoskeletal: no back pain, joint pain, joint stiffness, muscle pain or muscle weakness  · Neurological: no numbness, tingling, headache or dizziness  · Endo:  No polyuria or polydipsia. · : no hematuria, dysuria, frequency, hesitancy, or nocturia.     · Psychological: negative for - anxiety, depression, sleep disturbances, suicidal or homicidal ideations    No Known Allergies    PHQ Screening   3 most recent PHQ Screens 7/6/2021   PHQ Not Done -   Little interest or pleasure in doing things Not at all   Feeling down, depressed, irritable, or hopeless Not at all   Total Score PHQ 2 0       History  Past Medical History:   Diagnosis Date    Elevated uric acid in blood 03/05/2015    8.6 mg/dL    Gout     Hyperlipidemia     Hypertension     Medical non-compliance     Renal insufficiency 04/06/2011    BUN/sCr: 14/1.4, GFR >60    Right knee pain 09/20/2010 Plain Film NEG    Right knee pain 04/10/2015    Plain Film NEG       Past Surgical History:   Procedure Laterality Date    HX CYST REMOVAL      HX TONSILLECTOMY         Social History     Socioeconomic History    Marital status: SINGLE     Spouse name: Not on file    Number of children: Not on file    Years of education: Not on file    Highest education level: Not on file   Occupational History    Not on file   Tobacco Use    Smoking status: Never Smoker    Smokeless tobacco: Never Used   Substance and Sexual Activity    Alcohol use: Yes     Comment: socially    Drug use: No    Sexual activity: Yes     Partners: Female     Birth control/protection: Condom   Other Topics Concern    Not on file   Social History Narrative    Not on file     Social Determinants of Health     Financial Resource Strain:     Difficulty of Paying Living Expenses:    Food Insecurity:     Worried About Running Out of Food in the Last Year:     Ran Out of Food in the Last Year:    Transportation Needs:     Lack of Transportation (Medical):  Lack of Transportation (Non-Medical):    Physical Activity:     Days of Exercise per Week:     Minutes of Exercise per Session:    Stress:     Feeling of Stress :    Social Connections:     Frequency of Communication with Friends and Family:     Frequency of Social Gatherings with Friends and Family:     Attends Yazdanism Services:     Active Member of Clubs or Organizations:     Attends Club or Organization Meetings:     Marital Status:    Intimate Partner Violence:     Fear of Current or Ex-Partner:     Emotionally Abused:     Physically Abused:     Sexually Abused:        Current Outpatient Medications   Medication Sig Dispense Refill    amLODIPine (NORVASC) 10 mg tablet Take 1 Tablet by mouth daily. 90 Tablet 0    lisinopril-hydroCHLOROthiazide (PRINZIDE, ZESTORETIC) 20-12.5 mg per tablet Take 1 Tablet by mouth daily.  90 Tablet 0    allopurinoL (ZYLOPRIM) 100 mg tablet Take 3 Tabs by mouth daily. 270 Tab 0    atorvastatin (LIPITOR) 20 mg tablet Take 1 Tab by mouth daily. 90 Tab 0    sildenafil citrate (Viagra) 50 mg tablet One tablet once daily 1 hour (range: 30 minutes to 4 hours) before sexual activity as needed 30 Tab 2    Blood Pressure Test Kit-Large kit 1 Kit by Does Not Apply route daily. 1 Kit 0    cpap machine kit by Does Not Apply route. 1 Kit 0         Vitals:    07/06/21 0959 07/06/21 1036   BP: (!) 157/79 (!) 158/94   Pulse: 75    Resp: 17    Temp: 97.3 °F (36.3 °C)    TempSrc: Oral    SpO2: 95%    Weight: 342 lb 12.8 oz (155.5 kg)    Height: 6' (1.829 m)    PainSc:   0 - No pain        Physical Exam  Vitals and nursing note reviewed. Constitutional:       Appearance: He is obese. Cardiovascular:      Rate and Rhythm: Normal rate and regular rhythm. Pulses: Normal pulses. Heart sounds: Normal heart sounds. Pulmonary:      Effort: Pulmonary effort is normal.      Breath sounds: Normal breath sounds. Skin:     General: Skin is warm and dry. Neurological:      Mental Status: He is alert. No visits with results within 3 Month(s) from this visit. Latest known visit with results is:   Hospital Outpatient Visit on 03/08/2021   Component Date Value Ref Range Status    Hepatitis C virus Ab 03/08/2021 0.08  <0.80 Index Final    Hep C virus Ab Interp. 03/08/2021 Negative  NEG   Final    Hep C  virus Ab comment 03/08/2021        Final    Comment: Index <0.80. ......................... Clement Fly Negative  Index > or = to 0.80 and <1.00. .... Clement Fly Clement Fly Equivocal  Index >1.00. ......................... Clement Fly Positive          For Equivocal or Positive results, confirmation with Hepatitis C RNA by PCR or bDNA is suggested.       WBC 03/08/2021 6.0  4.6 - 13.2 K/uL Final    RBC 03/08/2021 5.41  4.70 - 5.50 M/uL Final    HGB 03/08/2021 15.2  13.0 - 16.0 g/dL Final    HCT 03/08/2021 44.9  36.0 - 48.0 % Final    MCV 03/08/2021 83.0  74.0 - 97.0 FL Final    MCH 03/08/2021 28.1  24.0 - 34.0 PG Final    MCHC 03/08/2021 33.9  31.0 - 37.0 g/dL Final    RDW 03/08/2021 13.9  11.6 - 14.5 % Final    PLATELET 07/85/2636 911  135 - 420 K/uL Final    MPV 03/08/2021 10.7  9.2 - 11.8 FL Final    NEUTROPHILS 03/08/2021 49  40 - 73 % Final    LYMPHOCYTES 03/08/2021 35  21 - 52 % Final    MONOCYTES 03/08/2021 11* 3 - 10 % Final    EOSINOPHILS 03/08/2021 5  0 - 5 % Final    BASOPHILS 03/08/2021 0  0 - 2 % Final    ABS. NEUTROPHILS 03/08/2021 2.9  1.8 - 8.0 K/UL Final    ABS. LYMPHOCYTES 03/08/2021 2.1  0.9 - 3.6 K/UL Final    ABS. MONOCYTES 03/08/2021 0.7  0.05 - 1.2 K/UL Final    ABS. EOSINOPHILS 03/08/2021 0.3  0.0 - 0.4 K/UL Final    ABS. BASOPHILS 03/08/2021 0.0  0.0 - 0.1 K/UL Final    DF 03/08/2021 AUTOMATED    Final    LIPID PROFILE 03/08/2021        Final    Cholesterol, total 03/08/2021 155  <200 MG/DL Final    Triglyceride 03/08/2021 168* <150 MG/DL Final    Comment: The drugs N-acetylcysteine (NAC) and  Metamiszole have been found to cause falsely  low results in this chemical assay. Please  be sure to submit blood samples obtained  BEFORE administration of either of these  drugs to assure correct results.       HDL Cholesterol 03/08/2021 39* 40 - 60 MG/DL Final    LDL, calculated 03/08/2021 82.4  0 - 100 MG/DL Final    VLDL, calculated 03/08/2021 33.6  MG/DL Final    CHOL/HDL Ratio 03/08/2021 4.0  0 - 5.0   Final    Sodium 03/08/2021 141  136 - 145 mmol/L Final    Potassium 03/08/2021 4.2  3.5 - 5.5 mmol/L Final    Chloride 03/08/2021 104  100 - 111 mmol/L Final    CO2 03/08/2021 31  21 - 32 mmol/L Final    Anion gap 03/08/2021 6  3.0 - 18 mmol/L Final    Glucose 03/08/2021 75  74 - 99 mg/dL Final    BUN 03/08/2021 11  7.0 - 18 MG/DL Final    Creatinine 03/08/2021 1.00  0.6 - 1.3 MG/DL Final    BUN/Creatinine ratio 03/08/2021 11* 12 - 20   Final    GFR est AA 03/08/2021 >60  >60 ml/min/1.73m2 Final    GFR est non-AA 03/08/2021 >60  >60 ml/min/1.73m2 Final    Comment: (NOTE)  Estimated GFR is calculated using the Modification of Diet in Renal   Disease (MDRD) Study equation, reported for both  Americans   (GFRAA) and non- Americans (GFRNA), and normalized to 1.73m2   body surface area. The physician must decide which value applies to   the patient. The MDRD study equation should only be used in   individuals age 25 or older. It has not been validated for the   following: pregnant women, patients with serious comorbid conditions,   or on certain medications, or persons with extremes of body size,   muscle mass, or nutritional status.  Calcium 03/08/2021 9.2  8.5 - 10.1 MG/DL Final    Bilirubin, total 03/08/2021 0.5  0.2 - 1.0 MG/DL Final    ALT (SGPT) 03/08/2021 53  16 - 61 U/L Final    AST (SGOT) 03/08/2021 37  10 - 38 U/L Final    Alk. phosphatase 03/08/2021 71  45 - 117 U/L Final    Protein, total 03/08/2021 8.1  6.4 - 8.2 g/dL Final    Albumin 03/08/2021 4.0  3.4 - 5.0 g/dL Final    Globulin 03/08/2021 4.1* 2.0 - 4.0 g/dL Final    A-G Ratio 03/08/2021 1.0  0.8 - 1.7   Final       No results found for any visits on 07/06/21. Patient Care Team:  Patient Care Team:  Carry SAMANTHA Arredondo as PCP - General (Nurse Practitioner)  Carry SAMANTHA Arredondo as PCP - [facility]      Assessment / Plan:      ICD-10-CM ICD-9-CM    1. Essential hypertension  I10 401.9 amLODIPine (NORVASC) 10 mg tablet      lisinopril-hydroCHLOROthiazide (PRINZIDE, ZESTORETIC) 20-12.5 mg per tablet   2. Obesity, morbid ([address]  E66.01 278.01    Obesity-discussed diet, exercise management plan of hypertension-uncontrolled. Lisinopril changed to lisinopril 20 mg with hydrochlorothiazide 12.5 daily   Increase amlodipine from 5 mg to 10 mg daily  Follow-up in 6 weeks    Follow-up and Dispositions    · Return in about 6 weeks (around 8/17/2021). I asked the patient if he  had any questions and answered his  questions.   The patient stated that he understands the treatment plan and agrees with the treatment plan    This document was created with a voice activated dictation system and may contain transcription errors.

## 2021-07-06 NOTE — PROGRESS NOTES
Stephenie Pappas presents today for   Chief Complaint   Patient presents with    Hypertension     follow-up       Is someone accompanying this pt? no    Is the patient using any DME equipment during OV? no    Depression Screening:  3 most recent PHQ Screens 7/6/2021   PHQ Not Done -   Little interest or pleasure in doing things Not at all   Feeling down, depressed, irritable, or hopeless Not at all   Total Score PHQ 2 0       Learning Assessment:  Learning Assessment 9/10/2019   PRIMARY LEARNER Patient   HIGHEST LEVEL OF EDUCATION - PRIMARY LEARNER  GRADUATED HIGH SCHOOL OR GED   BARRIERS PRIMARY LEARNER NONE   CO-LEARNER CAREGIVER No   PRIMARY LANGUAGE ENGLISH   LEARNER PREFERENCE PRIMARY DEMONSTRATION   ANSWERED BY patient   RELATIONSHIP SELF       Health Maintenance reviewed and discussed and ordered per Provider. Health Maintenance Due   Topic Date Due    Pneumococcal 0-64 years (1 of 2 - PPSV23) Never done   . Coordination of Care:  1. Have you been to the ER, urgent care clinic since your last visit? Hospitalized since your last visit? no    2. Have you seen or consulted any other health care providers outside of the [address] [place] [address] since your last visit? Include any pap smears or colon screening.  no

## 2021-07-28 DIAGNOSIS — N52.9 ERECTILE DYSFUNCTION, UNSPECIFIED ERECTILE DYSFUNCTION TYPE: ICD-10-CM

## 2021-07-28 NOTE — TELEPHONE ENCOUNTER
Requested Prescriptions     Pending Prescriptions Disp Refills    sildenafil citrate (Viagra) 50 mg tablet 30 Tablet 2     Sig: One tablet once daily 1 hour (range: 30 minutes to 4 hours) before sexual activity as needed

## 2021-08-02 RX ORDER — SILDENAFIL 50 MG/1
TABLET, FILM COATED ORAL
Qty: 30 TABLET | Refills: 2 | Status: SHIPPED | OUTPATIENT
Start: 2021-08-02 | End: 2021-12-08 | Stop reason: SDUPTHER

## 2021-08-17 ENCOUNTER — OFFICE VISIT (OUTPATIENT)
Dept: FAMILY MEDICINE CLINIC | Age: 41
End: 2021-08-17
Payer: MEDICAID

## 2021-08-17 VITALS
WEIGHT: 315 LBS | HEIGHT: 72 IN | SYSTOLIC BLOOD PRESSURE: 124 MMHG | BODY MASS INDEX: 42.66 KG/M2 | OXYGEN SATURATION: 96 % | RESPIRATION RATE: 16 BRPM | TEMPERATURE: 98.1 F | HEART RATE: 79 BPM | DIASTOLIC BLOOD PRESSURE: 80 MMHG

## 2021-08-17 DIAGNOSIS — E78.2 MIXED HYPERLIPIDEMIA: ICD-10-CM

## 2021-08-17 DIAGNOSIS — Z87.39 HISTORY OF GOUT: ICD-10-CM

## 2021-08-17 DIAGNOSIS — I10 ESSENTIAL HYPERTENSION: ICD-10-CM

## 2021-08-17 PROCEDURE — 99214 OFFICE O/P EST MOD 30 MIN: CPT | Performed by: NURSE PRACTITIONER

## 2021-08-17 RX ORDER — AMLODIPINE BESYLATE 10 MG/1
10 TABLET ORAL DAILY
Qty: 90 TABLET | Refills: 0 | Status: SHIPPED | OUTPATIENT
Start: 2021-08-17 | End: 2021-09-09 | Stop reason: SDUPTHER

## 2021-08-17 RX ORDER — ALLOPURINOL 100 MG/1
300 TABLET ORAL DAILY
Qty: 270 TABLET | Refills: 0 | Status: SHIPPED | OUTPATIENT
Start: 2021-08-17 | End: 2021-09-09 | Stop reason: SDUPTHER

## 2021-08-17 RX ORDER — LISINOPRIL AND HYDROCHLOROTHIAZIDE 12.5; 2 MG/1; MG/1
1 TABLET ORAL DAILY
Qty: 90 TABLET | Refills: 0 | Status: SHIPPED | OUTPATIENT
Start: 2021-08-17 | End: 2021-09-09 | Stop reason: SDUPTHER

## 2021-08-17 RX ORDER — ATORVASTATIN CALCIUM 20 MG/1
20 TABLET, FILM COATED ORAL DAILY
Qty: 90 TABLET | Refills: 0 | Status: SHIPPED | OUTPATIENT
Start: 2021-08-17 | End: 2021-09-09 | Stop reason: SDUPTHER

## 2021-08-17 NOTE — PROGRESS NOTES
Zenaida Huertas presents today for   Chief Complaint   Patient presents with    Hypertension     follow-up       Is someone accompanying this pt? no    Is the patient using any DME equipment during OV? no    Depression Screening:  3 most recent PHQ Screens 8/17/2021   PHQ Not Done -   Little interest or pleasure in doing things Not at all   Feeling down, depressed, irritable, or hopeless Not at all   Total Score PHQ 2 0       Learning Assessment:  Learning Assessment 9/10/2019   PRIMARY LEARNER Patient   HIGHEST LEVEL OF EDUCATION - PRIMARY LEARNER  GRADUATED HIGH SCHOOL OR GED   BARRIERS PRIMARY LEARNER NONE   CO-LEARNER CAREGIVER No   PRIMARY LANGUAGE ENGLISH   LEARNER PREFERENCE PRIMARY DEMONSTRATION   ANSWERED BY patient   RELATIONSHIP SELF       Health Maintenance reviewed and discussed and ordered per Provider. There are no preventive care reminders to display for this patient. .      Coordination of Care:  1. Have you been to the ER, urgent care clinic since your last visit? Hospitalized since your last visit? no    2. Have you seen or consulted any other health care providers outside of the [address] [place] [address] since your last visit? Include any pap smears or colon screening.  no

## 2021-08-17 NOTE — PROGRESS NOTES
Carla Mckeon is a 35 y.o. male presenting today for Hypertension (follow-up)  . Chief Complaint   Patient presents with    Hypertension     follow-up       HPI:  Carla Mckeon presents to the office today for hypertension follow-up care. His was asked to follow-up today for hypertension. He was seen in the office on 7/6/2021and his BP was elevated. His BP today is 124/80. He reports his blood pressure is controlled in his home environment. He denies any chest pain, palpitation, headache or dizziness. Hyperlipidemia-patient requesting refill  for his a atorvastatin. He denies any myalgia. He is compliant with the treatment plan. ROS    ROS:  History obtained from the patient intake forms which are reviewed with the patient  · General: negative for - chills, fever, weight changes or malaise  · HEENT: no sore throat, nasal congestion, vision problems or ear problems  · Respiratory: no cough, shortness of breath, or wheezing  · Cardiovascular: no chest pain, palpitations, or dyspnea on exertion  · Gastrointestinal: no abdominal pain, N/V, change in bowel habits, or black or bloody stools  · Musculoskeletal: no back pain, joint pain, joint stiffness, muscle pain or muscle weakness  · Neurological: no numbness, tingling, headache or dizziness  · Endo:  No polyuria or polydipsia. · : no hematuria, dysuria, frequency, hesitancy, or nocturia.     · Psychological: negative for - anxiety, depression, sleep disturbances, suicidal or homicidal ideations    No Known Allergies    PHQ Screening   3 most recent PHQ Screens 8/17/2021   PHQ Not Done -   Little interest or pleasure in doing things Not at all   Feeling down, depressed, irritable, or hopeless Not at all   Total Score PHQ 2 0       History  Past Medical History:   Diagnosis Date    Elevated uric acid in blood 03/05/2015    8.6 mg/dL    Gout     Hyperlipidemia     Hypertension     Medical non-compliance     Renal insufficiency 04/06/2011 BUN/sCr: 14/1.4, GFR >60    Right knee pain 09/20/2010    Plain Film NEG    Right knee pain 04/10/2015    Plain Film NEG       Past Surgical History:   Procedure Laterality Date    HX CYST REMOVAL      HX TONSILLECTOMY         Social History     Socioeconomic History    Marital status: SINGLE     Spouse name: Not on file    Number of children: Not on file    Years of education: Not on file    Highest education level: Not on file   Occupational History    Not on file   Tobacco Use    Smoking status: Never Smoker    Smokeless tobacco: Never Used   Substance and Sexual Activity    Alcohol use: Yes     Comment: socially    Drug use: No    Sexual activity: Yes     Partners: Female     Birth control/protection: Condom   Other Topics Concern    Not on file   Social History Narrative    Not on file     Social Determinants of Health     Financial Resource Strain:     Difficulty of Paying Living Expenses:    Food Insecurity:     Worried About Running Out of Food in the Last Year:     Ran Out of Food in the Last Year:    Transportation Needs:     Lack of Transportation (Medical):  Lack of Transportation (Non-Medical):    Physical Activity:     Days of Exercise per Week:     Minutes of Exercise per Session:    Stress:     Feeling of Stress :    Social Connections:     Frequency of Communication with Friends and Family:     Frequency of Social Gatherings with Friends and Family:     Attends Cheondoism Services:     Active Member of Clubs or Organizations:     Attends Club or Organization Meetings:     Marital Status:    Intimate Partner Violence:     Fear of Current or Ex-Partner:     Emotionally Abused:     Physically Abused:     Sexually Abused:        Current Outpatient Medications   Medication Sig Dispense Refill    lisinopril-hydroCHLOROthiazide (PRINZIDE, ZESTORETIC) 20-12.5 mg per tablet Take 1 Tablet by mouth daily.  90 Tablet 0    amLODIPine (NORVASC) 10 mg tablet Take 1 Tablet by mouth daily. 90 Tablet 0    atorvastatin (LIPITOR) 20 mg tablet Take 1 Tablet by mouth daily. 90 Tablet 0    allopurinoL (ZYLOPRIM) 100 mg tablet Take 3 Tablets by mouth daily. 270 Tablet 0    sildenafil citrate (Viagra) 50 mg tablet One tablet once daily 1 hour (range: 30 minutes to 4 hours) before sexual activity as needed 30 Tablet 2    Blood Pressure Test Kit-Large kit 1 Kit by Does Not Apply route daily. 1 Kit 0    cpap machine kit by Does Not Apply route. 1 Kit 0         Vitals:    08/17/21 1038 08/17/21 1049   BP: (!) 133/91 124/80   Pulse: 79    Resp: 16    Temp: 98.1 °F (36.7 °C)    TempSrc: Oral    SpO2: 96%    Weight: 335 lb (152 kg)    Height: 6' (1.829 m)    PainSc:   0 - No pain        Physical Exam  Vitals and nursing note reviewed. Constitutional:       Appearance: He is obese. Cardiovascular:      Rate and Rhythm: Normal rate and regular rhythm. Pulses: Normal pulses. Heart sounds: Normal heart sounds. Pulmonary:      Effort: Pulmonary effort is normal.      Breath sounds: Normal breath sounds. Skin:     General: Skin is warm and dry. Neurological:      Mental Status: He is alert. No visits with results within 3 Month(s) from this visit. Latest known visit with results is:   Hospital Outpatient Visit on 03/08/2021   Component Date Value Ref Range Status    Hepatitis C virus Ab 03/08/2021 0.08  <0.80 Index Final    Hep C virus Ab Interp. 03/08/2021 Negative  NEG   Final    Hep C  virus Ab comment 03/08/2021        Final    Comment: Index <0.80. ......................... Ulisses Ahmadi Negative  Index > or = to 0.80 and <1.00. .... Ulisses Ahmadi Equivocal  Index >1.00. ......................... Ulisses Ahmadi Positive          For Equivocal or Positive results, confirmation with Hepatitis C RNA by PCR or bDNA is suggested.       WBC 03/08/2021 6.0  4.6 - 13.2 K/uL Final    RBC 03/08/2021 5.41  4.70 - 5.50 M/uL Final    HGB 03/08/2021 15.2  13.0 - 16.0 g/dL Final    HCT 03/08/2021 44.9  36.0 - 48.0 % Final  MCV 03/08/2021 83.0  74.0 - 97.0 FL Final    MCH 03/08/2021 28.1  24.0 - 34.0 PG Final    MCHC 03/08/2021 33.9  31.0 - 37.0 g/dL Final    RDW 03/08/2021 13.9  11.6 - 14.5 % Final    PLATELET 87/77/9256 353  135 - 420 K/uL Final    MPV 03/08/2021 10.7  9.2 - 11.8 FL Final    NEUTROPHILS 03/08/2021 49  40 - 73 % Final    LYMPHOCYTES 03/08/2021 35  21 - 52 % Final    MONOCYTES 03/08/2021 11* 3 - 10 % Final    EOSINOPHILS 03/08/2021 5  0 - 5 % Final    BASOPHILS 03/08/2021 0  0 - 2 % Final    ABS. NEUTROPHILS 03/08/2021 2.9  1.8 - 8.0 K/UL Final    ABS. LYMPHOCYTES 03/08/2021 2.1  0.9 - 3.6 K/UL Final    ABS. MONOCYTES 03/08/2021 0.7  0.05 - 1.2 K/UL Final    ABS. EOSINOPHILS 03/08/2021 0.3  0.0 - 0.4 K/UL Final    ABS. BASOPHILS 03/08/2021 0.0  0.0 - 0.1 K/UL Final    DF 03/08/2021 AUTOMATED    Final    LIPID PROFILE 03/08/2021        Final    Cholesterol, total 03/08/2021 155  <200 MG/DL Final    Triglyceride 03/08/2021 168* <150 MG/DL Final    Comment: The drugs N-acetylcysteine (NAC) and  Metamiszole have been found to cause falsely  low results in this chemical assay. Please  be sure to submit blood samples obtained  BEFORE administration of either of these  drugs to assure correct results.       HDL Cholesterol 03/08/2021 39* 40 - 60 MG/DL Final    LDL, calculated 03/08/2021 82.4  0 - 100 MG/DL Final    VLDL, calculated 03/08/2021 33.6  MG/DL Final    CHOL/HDL Ratio 03/08/2021 4.0  0 - 5.0   Final    Sodium 03/08/2021 141  136 - 145 mmol/L Final    Potassium 03/08/2021 4.2  3.5 - 5.5 mmol/L Final    Chloride 03/08/2021 104  100 - 111 mmol/L Final    CO2 03/08/2021 31  21 - 32 mmol/L Final    Anion gap 03/08/2021 6  3.0 - 18 mmol/L Final    Glucose 03/08/2021 75  74 - 99 mg/dL Final    BUN 03/08/2021 11  7.0 - 18 MG/DL Final    Creatinine 03/08/2021 1.00  0.6 - 1.3 MG/DL Final    BUN/Creatinine ratio 03/08/2021 11* 12 - 20   Final    GFR est AA 03/08/2021 >60  >60 ml/min/1.73m2 Final    GFR est non-AA 03/08/2021 >60  >60 ml/min/1.73m2 Final    Comment: (NOTE)  Estimated GFR is calculated using the Modification of Diet in Renal   Disease (MDRD) Study equation, reported for both  Americans   (GFRAA) and non- Americans (GFRNA), and normalized to 1.73m2   body surface area. The physician must decide which value applies to   the patient. The MDRD study equation should only be used in   individuals age 25 or older. It has not been validated for the   following: pregnant women, patients with serious comorbid conditions,   or on certain medications, or persons with extremes of body size,   muscle mass, or nutritional status.  Calcium 03/08/2021 9.2  8.5 - 10.1 MG/DL Final    Bilirubin, total 03/08/2021 0.5  0.2 - 1.0 MG/DL Final    ALT (SGPT) 03/08/2021 53  16 - 61 U/L Final    AST (SGOT) 03/08/2021 37  10 - 38 U/L Final    Alk. phosphatase 03/08/2021 71  45 - 117 U/L Final    Protein, total 03/08/2021 8.1  6.4 - 8.2 g/dL Final    Albumin 03/08/2021 4.0  3.4 - 5.0 g/dL Final    Globulin 03/08/2021 4.1* 2.0 - 4.0 g/dL Final    A-G Ratio 03/08/2021 1.0  0.8 - 1.7   Final       No results found for any visits on 08/17/21. Patient Care Team:  Patient Care Team:  Lambert Cullen NP as PCP - General (Nurse Practitioner)  Lambert Cullen NP as PCP - [facility] Provider      Assessment / Plan:      ICD-10-CM ICD-9-CM    1. Essential hypertension  I10 401.9 lisinopril-hydroCHLOROthiazide (PRINZIDE, ZESTORETIC) 20-12.5 mg per tablet      amLODIPine (NORVASC) 10 mg tablet   2. Mixed hyperlipidemia  E78.2 272.2 atorvastatin (LIPITOR) 20 mg tablet   3. History of gout  Z87.39 V12.29 allopurinoL (ZYLOPRIM) 100 mg tablet   Obesity-discussed diet, exercise management plan of hypertension-uncontrolled. Medication refills given  Continue current treatment plan.   Denies any side effects or adverse reactions to the medications. Hypertension-controlled. Hyperlipidemia-previous lipid panel was March, 2021. Continue atorvastatin  Follow-up and Dispositions    · Return in about 3 months (around 11/17/2021). I asked the patient if he  had any questions and answered his  questions. The patient stated that he understands the treatment plan and agrees with the treatment plan    This document was created with a voice activated dictation system and may contain transcription errors.

## 2021-09-09 DIAGNOSIS — E78.2 MIXED HYPERLIPIDEMIA: ICD-10-CM

## 2021-09-09 DIAGNOSIS — Z87.39 HISTORY OF GOUT: ICD-10-CM

## 2021-09-09 DIAGNOSIS — I10 ESSENTIAL HYPERTENSION: ICD-10-CM

## 2021-09-09 NOTE — TELEPHONE ENCOUNTER
Requested Prescriptions     Pending Prescriptions Disp Refills    lisinopril-hydroCHLOROthiazide (PRINZIDE, ZESTORETIC) 20-12.5 mg per tablet 90 Tablet 0     Sig: Take 1 Tablet by mouth daily.  atorvastatin (LIPITOR) 20 mg tablet 90 Tablet 0     Sig: Take 1 Tablet by mouth daily.  amLODIPine (NORVASC) 10 mg tablet 90 Tablet 0     Sig: Take 1 Tablet by mouth daily.  allopurinoL (ZYLOPRIM) 100 mg tablet 270 Tablet 0     Sig: Take 3 Tablets by mouth daily.

## 2021-09-10 RX ORDER — AMLODIPINE BESYLATE 10 MG/1
10 TABLET ORAL DAILY
Qty: 90 TABLET | Refills: 0 | Status: SHIPPED | OUTPATIENT
Start: 2021-09-10 | End: 2021-12-08 | Stop reason: SDUPTHER

## 2021-09-10 RX ORDER — ALLOPURINOL 100 MG/1
300 TABLET ORAL DAILY
Qty: 270 TABLET | Refills: 0 | Status: SHIPPED | OUTPATIENT
Start: 2021-09-10 | End: 2021-12-08 | Stop reason: SDUPTHER

## 2021-09-10 RX ORDER — LISINOPRIL AND HYDROCHLOROTHIAZIDE 12.5; 2 MG/1; MG/1
1 TABLET ORAL DAILY
Qty: 90 TABLET | Refills: 0 | Status: SHIPPED | OUTPATIENT
Start: 2021-09-10 | End: 2022-03-23 | Stop reason: SDUPTHER

## 2021-09-10 RX ORDER — ATORVASTATIN CALCIUM 20 MG/1
20 TABLET, FILM COATED ORAL DAILY
Qty: 90 TABLET | Refills: 0 | Status: SHIPPED | OUTPATIENT
Start: 2021-09-10 | End: 2021-12-08 | Stop reason: SDUPTHER

## 2021-11-17 ENCOUNTER — OFFICE VISIT (OUTPATIENT)
Dept: FAMILY MEDICINE CLINIC | Age: 41
End: 2021-11-17
Payer: MEDICAID

## 2021-11-17 VITALS
WEIGHT: 315 LBS | RESPIRATION RATE: 16 BRPM | DIASTOLIC BLOOD PRESSURE: 100 MMHG | TEMPERATURE: 96 F | HEART RATE: 91 BPM | OXYGEN SATURATION: 95 % | SYSTOLIC BLOOD PRESSURE: 140 MMHG | HEIGHT: 72 IN | BODY MASS INDEX: 42.66 KG/M2

## 2021-11-17 DIAGNOSIS — I10 HYPERTENSION, UNSPECIFIED TYPE: Primary | ICD-10-CM

## 2021-11-17 DIAGNOSIS — E78.2 MIXED HYPERLIPIDEMIA: ICD-10-CM

## 2021-11-17 DIAGNOSIS — E66.01 OBESITY, MORBID (HCC): ICD-10-CM

## 2021-11-17 DIAGNOSIS — G47.30 SLEEP APNEA IN ADULT: ICD-10-CM

## 2021-11-17 PROCEDURE — 99214 OFFICE O/P EST MOD 30 MIN: CPT | Performed by: NURSE PRACTITIONER

## 2021-11-17 NOTE — PROGRESS NOTES
Jessica Mayorga presents today for   Chief Complaint   Patient presents with    Hypertension     3 month follow-up    Weight Gain       Is someone accompanying this pt? no    Is the patient using any DME equipment during OV? no    Depression Screening:  3 most recent PHQ Screens 11/17/2021   PHQ Not Done -   Little interest or pleasure in doing things Not at all   Feeling down, depressed, irritable, or hopeless Not at all   Total Score PHQ 2 0       Learning Assessment:  Learning Assessment 9/10/2019   PRIMARY LEARNER Patient   HIGHEST LEVEL OF EDUCATION - PRIMARY LEARNER  GRADUATED HIGH SCHOOL OR GED   BARRIERS PRIMARY LEARNER NONE   CO-LEARNER CAREGIVER No   PRIMARY LANGUAGE ENGLISH   LEARNER PREFERENCE PRIMARY DEMONSTRATION   ANSWERED BY patient   RELATIONSHIP SELF       Health Maintenance reviewed and discussed and ordered per Provider. There are no preventive care reminders to display for this patient. .      Coordination of Care:  1. Have you been to the ER, urgent care clinic since your last visit? Hospitalized since your last visit? no    2. Have you seen or consulted any other health care providers outside of the [address] [place] [address] since your last visit? Include any pap smears or colon screening.  no

## 2021-11-17 NOTE — PROGRESS NOTES
Benny Franklin is a 35 y.o. male presenting today for Hypertension (3 month follow-up) and Weight Gain  . Chief Complaint   Patient presents with    Hypertension     3 month follow-up    Weight Gain       Hypertension     :  Benny Franklin presents to the office today for hypertension follow-up care. His was asked to follow-up today for hypertension his BP is elevated today. He reports his blood pressure is controlled in his home environment. He denies any chest pain, palpitation, headache or dizziness. Hyperlipidemia-patient requesting refill  for his a atorvastatin. He denies any myalgia. He is compliant with the treatment plan. Obesity-15 pound weight gain since last visit. Discussed diet and exercise with the patient. He admits that he drinks quite a bit of alcohol daily. HTN- BP mildly elevated today. Negative for CP or palpitation  ROS    ROS:  History obtained from the patient intake forms which are reviewed with the patient  · General:15 lb weight gain  · HEENT: no sore throat, nasal congestion, vision problems or ear problems  · Respiratory: no cough, shortness of breath, or wheezing  · Cardiovascular: no chest pain, palpitations, or dyspnea on exertion  · Gastrointestinal: no abdominal pain, N/V, change in bowel habits, or black or bloody stools  · Musculoskeletal: no back pain, joint pain, joint stiffness, muscle pain or muscle weakness  · Neurological: no numbness, tingling, headache or dizziness  · Endo:  No polyuria or polydipsia. · : no hematuria, dysuria, frequency, hesitancy, or nocturia.     · Psychological: negative for - anxiety, depression, sleep disturbances, suicidal or homicidal ideations    No Known Allergies    PHQ Screening   3 most recent PHQ Screens 11/17/2021   PHQ Not Done -   Little interest or pleasure in doing things Not at all   Feeling down, depressed, irritable, or hopeless Not at all   Total Score PHQ 2 0       History  Past Medical History: Diagnosis Date    Elevated uric acid in blood 03/05/2015    8.6 mg/dL    Gout     Hyperlipidemia     Hypertension     Medical non-compliance     Renal insufficiency 04/06/2011    BUN/sCr: 14/1.4, GFR >60    Right knee pain 09/20/2010    Plain Film NEG    Right knee pain 04/10/2015    Plain Film NEG       Past Surgical History:   Procedure Laterality Date    HX CYST REMOVAL      HX TONSILLECTOMY         Social History     Socioeconomic History    Marital status: SINGLE     Spouse name: Not on file    Number of children: Not on file    Years of education: Not on file    Highest education level: Not on file   Occupational History    Not on file   Tobacco Use    Smoking status: Never Smoker    Smokeless tobacco: Never Used   Substance and Sexual Activity    Alcohol use: Yes     Comment: socially    Drug use: No    Sexual activity: Yes     Partners: Female     Birth control/protection: Condom   Other Topics Concern    Not on file   Social History Narrative    Not on file     Social Determinants of Health     Financial Resource Strain:     Difficulty of Paying Living Expenses: Not on file   Food Insecurity:     Worried About Running Out of Food in the Last Year: Not on file    Jus of Food in the Last Year: Not on file   Transportation Needs:     Lack of Transportation (Medical): Not on file    Lack of Transportation (Non-Medical):  Not on file   Physical Activity:     Days of Exercise per Week: Not on file    Minutes of Exercise per Session: Not on file   Stress:     Feeling of Stress : Not on file   Social Connections:     Frequency of Communication with Friends and Family: Not on file    Frequency of Social Gatherings with Friends and Family: Not on file    Attends Druze Services: Not on file    Active Member of Clubs or Organizations: Not on file    Attends Club or Organization Meetings: Not on file    Marital Status: Not on file   Intimate Partner Violence:     Fear of Current or Ex-Partner: Not on file    Emotionally Abused: Not on file    Physically Abused: Not on file    Sexually Abused: Not on file   Housing Stability:     Unable to Pay for Housing in the Last Year: Not on file    Number of Places Lived in the Last Year: Not on file    Unstable Housing in the Last Year: Not on file       Current Outpatient Medications   Medication Sig Dispense Refill    lisinopril-hydroCHLOROthiazide (PRINZIDE, ZESTORETIC) 20-12.5 mg per tablet Take 1 Tablet by mouth daily. 90 Tablet 0    atorvastatin (LIPITOR) 20 mg tablet Take 1 Tablet by mouth daily. 90 Tablet 0    amLODIPine (NORVASC) 10 mg tablet Take 1 Tablet by mouth daily. 90 Tablet 0    allopurinoL (ZYLOPRIM) 100 mg tablet Take 3 Tablets by mouth daily. 270 Tablet 0    sildenafil citrate (Viagra) 50 mg tablet One tablet once daily 1 hour (range: 30 minutes to 4 hours) before sexual activity as needed 30 Tablet 2    Blood Pressure Test Kit-Large kit 1 Kit by Does Not Apply route daily. 1 Kit 0    cpap machine kit by Does Not Apply route. 1 Kit 0         Vitals:    11/17/21 0733   BP: (!) 142/89   Pulse: 91   Resp: 16   Temp: (!) 96 °F (35.6 °C)   TempSrc: Oral   SpO2: 95%   Weight: 349 lb (158.3 kg)   Height: 6' (1.829 m)   PainSc:   0 - No pain       Physical Exam  Vitals and nursing note reviewed. Constitutional:       Appearance: He is obese. Cardiovascular:      Rate and Rhythm: Normal rate and regular rhythm. Pulses: Normal pulses. Heart sounds: Normal heart sounds. Pulmonary:      Effort: Pulmonary effort is normal.      Breath sounds: Normal breath sounds. Skin:     General: Skin is warm and dry. Neurological:      Mental Status: He is alert. No visits with results within 3 Month(s) from this visit.    Latest known visit with results is:   Hospital Outpatient Visit on 03/08/2021   Component Date Value Ref Range Status    Hepatitis C virus Ab 03/08/2021 0.08  <0.80 Index Final    Hep C virus Ab Interp. 03/08/2021 Negative  NEG   Final    Hep C  virus Ab comment 03/08/2021        Final    Comment: Index <0.80. ......................... Lorrie Dye Negative  Index > or = to 0.80 and <1.00. .... Lorrie Dye Lorrie Dye Equivocal  Index >1.00. ......................... Lorrie Dye Positive          For Equivocal or Positive results, confirmation with Hepatitis C RNA by PCR or bDNA is suggested.  WBC 03/08/2021 6.0  4.6 - 13.2 K/uL Final    RBC 03/08/2021 5.41  4.70 - 5.50 M/uL Final    HGB 03/08/2021 15.2  13.0 - 16.0 g/dL Final    HCT 03/08/2021 44.9  36.0 - 48.0 % Final    MCV 03/08/2021 83.0  74.0 - 97.0 FL Final    MCH 03/08/2021 28.1  24.0 - 34.0 PG Final    MCHC 03/08/2021 33.9  31.0 - 37.0 g/dL Final    RDW 03/08/2021 13.9  11.6 - 14.5 % Final    PLATELET 46/75/7114 105  135 - 420 K/uL Final    MPV 03/08/2021 10.7  9.2 - 11.8 FL Final    NEUTROPHILS 03/08/2021 49  40 - 73 % Final    LYMPHOCYTES 03/08/2021 35  21 - 52 % Final    MONOCYTES 03/08/2021 11* 3 - 10 % Final    EOSINOPHILS 03/08/2021 5  0 - 5 % Final    BASOPHILS 03/08/2021 0  0 - 2 % Final    ABS. NEUTROPHILS 03/08/2021 2.9  1.8 - 8.0 K/UL Final    ABS. LYMPHOCYTES 03/08/2021 2.1  0.9 - 3.6 K/UL Final    ABS. MONOCYTES 03/08/2021 0.7  0.05 - 1.2 K/UL Final    ABS. EOSINOPHILS 03/08/2021 0.3  0.0 - 0.4 K/UL Final    ABS. BASOPHILS 03/08/2021 0.0  0.0 - 0.1 K/UL Final    DF 03/08/2021 AUTOMATED    Final    LIPID PROFILE 03/08/2021        Final    Cholesterol, total 03/08/2021 155  <200 MG/DL Final    Triglyceride 03/08/2021 168* <150 MG/DL Final    Comment: The drugs N-acetylcysteine (NAC) and  Metamiszole have been found to cause falsely  low results in this chemical assay. Please  be sure to submit blood samples obtained  BEFORE administration of either of these  drugs to assure correct results.       HDL Cholesterol 03/08/2021 39* 40 - 60 MG/DL Final    LDL, calculated 03/08/2021 82.4  0 - 100 MG/DL Final    VLDL, calculated 03/08/2021 33.6  MG/DL Final    CHOL/HDL Ratio 03/08/2021 4.0  0 - 5.0   Final    Sodium 03/08/2021 141  136 - 145 mmol/L Final    Potassium 03/08/2021 4.2  3.5 - 5.5 mmol/L Final    Chloride 03/08/2021 104  100 - 111 mmol/L Final    CO2 03/08/2021 31  21 - 32 mmol/L Final    Anion gap 03/08/2021 6  3.0 - 18 mmol/L Final    Glucose 03/08/2021 75  74 - 99 mg/dL Final    BUN 03/08/2021 11  7.0 - 18 MG/DL Final    Creatinine 03/08/2021 1.00  0.6 - 1.3 MG/DL Final    BUN/Creatinine ratio 03/08/2021 11* 12 - 20   Final    GFR est AA 03/08/2021 >60  >60 ml/min/1.73m2 Final    GFR est non-AA 03/08/2021 >60  >60 ml/min/1.73m2 Final    Comment: (NOTE)  Estimated GFR is calculated using the Modification of Diet in Renal   Disease (MDRD) Study equation, reported for both  Americans   (GFRAA) and non- Americans (GFRNA), and normalized to 1.73m2   body surface area. The physician must decide which value applies to   the patient. The MDRD study equation should only be used in   individuals age 25 or older. It has not been validated for the   following: pregnant women, patients with serious comorbid conditions,   or on certain medications, or persons with extremes of body size,   muscle mass, or nutritional status.  Calcium 03/08/2021 9.2  8.5 - 10.1 MG/DL Final    Bilirubin, total 03/08/2021 0.5  0.2 - 1.0 MG/DL Final    ALT (SGPT) 03/08/2021 53  16 - 61 U/L Final    AST (SGOT) 03/08/2021 37  10 - 38 U/L Final    Alk. phosphatase 03/08/2021 71  45 - 117 U/L Final    Protein, total 03/08/2021 8.1  6.4 - 8.2 g/dL Final    Albumin 03/08/2021 4.0  3.4 - 5.0 g/dL Final    Globulin 03/08/2021 4.1* 2.0 - 4.0 g/dL Final    A-G Ratio 03/08/2021 1.0  0.8 - 1.7   Final       No results found for any visits on 11/17/21.     Patient Care Team:  Patient Care Team:  Bibi Crandall NP as PCP - General (Nurse Practitioner)  Bibi Crandall NP as PCP - [facility] Provider      Assessment / Plan:      ICD-10-CM ICD-9-CM    1. Hypertension, unspecified type  I10 401.9 LIPID PANEL      METABOLIC PANEL, COMPREHENSIVE      CBC WITH AUTOMATED DIFF   2. Mixed hyperlipidemia  E78.2 272.2 LIPID PANEL   3. Sleep apnea in adult  G47.30 327.23    4. Obesity, morbid ([facility] [address]  E66.01 278.01    Obesity-discussed diet, exercise management plan of hypertension-uncontrolled. Continue current treatment plan. Denies any side effects or adverse reactions to the medications. Hypertension-elevated today. BP medication taken at 7 a.m  Hyperlipidemia-previous lipid panel was March, 2021. Continue atorvastatin  Fasting labs ordered      I asked the patient if he  had any questions and answered his  questions. The patient stated that he understands the treatment plan and agrees with the treatment plan    This document was created with a voice activated dictation system and may contain transcription errors.

## 2021-12-08 DIAGNOSIS — Z87.39 HISTORY OF GOUT: ICD-10-CM

## 2021-12-08 DIAGNOSIS — E78.2 MIXED HYPERLIPIDEMIA: ICD-10-CM

## 2021-12-08 DIAGNOSIS — G47.30 SLEEP APNEA, UNSPECIFIED TYPE: ICD-10-CM

## 2021-12-08 DIAGNOSIS — I10 ESSENTIAL HYPERTENSION: ICD-10-CM

## 2021-12-08 DIAGNOSIS — N52.9 ERECTILE DYSFUNCTION, UNSPECIFIED ERECTILE DYSFUNCTION TYPE: ICD-10-CM

## 2021-12-08 NOTE — TELEPHONE ENCOUNTER
Requested Prescriptions     Pending Prescriptions Disp Refills    sildenafil citrate (Viagra) 50 mg tablet 30 Tablet 2     Sig: One tablet once daily 1 hour (range: 30 minutes to 4 hours) before sexual activity as needed    cpap machine kit 1 Kit 0     Sig: by Does Not Apply route.  atorvastatin (LIPITOR) 20 mg tablet 90 Tablet 0     Sig: Take 1 Tablet by mouth daily.  amLODIPine (NORVASC) 10 mg tablet 90 Tablet 0     Sig: Take 1 Tablet by mouth daily.  allopurinoL (ZYLOPRIM) 100 mg tablet 270 Tablet 0     Sig: Take 3 Tablets by mouth daily.

## 2021-12-10 RX ORDER — ALLOPURINOL 100 MG/1
300 TABLET ORAL DAILY
Qty: 270 TABLET | Refills: 0 | Status: SHIPPED | OUTPATIENT
Start: 2021-12-10 | End: 2022-03-23 | Stop reason: SDUPTHER

## 2021-12-10 RX ORDER — AMLODIPINE BESYLATE 10 MG/1
10 TABLET ORAL DAILY
Qty: 90 TABLET | Refills: 0 | Status: SHIPPED | OUTPATIENT
Start: 2021-12-10 | End: 2022-03-23 | Stop reason: SDUPTHER

## 2021-12-10 RX ORDER — ATORVASTATIN CALCIUM 20 MG/1
20 TABLET, FILM COATED ORAL DAILY
Qty: 90 TABLET | Refills: 0 | Status: SHIPPED | OUTPATIENT
Start: 2021-12-10 | End: 2022-03-23 | Stop reason: SDUPTHER

## 2021-12-10 RX ORDER — SILDENAFIL 50 MG/1
TABLET, FILM COATED ORAL
Qty: 30 TABLET | Refills: 2 | Status: SHIPPED | OUTPATIENT
Start: 2021-12-10 | End: 2022-05-18 | Stop reason: SDUPTHER

## 2021-12-25 ENCOUNTER — HOSPITAL ENCOUNTER (EMERGENCY)
Age: 41
Discharge: HOME | End: 2021-12-25
Attending: STUDENT IN AN ORGANIZED HEALTH CARE EDUCATION/TRAINING PROGRAM
Payer: COMMERCIAL

## 2021-12-25 ENCOUNTER — APPOINTMENT (OUTPATIENT)
Dept: GENERAL RADIOLOGY | Age: 41
End: 2021-12-25
Attending: STUDENT IN AN ORGANIZED HEALTH CARE EDUCATION/TRAINING PROGRAM
Payer: COMMERCIAL

## 2021-12-25 VITALS
DIASTOLIC BLOOD PRESSURE: 87 MMHG | OXYGEN SATURATION: 99 % | WEIGHT: 315 LBS | SYSTOLIC BLOOD PRESSURE: 157 MMHG | BODY MASS INDEX: 42.66 KG/M2 | RESPIRATION RATE: 22 BRPM | HEIGHT: 72 IN | TEMPERATURE: 99.1 F | HEART RATE: 87 BPM

## 2021-12-25 DIAGNOSIS — S40.011A CONTUSION OF RIGHT SHOULDER, INITIAL ENCOUNTER: Primary | ICD-10-CM

## 2021-12-25 PROCEDURE — 74011250637 HC RX REV CODE- 250/637: Performed by: STUDENT IN AN ORGANIZED HEALTH CARE EDUCATION/TRAINING PROGRAM

## 2021-12-25 PROCEDURE — 73030 X-RAY EXAM OF SHOULDER: CPT

## 2021-12-25 PROCEDURE — 99283 EMERGENCY DEPT VISIT LOW MDM: CPT

## 2021-12-25 RX ORDER — IBUPROFEN 400 MG/1
800 TABLET ORAL
Status: COMPLETED | OUTPATIENT
Start: 2021-12-25 | End: 2021-12-25

## 2021-12-25 RX ADMIN — IBUPROFEN 800 MG: 400 TABLET, FILM COATED ORAL at 07:55

## 2021-12-25 NOTE — ED PROVIDER NOTES
EMERGENCY DEPARTMENT HISTORY AND PHYSICAL EXAM    I have evaluated the patient at 9:16 AM      Date: 12/25/2021  Patient Name: Keith Zamora    History of Presenting Illness     Chief Complaint   Patient presents with    Shoulder Injury         History Provided By: Patient  Location/Duration/Severity/Modifying factors   25-year-old male with history of hypertension and hyperlipidemia presenting to the emergency department for evaluation of right shoulder pain. Patient missed a step while going down stairs 2 days ago and stumbled forward striking his right shoulder against the wall. He did not fall or hit his head or lose consciousness. Since then has been experiencing persistent right shoulder pain and limited range of motion secondary to pain prompting his emergency department visit today. He is neurovascularly intact. No other complaints or injuries. PCP: Genoveva Rodrigez NP    Current Outpatient Medications   Medication Sig Dispense Refill    sildenafil citrate (Viagra) 50 mg tablet One tablet once daily 1 hour (range: 30 minutes to 4 hours) before sexual activity as needed 30 Tablet 2    cpap machine kit by Does Not Apply route. 1 Kit 0    atorvastatin (LIPITOR) 20 mg tablet Take 1 Tablet by mouth daily. 90 Tablet 0    amLODIPine (NORVASC) 10 mg tablet Take 1 Tablet by mouth daily. 90 Tablet 0    allopurinoL (ZYLOPRIM) 100 mg tablet Take 3 Tablets by mouth daily. 270 Tablet 0    lisinopril-hydroCHLOROthiazide (PRINZIDE, ZESTORETIC) 20-12.5 mg per tablet Take 1 Tablet by mouth daily.  90 Tablet 0       Past History     Past Medical History:  Past Medical History:   Diagnosis Date    Elevated uric acid in blood 03/05/2015    8.6 mg/dL    Gout     Hyperlipidemia     Hypertension     Medical non-compliance     Morbid obesity ([facility] [address]     Renal insufficiency 04/06/2011    BUN/sCr: 14/1.4, GFR >60    Right knee pain 09/20/2010    Plain Film NEG    Right knee pain 04/10/2015 Plain Film NEG       Past Surgical History:  Past Surgical History:   Procedure Laterality Date    HX CYST REMOVAL      HX TONSILLECTOMY         Family History:  Family History   Problem Relation Age of Onset    Hypertension Mother    Zannie Cowden Gout Mother     Hypertension Father        Social History:  Social History     Tobacco Use    Smoking status: Never Smoker    Smokeless tobacco: Never Used   Substance Use Topics    Alcohol use: Yes     Comment: socially    Drug use: No       Allergies:  No Known Allergies      Review of Systems       Review of Systems   Constitutional: Negative for activity change, chills, diaphoresis, fatigue and fever. Respiratory: Negative for cough, chest tightness, shortness of breath, wheezing and stridor. Cardiovascular: Negative for chest pain and palpitations. Gastrointestinal: Negative for abdominal distention, abdominal pain, constipation, diarrhea, nausea and vomiting. Musculoskeletal: Positive for arthralgias. Negative for back pain, joint swelling and myalgias. Right shoulder pain   Skin: Negative for rash and wound. Neurological: Negative for dizziness, tremors, seizures, syncope, weakness, light-headedness, numbness and headaches. Psychiatric/Behavioral: Negative for agitation. The patient is not nervous/anxious. Physical Exam     Visit Vitals  BP (!) 185/108 (BP 1 Location: Left upper arm, BP Patient Position: At rest) Comment: did not take medication yet   Pulse 86   Temp 99.1 °F (37.3 °C)   Resp 22   Ht 6' (1.829 m)   Wt 153.3 kg (338 lb)   SpO2 99%   BMI 45.84 kg/m²         Physical Exam  Constitutional:       General: He is not in acute distress. Appearance: He is not toxic-appearing. HENT:      Head: Normocephalic and atraumatic. Mouth/Throat:      Mouth: Mucous membranes are moist.   Eyes:      Extraocular Movements: Extraocular movements intact. Pupils: Pupils are equal, round, and reactive to light.    Cardiovascular: Rate and Rhythm: Normal rate and regular rhythm. Heart sounds: Normal heart sounds. No murmur heard. No friction rub. No gallop. Pulmonary:      Effort: Pulmonary effort is normal.      Breath sounds: Normal breath sounds. Abdominal:      General: There is no distension. Palpations: Abdomen is soft. There is no mass. Tenderness: There is no abdominal tenderness. There is no guarding. Hernia: No hernia is present. Musculoskeletal:         General: Tenderness present. No swelling or deformity. Cervical back: Normal range of motion and neck supple. Comments: Minor tenderness to palpation over the anterolateral right shoulder. Limited range of motion secondary to to pain specifically with abduction   Skin:     General: Skin is warm and dry. Findings: No rash. Neurological:      General: No focal deficit present. Mental Status: He is alert and oriented to person, place, and time. Sensory: No sensory deficit. Motor: No weakness. Psychiatric:         Mood and Affect: Mood normal.           Diagnostic Study Results     Labs -  No results found for this or any previous visit (from the past 12 hour(s)). Radiologic Studies -   XR SHOULDER RT AP/LAT MIN 2 V   Final Result      No evidence of acute fracture or dislocation. Medical Decision Making   I am the first provider for this patient. I reviewed the vital signs, available nursing notes, past medical history, past surgical history, family history and social history. Vital Signs-Reviewed the patient's vital signs. Records Reviewed: Nursing Notes (Time of Review: 9:16 AM)    ED Course: Progress Notes, Reevaluation, and Consults:         Provider Notes (Medical Decision Making):   MDM  Number of Diagnoses or Management Options  Contusion of right shoulder, initial encounter  Diagnosis management comments: Patient is neurovascularly intact distally. No deformity appreciated. X-ray shows no evidence of osseous injury. Patient likely has shoulder contusion possible bursitis. He has been reassured and instructed conservative measures for care at home. ED return precautions discussed. Instructed on follow-up with primary care doctor. Patient verbalized understanding agreement plan. Diagnosis     Clinical Impression:   1. Contusion of right shoulder, initial encounter        Disposition: home    Follow-up Information     Follow up With Specialties Details Why [facility]    [facility] EMERGENCY DEPT Emergency Medicine  As needed, If symptoms worsen [address] [facility] [id number] [phone]    Clark Ramirez NP Nurse Practitioner Call   [address] [place] [address] [phone]             Patient's Medications   Start Taking    No medications on file   Continue Taking    ALLOPURINOL (ZYLOPRIM) 100 MG TABLET    Take 3 Tablets by mouth daily. AMLODIPINE (NORVASC) 10 MG TABLET    Take 1 Tablet by mouth daily. ATORVASTATIN (LIPITOR) 20 MG TABLET    Take 1 Tablet by mouth daily. CPAP MACHINE KIT    by Does Not Apply route. LISINOPRIL-HYDROCHLOROTHIAZIDE (PRINZIDE, ZESTORETIC) 20-12.5 MG PER TABLET    Take 1 Tablet by mouth daily. SILDENAFIL CITRATE (VIAGRA) 50 MG TABLET    One tablet once daily 1 hour (range: 30 minutes to 4 hours) before sexual activity as needed   These Medications have changed    No medications on file   Stop Taking    BLOOD PRESSURE TEST KIT-LARGE KIT    1 Kit by Does Not Apply route daily. Disclaimer: Sections of this note are dictated using utilizing voice recognition software. Minor typographical errors may be present. If questions arise, please do not hesitate to contact me or call our department.

## 2021-12-30 NOTE — DISCHARGE INSTRUCTIONS
Gout: Care Instructions  Your Care Instructions  Gout is a form of arthritis caused by a buildup of uric acid crystals in a joint. It causes sudden attacks of pain, swelling, redness, and stiffness, usually in one joint, especially the big toe. Gout usually comes on without a cause. But it can be brought on by drinking alcohol (especially beer) or eating seafood and red meat. Taking certain medicines, such as diuretics or aspirin, also can bring on an attack of gout. Taking your medicines as prescribed and following up with your doctor regularly can help you avoid gout attacks in the future. Follow-up care is a key part of your treatment and safety. Be sure to make and go to all appointments, and call your doctor if you are having problems. Its also a good idea to know your test results and keep a list of the medicines you take. How can you care for yourself at home? · If the joint is swollen, put ice or a cold pack on the area for 10 to 20 minutes at a time. Put a thin cloth between the ice and your skin. · Prop up the sore limb on a pillow when you ice it or anytime you sit or lie down during the next 3 days. Try to keep it above the level of your heart. This will help reduce swelling. · Rest sore joints. Avoid activities that put weight or strain on the joints for a few days. Take short rest breaks from your regular activities during the day. · Take your medicines exactly as prescribed. Call your doctor if you think you are having a problem with your medicine. · Take pain medicines exactly as directed. ¨ If the doctor gave you a prescription medicine for pain, take it as prescribed. ¨ If you are not taking a prescription pain medicine, ask your doctor if you can take an over-the-counter medicine. · Eat less seafood and red meat. · Check with your doctor before drinking alcohol. · Losing weight, if you are overweight, may help reduce attacks of gout. But do not go on a [employer]. \" Losing a This note was copied from a baby's chart  Mom called to help  To Feed the baby  Worked on positioning infant up at chest level and starting to feed infant with nose arriving at the nipple  Then, using areolar compression to achieve a deep latch that is comfortable and exchanges optimum amounts of milk  Mom chose left breast using football hold  Hand expressed drops and baby took a few small latches with piston sucks till popped off with relaxed tone  Dad remains supportive at bedside  Encouraged parents to call for assistance, questions, and concerns about breastfeeding  Extension provided  lot of weight in a short amount of time can cause a gout attack. When should you call for help? Call your doctor now or seek immediate medical care if:  · You have a fever. · The joint is so painful you cannot use it. · You have sudden, unexplained swelling, redness, warmth, or severe pain in one or more joints. Watch closely for changes in your health, and be sure to contact your doctor if:  · You have joint pain. · Your symptoms get worse or are not improving after 2 or 3 days. Where can you learn more? Go to http://elli-vani.info/. Enter Z827 in the search box to learn more about \"Gout: Care Instructions. \"  Current as of: October 31, 2016  Content Version: 11.3  © 4362-9198 [employer]. Care instructions adapted under license by [employer]? (which disclaims liability or warranty for this information). If you have questions about a medical condition or this instruction, always ask your healthcare professional. [place] [address] any warranty or liability for your use of this information.

## 2022-03-19 PROBLEM — E66.01 OBESITY, MORBID (HCC): Status: ACTIVE | Noted: 2019-04-09

## 2022-03-20 PROBLEM — G47.30 SLEEP APNEA IN ADULT: Status: ACTIVE | Noted: 2019-08-09

## 2022-03-21 NOTE — DISCHARGE INSTRUCTIONS
Patient Education        Learning About Coronavirus [phone])  Coronavirus [phone]): Overview  What is coronavirus ([id number]-48)? The coronavirus disease (COVID-19) is caused by a virus. It is an illness that was first found in [place], [place], in December 2019. It has since spread worldwide. The virus can cause fever, cough, and trouble breathing. In severe cases, it can cause pneumonia and make it hard to breathe without help. It can cause death. Coronaviruses are a large group of viruses. They cause the common cold. They also cause more serious illnesses like Middle East respiratory syndrome (MERS) and severe acute respiratory syndrome (SARS). COVID-19 is caused by a novel coronavirus. That means it's a new type that has not been seen in people before. This virus spreads person-to-person through droplets from coughing and sneezing. It can also spread when you are close to someone who is infected. And it can spread when you touch something that has the virus on it, such as a doorknob or a tabletop. What can you do to protect yourself from coronavirus (COVID-19)? The best way to protect yourself from getting sick is to:  · Avoid areas where there is an outbreak. · Avoid contact with people who may be infected. · Wash your hands often with soap or alcohol-based hand sanitizers. · Avoid crowds and try to stay at least 6 feet away from other people. · Wash your hands often, especially after you cough or sneeze. Use soap and water, and scrub for at least 20 seconds. If soap and water aren't available, use an alcohol-based hand . · Avoid touching your mouth, nose, and eyes. What can you do to avoid spreading the virus to others? To help avoid spreading the virus to others:  · Cover your mouth with a tissue when you cough or sneeze. Then throw the tissue in the trash. · Use a disinfectant to clean things that you touch often. · Wear a cloth face cover if you have to go to public areas.   · Stay home if you are sick or have been exposed to the virus. Don't go to school, work, or public areas. And don't use public transportation, ride-shares, or taxis unless you have no choice. · If you are sick:  ? Leave your home only if you need to get medical care. But call the doctor's office first so they know you're coming. And wear a face cover. ? Wear the face cover whenever you're around other people. It can help stop the spread of the virus when you cough or sneeze. ? Clean and disinfect your home every day. Use household  and disinfectant wipes or sprays. Take special care to clean things that you grab with your hands. These include doorknobs, remote controls, phones, and handles on your refrigerator and microwave. And don't forget countertops, tabletops, bathrooms, and computer keyboards. When to call for help  [id number] anytime you think you may need emergency care. For example, call if:  · You have severe trouble breathing. (You can't talk at all.)  · You have constant chest pain or pressure. · You are severely dizzy or lightheaded. · You are confused or can't think clearly. · Your face and lips have a blue color. · You pass out (lose consciousness) or are very hard to wake up. Call your doctor now if you develop symptoms such as:  · Shortness of breath. · Fever. · Cough. If you need to get care, call ahead to the doctor's office for instructions before you go. Make sure you wear a face cover to prevent exposing other people to the virus. Where can you get the latest information? The following health organizations are tracking and studying this virus. Their websites contain the most up-to-date information. Elizabeth Garza also learn what to do if you think you may have been exposed to the virus. · U.S. Centers for Disease Control and Prevention (CDC): The CDC provides updated news about the disease and travel advice. The website also tells you how to prevent the spread of infection.  www.cdc.gov  · World Health Organization [facility]): WHO offers information about the virus outbreaks. WHO also has travel advice. www.who.int  Current as of: May 8, 2020               Content Version: 12.5  © 2006-2020 [employer]wise[employer]. Care instructions adapted under license by [employer] (which disclaims liability or warranty for this information). If you have questions about a medical condition or this instruction, always ask your healthcare professional. [place] [address] any warranty or liability for your use of this information. yes

## 2022-03-23 ENCOUNTER — OFFICE VISIT (OUTPATIENT)
Dept: FAMILY MEDICINE CLINIC | Age: 42
End: 2022-03-23
Payer: COMMERCIAL

## 2022-03-23 DIAGNOSIS — Z87.39 HISTORY OF GOUT: ICD-10-CM

## 2022-03-23 DIAGNOSIS — N52.9 ERECTILE DYSFUNCTION, UNSPECIFIED ERECTILE DYSFUNCTION TYPE: ICD-10-CM

## 2022-03-23 DIAGNOSIS — I10 ESSENTIAL HYPERTENSION: ICD-10-CM

## 2022-03-23 DIAGNOSIS — E66.01 OBESITY, MORBID (HCC): ICD-10-CM

## 2022-03-23 DIAGNOSIS — R09.02 HYPOXIA: Primary | ICD-10-CM

## 2022-03-23 DIAGNOSIS — E78.2 MIXED HYPERLIPIDEMIA: ICD-10-CM

## 2022-03-23 PROCEDURE — 99214 OFFICE O/P EST MOD 30 MIN: CPT | Performed by: NURSE PRACTITIONER

## 2022-03-23 RX ORDER — ATORVASTATIN CALCIUM 20 MG/1
20 TABLET, FILM COATED ORAL DAILY
Qty: 90 TABLET | Refills: 0 | Status: SHIPPED | OUTPATIENT
Start: 2022-03-23 | End: 2022-09-16 | Stop reason: SDUPTHER

## 2022-03-23 RX ORDER — SILDENAFIL 50 MG/1
TABLET, FILM COATED ORAL
Qty: 30 TABLET | Refills: 2 | Status: CANCELLED | OUTPATIENT
Start: 2022-03-23

## 2022-03-23 RX ORDER — ALLOPURINOL 100 MG/1
300 TABLET ORAL DAILY
Qty: 270 TABLET | Refills: 0 | Status: SHIPPED | OUTPATIENT
Start: 2022-03-23 | End: 2022-09-16 | Stop reason: SDUPTHER

## 2022-03-23 RX ORDER — LISINOPRIL AND HYDROCHLOROTHIAZIDE 12.5; 2 MG/1; MG/1
1 TABLET ORAL DAILY
Qty: 90 TABLET | Refills: 0 | Status: SHIPPED | OUTPATIENT
Start: 2022-03-23 | End: 2022-06-23 | Stop reason: SDUPTHER

## 2022-03-23 RX ORDER — AMLODIPINE BESYLATE 10 MG/1
10 TABLET ORAL DAILY
Qty: 90 TABLET | Refills: 0 | Status: SHIPPED | OUTPATIENT
Start: 2022-03-23 | End: 2022-09-16 | Stop reason: SDUPTHER

## 2022-03-23 NOTE — PROGRESS NOTES
Peri Metcalf presents today for   Chief Complaint   Patient presents with    Hypertension     4 month follow-up       Is someone accompanying this pt? no    Is the patient using any DME equipment during OV? no    Depression Screening:  3 most recent PHQ Screens 11/17/2021   PHQ Not Done -   Little interest or pleasure in doing things Not at all   Feeling down, depressed, irritable, or hopeless Not at all   Total Score PHQ 2 0       Learning Assessment:  Learning Assessment 9/10/2019   PRIMARY LEARNER Patient   HIGHEST LEVEL OF EDUCATION - PRIMARY LEARNER  GRADUATED HIGH SCHOOL OR GED   BARRIERS PRIMARY LEARNER NONE   CO-LEARNER CAREGIVER No   PRIMARY LANGUAGE ENGLISH   LEARNER PREFERENCE PRIMARY DEMONSTRATION   ANSWERED BY patient   RELATIONSHIP SELF       Health Maintenance reviewed and discussed and ordered per Provider. Health Maintenance Due   Topic Date Due    Lipid Screen  03/08/2022   . Coordination of Care:  1. Have you been to the ER, urgent care clinic since your last visit? Hospitalized since your last visit? no    2. Have you seen or consulted any other health care providers outside of the [address] [place] [address] since your last visit? Include any pap smears or colon screening.  no

## 2022-03-23 NOTE — PROGRESS NOTES
Michele Fraga is a 35 y.o. male presenting today for Hypertension (4 month follow-up)  . Chief Complaint   Patient presents with    Hypertension     4 month follow-up       Hypertension     :  Michele Fraga presents to the office today for hypertension follow-up care. His was asked to follow-up today for hypertension elevated BP reading. He reports his blood pressure is controlled in his home environment. He denies any chest pain, palpitation, headache or dizziness. His BP is elevated today 154/100. He did take his medications this AM.    Hyperlipidemia-patient requesting refill  for his a atorvastatin. He denies any myalgia. He is compliant with the treatment plan. He denies any myalgia. Obesity-16 pound weight gain since last visit. Discussed diet and exercise with the patient. He admits that he drinks quite a bit of alcohol daily. Patient oxygen level has been between 89 to 92%. He is positive for dyspnea with exertion. He has intermittent wheezing. Patient is supposed to be wearing a CPAP machine nightly but he has not been using the machine. ROS    ROS:  History obtained from the patient intake forms which are reviewed with the patient  · General:15 lb weight gain  · HEENT: no sore throat, nasal congestion, vision problems or ear problems  · Respiratory: no cough, shortness of breath, or wheezing  · Cardiovascular: no chest pain, palpitations, or dyspnea on exertion  · Gastrointestinal: no abdominal pain, N/V, change in bowel habits, or black or bloody stools  · Musculoskeletal: no back pain, joint pain, joint stiffness, muscle pain or muscle weakness  · Neurological: no numbness, tingling, headache or dizziness  · Endo:  No polyuria or polydipsia. · : no hematuria, dysuria, frequency, hesitancy, or nocturia.     · Psychological: negative for - anxiety, depression, sleep disturbances, suicidal or homicidal ideations    No Known Allergies    PHQ Screening   3 most recent PHQ Screens 11/17/2021   PHQ Not Done -   Little interest or pleasure in doing things Not at all   Feeling down, depressed, irritable, or hopeless Not at all   Total Score PHQ 2 0       History  Past Medical History:   Diagnosis Date    Elevated uric acid in blood 03/05/2015    8.6 mg/dL    Gout     Hyperlipidemia     Hypertension     Medical non-compliance     Morbid obesity ([facility] [address]     Renal insufficiency 04/06/2011    BUN/sCr: 14/1.4, GFR >60    Right knee pain 09/20/2010    Plain Film NEG    Right knee pain 04/10/2015    Plain Film NEG       Past Surgical History:   Procedure Laterality Date    HX CYST REMOVAL      HX TONSILLECTOMY         Social History     Socioeconomic History    Marital status: SINGLE     Spouse name: Not on file    Number of children: Not on file    Years of education: Not on file    Highest education level: Not on file   Occupational History    Not on file   Tobacco Use    Smoking status: Never Smoker    Smokeless tobacco: Never Used   Substance and Sexual Activity    Alcohol use: Yes     Comment: socially    Drug use: No    Sexual activity: Yes     Partners: Female     Birth control/protection: Condom   Other Topics Concern    Not on file   Social History Narrative    Not on file     Social Determinants of Health     Financial Resource Strain:     Difficulty of Paying Living Expenses: Not on file   Food Insecurity:     Worried About Running Out of Food in the Last Year: Not on file    Jus of Food in the Last Year: Not on file   Transportation Needs:     Lack of Transportation (Medical): Not on file    Lack of Transportation (Non-Medical):  Not on file   Physical Activity:     Days of Exercise per Week: Not on file    Minutes of Exercise per Session: Not on file   Stress:     Feeling of Stress : Not on file   Social Connections:     Frequency of Communication with Friends and Family: Not on file    Frequency of Social Gatherings with Friends and Family: Not on file    Attends Rastafarian Services: Not on file    Active Member of Clubs or Organizations: Not on file    Attends Club or Organization Meetings: Not on file    Marital Status: Not on file   Intimate Partner Violence:     Fear of Current or Ex-Partner: Not on file    Emotionally Abused: Not on file    Physically Abused: Not on file    Sexually Abused: Not on file   Housing Stability:     Unable to Pay for Housing in the Last Year: Not on file    Number of [place] in the Last Year: Not on file    Unstable Housing in the Last Year: Not on file       Current Outpatient Medications   Medication Sig Dispense Refill    allopurinoL (ZYLOPRIM) 100 mg tablet Take 3 Tablets by mouth daily. 270 Tablet 0    amLODIPine (NORVASC) 10 mg tablet Take 1 Tablet by mouth daily. 90 Tablet 0    atorvastatin (LIPITOR) 20 mg tablet Take 1 Tablet by mouth daily. 90 Tablet 0    lisinopril-hydroCHLOROthiazide (PRINZIDE, ZESTORETIC) 20-12.5 mg per tablet Take 1 Tablet by mouth daily. 90 Tablet 0    sildenafil citrate (Viagra) 50 mg tablet One tablet once daily 1 hour (range: 30 minutes to 4 hours) before sexual activity as needed 30 Tablet 2    cpap machine kit by Does Not Apply route. 1 Kit 0         Vitals:    03/23/22 0730 03/23/22 0804   BP: (!) 172/101 (!) 154/100   Pulse: 72    Resp: 16    Temp: (!) 96 °F (35.6 °C)    TempSrc: Oral    SpO2: 92%    Weight: (!) 354 lb (160.6 kg)    Height: 6' (1.829 m)    PainSc:   0 - No pain      Patient Care Team:  Patient Care Team:  Carry SAMANTHA Arredondo as PCP - General (Nurse Practitioner)  Carry SAMANTHA Arredondo [facility]        LABS:  None new to review    RADIOLOGY:  None new to review      Physical Exam  Vitals and nursing note reviewed. Constitutional:       Appearance: He is obese. Cardiovascular:      Rate and Rhythm: Normal rate and regular rhythm. Pulses: Normal pulses. Heart sounds: Normal heart sounds.    Pulmonary: Effort: Pulmonary effort is normal.      Breath sounds: Normal breath sounds. Skin:     General: Skin is warm and dry. Neurological:      Mental Status: He is alert. Vitals:    03/23/22 0730 03/23/22 0804   BP: (!) 172/101 (!) 154/100   Pulse: 72    Resp: 16    Temp: (!) 96 °F (35.6 °C)    TempSrc: Oral    SpO2: 92%    Weight: (!) 354 lb (160.6 kg)    Height: 6' (1.829 m)    PainSc:   0 - No pain         Follow-up and Dispositions    · Return in about 3 months (around 6/23/2022). Physical Exam  Vitals and nursing note reviewed. Constitutional:       Appearance: He is obese. Cardiovascular:      Rate and Rhythm: Normal rate and regular rhythm. Pulses: Normal pulses. Heart sounds: Normal heart sounds. Pulmonary:      Effort: Pulmonary effort is normal.      Breath sounds: Normal breath sounds. Skin:     General: Skin is warm and dry. Neurological:      Mental Status: He is alert. No visits with results within 3 Month(s) from this visit. Latest known visit with results is:   Hospital Outpatient Visit on 03/08/2021   Component Date Value Ref Range Status    Hepatitis C virus Ab 03/08/2021 0.08  <0.80 Index Final    Hep C virus Ab Interp. 03/08/2021 Negative  NEG   Final    Hep C  virus Ab comment 03/08/2021        Final    Comment: Index <0.80. ......................... Meryle Sandman Negative  Index > or = to 0.80 and <1.00. .... Meryle Sandman Meryle Sandman Equivocal  Index >1.00. ......................... Meryle Sandman Positive          For Equivocal or Positive results, confirmation with Hepatitis C RNA by PCR or bDNA is suggested.       WBC 03/08/2021 6.0  4.6 - 13.2 K/uL Final    RBC 03/08/2021 5.41  4.70 - 5.50 M/uL Final    HGB 03/08/2021 15.2  13.0 - 16.0 g/dL Final    HCT 03/08/2021 44.9  36.0 - 48.0 % Final    MCV 03/08/2021 83.0  74.0 - 97.0 FL Final    MCH 03/08/2021 28.1  24.0 - 34.0 PG Final    MCHC 03/08/2021 33.9  31.0 - 37.0 g/dL Final    RDW 03/08/2021 13.9  11.6 - 14.5 % Final    PLATELET 03/08/2021 289  135 - 420 K/uL Final    MPV 03/08/2021 10.7  9.2 - 11.8 FL Final    NEUTROPHILS 03/08/2021 49  40 - 73 % Final    LYMPHOCYTES 03/08/2021 35  21 - 52 % Final    MONOCYTES 03/08/2021 11* 3 - 10 % Final    EOSINOPHILS 03/08/2021 5  0 - 5 % Final    BASOPHILS 03/08/2021 0  0 - 2 % Final    ABS. NEUTROPHILS 03/08/2021 2.9  1.8 - 8.0 K/UL Final    ABS. LYMPHOCYTES 03/08/2021 2.1  0.9 - 3.6 K/UL Final    ABS. MONOCYTES 03/08/2021 0.7  0.05 - 1.2 K/UL Final    ABS. EOSINOPHILS 03/08/2021 0.3  0.0 - 0.4 K/UL Final    ABS. BASOPHILS 03/08/2021 0.0  0.0 - 0.1 K/UL Final    DF 03/08/2021 AUTOMATED    Final    LIPID PROFILE 03/08/2021        Final    Cholesterol, total 03/08/2021 155  <200 MG/DL Final    Triglyceride 03/08/2021 168* <150 MG/DL Final    Comment: The drugs N-acetylcysteine (NAC) and  Metamiszole have been found to cause falsely  low results in this chemical assay. Please  be sure to submit blood samples obtained  BEFORE administration of either of these  drugs to assure correct results.       HDL Cholesterol 03/08/2021 39* 40 - 60 MG/DL Final    LDL, calculated 03/08/2021 82.4  0 - 100 MG/DL Final    VLDL, calculated 03/08/2021 33.6  MG/DL Final    CHOL/HDL Ratio 03/08/2021 4.0  0 - 5.0   Final    Sodium 03/08/2021 141  136 - 145 mmol/L Final    Potassium 03/08/2021 4.2  3.5 - 5.5 mmol/L Final    Chloride 03/08/2021 104  100 - 111 mmol/L Final    CO2 03/08/2021 31  21 - 32 mmol/L Final    Anion gap 03/08/2021 6  3.0 - 18 mmol/L Final    Glucose 03/08/2021 75  74 - 99 mg/dL Final    BUN 03/08/2021 11  7.0 - 18 MG/DL Final    Creatinine 03/08/2021 1.00  0.6 - 1.3 MG/DL Final    BUN/Creatinine ratio 03/08/2021 11* 12 - 20   Final    GFR est AA 03/08/2021 >60  >60 ml/min/1.73m2 Final    GFR est non-AA 03/08/2021 >60  >60 ml/min/1.73m2 Final    Comment: (NOTE)  Estimated GFR is calculated using the Modification of Diet in Renal   Disease (MDRD) Study equation, reported for both  Americans   (GFRAA) and non- Americans (GFRNA), and normalized to 1.73m2   body surface area. The physician must decide which value applies to   the patient. The MDRD study equation should only be used in   individuals age 25 or older. It has not been validated for the   following: pregnant women, patients with serious comorbid conditions,   or on certain medications, or persons with extremes of body size,   muscle mass, or nutritional status.  Calcium 03/08/2021 9.2  8.5 - 10.1 MG/DL Final    Bilirubin, total 03/08/2021 0.5  0.2 - 1.0 MG/DL Final    ALT (SGPT) 03/08/2021 53  16 - 61 U/L Final    AST (SGOT) 03/08/2021 37  10 - 38 U/L Final    Alk. phosphatase 03/08/2021 71  45 - 117 U/L Final    Protein, total 03/08/2021 8.1  6.4 - 8.2 g/dL Final    Albumin 03/08/2021 4.0  3.4 - 5.0 g/dL Final    Globulin 03/08/2021 4.1* 2.0 - 4.0 g/dL Final    A-G Ratio 03/08/2021 1.0  0.8 - 1.7   Final       No results found for any visits on 03/23/22. Patient Care Team:  Patient Care Team:  Lm Wan NP as PCP - General (Nurse Practitioner)  Lm Wan NP as PCP - [facility] Provider      Assessment / Plan:      ICD-10-CM ICD-9-CM    1. Hypoxia  R09.02 799.02 REFERRAL TO PULMONARY DISEASE   2. History of gout  Z87.39 V12.29 allopurinoL (ZYLOPRIM) 100 mg tablet   3. Essential hypertension  I10 401.9 amLODIPine (NORVASC) 10 mg tablet      lisinopril-hydroCHLOROthiazide (PRINZIDE, ZESTORETIC) 20-12.5 mg per tablet   4. Mixed hyperlipidemia  E78.2 272.2 atorvastatin (LIPITOR) 20 mg tablet   5. Erectile dysfunction, unspecified erectile dysfunction type  N52.9 607.84    6. Obesity, morbid ([facility] [address]  E66.01 278.01 REFERRAL TO PULMONARY DISEASE   Obesity-discussed diet, exercise management plan of hypertension-uncontrolled. Continue current treatment plan. Denies any side effects or adverse reactions to the medications. Hypertension-elevated today.   BP medication taken at 7 a.m  Hyperlipidemia-previous lipid panel was March, 2021. Continue atorvastatin  Fasting labs ordered  Follow-up and Dispositions    · Return in about 3 months (around 6/23/2022). I asked the patient if he  had any questions and answered his  questions. The patient stated that he understands the treatment plan and agrees with the treatment plan    This document was created with a voice activated dictation system and may contain transcription errors.

## 2022-04-10 VITALS
WEIGHT: 315 LBS | HEIGHT: 72 IN | BODY MASS INDEX: 42.66 KG/M2 | SYSTOLIC BLOOD PRESSURE: 154 MMHG | RESPIRATION RATE: 16 BRPM | OXYGEN SATURATION: 92 % | HEART RATE: 72 BPM | DIASTOLIC BLOOD PRESSURE: 100 MMHG | TEMPERATURE: 96 F

## 2022-05-11 NOTE — PROGRESS NOTES
HISTORY OF PRESENT ILLNESS  Naveed Moran is a 35 y.o. male referred for hypoxemia. Pt is a never smoker who has complained of easy fatigue and note of mild hypoxemia during a PCP office visit. Pt denies any cough, chest pain or wheezing. He does complain of easy fatigue and daytime hypersomnolence since gaining 70 lbs during the pandemic. Other complaints include fragmented sleep, headache on awakening, and daytime hypersomnolence, admitting that he has fallen asleep at stoplights occasionally while driving. He also complains of erectile dysfunction and blames his medications for this. Of note, he states that BP has been difficult to control lately. Outside records review show a sleep study in 2019 performed by Dr. Zak Frederick with a diagnosis of severe sleep apnea, .6 and hypoxemia to 76%. Pt was started on CPAP at 14 cwp but admits to noncompliance with CPAP over the last 2-3 months. Pt is a non-smoker who drinks a 6 pack of beer nightly. He denies recreational drug use. 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 Review of Systems   Constitutional: Positive for malaise/fatigue. Negative for chills, diaphoresis, fever and weight loss. 70 lb weight gain in 1 year   HENT: Negative for congestion, ear discharge, ear pain, hearing loss, nosebleeds, sinus pain, sore throat and tinnitus. Eyes: Negative for blurred vision, double vision, photophobia, pain, discharge and redness. Respiratory: Negative for cough, hemoptysis, sputum production, shortness of breath, wheezing and stridor. Cardiovascular: Negative for chest pain, palpitations, orthopnea, claudication, leg swelling and PND. Gastrointestinal: Negative for abdominal pain, blood in stool, constipation, diarrhea, heartburn, melena, nausea and vomiting. Genitourinary: Negative for dysuria, flank pain, frequency, hematuria and urgency.    Musculoskeletal: Positive for back pain, falls and joint pain. Negative for myalgias and neck pain. Skin: Negative for itching and rash. Neurological: Negative for dizziness, tingling, tremors, sensory change, speech change, focal weakness, seizures, loss of consciousness, weakness and headaches. Endo/Heme/Allergies: Negative for environmental allergies and polydipsia. Does not bruise/bleed easily. Psychiatric/Behavioral: Negative for depression, hallucinations, memory loss and suicidal ideas. Substance abuse: alcohol. The patient is not nervous/anxious and does not have insomnia. Past Medical History:   Diagnosis Date    Elevated uric acid in blood 03/05/2015    8.6 mg/dL    Gout     Hyperlipidemia     Hypertension     Medical non-compliance     Morbid obesity ([facility] [address]     Renal insufficiency 04/06/2011    BUN/sCr: 14/1.4, GFR >60    Right knee pain 09/20/2010    Plain Film NEG    Right knee pain 04/10/2015    Plain Film NEG     Past Surgical History:   Procedure Laterality Date    HX CYST REMOVAL      HX TONSILLECTOMY       Current Outpatient Medications on File Prior to Visit   Medication Sig Dispense Refill    allopurinoL (ZYLOPRIM) 100 mg tablet Take 3 Tablets by mouth daily. 270 Tablet 0    amLODIPine (NORVASC) 10 mg tablet Take 1 Tablet by mouth daily. 90 Tablet 0    atorvastatin (LIPITOR) 20 mg tablet Take 1 Tablet by mouth daily. 90 Tablet 0    lisinopril-hydroCHLOROthiazide (PRINZIDE, ZESTORETIC) 20-12.5 mg per tablet Take 1 Tablet by mouth daily. 90 Tablet 0    cpap machine kit by Does Not Apply route. 1 Kit 0    sildenafil citrate (Viagra) 50 mg tablet One tablet once daily 1 hour (range: 30 minutes to 4 hours) before sexual activity as needed 30 Tablet 2     No current facility-administered medications on file prior to visit.      No Known Allergies  Family History   Problem Relation Age of Onset    Hypertension Mother     Gout Mother     Hypertension Father      Social History     Socioeconomic History    Marital status: SINGLE     Spouse name: Not on file    Number of children: Not on file    Years of education: Not on file    Highest education level: Not on file   Occupational History    Not on file   Tobacco Use    Smoking status: Never Smoker    Smokeless tobacco: Never Used   Substance and Sexual Activity    Alcohol use: Yes     Alcohol/week: 42.0 standard drinks     Types: 42 Cans of beer per week     Comment: drinks a 6 pack nightly    Drug use: No    Sexual activity: Yes     Partners: Female     Birth control/protection: Condom   Other Topics Concern    Not on file   Social History Narrative    Not on file     Social Determinants of Health     Financial Resource Strain:     Difficulty of Paying Living Expenses: Not on file   Food Insecurity:     Worried About Running Out of Food in the Last Year: Not on file    Jus of Food in the Last Year: Not on file   Transportation Needs:     Lack of Transportation (Medical): Not on file    Lack of Transportation (Non-Medical):  Not on file   Physical Activity:     Days of Exercise per Week: Not on file    Minutes of Exercise per Session: Not on file   Stress:     Feeling of Stress : Not on file   Social Connections:     Frequency of Communication with Friends and Family: Not on file    Frequency of Social Gatherings with Friends and Family: Not on file    Attends Voodoo Services: Not on file    Active Member of [address] [place] [address] or Organizations: Not on file    Attends Club or Organization Meetings: Not on file    Marital Status: Not on file   Intimate Partner Violence:     Fear of Current or Ex-Partner: Not on file    Emotionally Abused: Not on file    Physically Abused: Not on file    Sexually Abused: Not on file   Housing Stability:     Unable to Pay for Housing in the Last Year: Not on file    Number of [place] in the Last Year: Not on file    Unstable Housing in the Last Year: Not on file     Blood pressure (!) 143/82, pulse 70, temperature 97.7 °F (36.5 °C), temperature source Temporal, resp. rate 18, height 6' (1.829 m), weight (!) 161 kg (355 lb), SpO2 93 %. Physical Exam  Constitutional:       General: He is not in acute distress. Appearance: He is obese. He is not ill-appearing, toxic-appearing or diaphoretic. HENT:      Head: Normocephalic and atraumatic. Right Ear: External ear normal.      Left Ear: External ear normal.      Nose:      Comments: Deferred      Mouth/Throat:      Comments: Deferred   Eyes:      General: No scleral icterus. Right eye: No discharge. Left eye: No discharge. Extraocular Movements: Extraocular movements intact. Conjunctiva/sclera: Conjunctivae normal.      Pupils: Pupils are equal, round, and reactive to light. Neck:      Vascular: No carotid bruit. Cardiovascular:      Rate and Rhythm: Normal rate and regular rhythm. Pulses: Normal pulses. Heart sounds: Normal heart sounds. No murmur heard. No gallop. Pulmonary:      Effort: Pulmonary effort is normal. No respiratory distress. Breath sounds: Normal breath sounds. No stridor. No wheezing, rhonchi or rales. Chest:      Chest wall: No tenderness. Abdominal:      General: There is no distension. Palpations: Abdomen is soft. There is no mass. Musculoskeletal:         General: No swelling, tenderness, deformity or signs of injury. Cervical back: No rigidity or tenderness. Right lower leg: Right lower leg edema: trace. Left lower leg: Left lower leg edema: trace. Lymphadenopathy:      Cervical: No cervical adenopathy. Skin:     General: Skin is warm and dry. Coloration: Skin is not jaundiced or pale. Findings: No bruising, erythema, lesion or rash. Neurological:      General: No focal deficit present. Mental Status: He is alert and oriented to person, place, and time.       Coordination: Coordination normal.   Psychiatric:         Mood and Affect: Mood normal.         Behavior: Behavior normal.         Thought Content: Thought content normal.         Judgment: Judgment normal.       CT Results (most recent):  Results from [place] encounter on 04/05/11    CTA CHEST W AND W/O CONTRAST    Narrative  Ordering MD: Varinder Brennan PA-C  Interpreted By: Kj Samayoa MD  ** FINAL **  ---------------------------------------------------------------------  Procedure Date:  04/06/2011   Accession Number:  [id number]  Order No:   [id number]  Procedure:   CTV - CTA CHEST W/WO CONTRAST  CPT Code:   76986  Admit Diag:   PAIN IN SIDE  Reason:   NO REASON GIVEN  INTERPRETATION:  CT angiogram of pulmonary arteries, CT chest with 125 mL Optiray-320  IV contrast.  Axial CT images were obtained. Sagittal and coronal MIP images of  pulmonary arteries were generated. No evidence of pulmonary embolism. Aorta is normal in caliber with  no dissection. No mediastinal or hilar mass. Mild cardiomegaly with  no pericardial effusion or thickening. Lungs are clear. No  pneumothorax or pleural effusion. Soft tissue density in the  anterior mediastinum, likely minimal residual thymic tissues. Upper  abdomen suggest right renal cyst.  IMPRESSION: No evidence of pulmonary embolism, aortic dissection or  acute abnormalities. ASSESSMENT and PLAN  Encounter Diagnoses   Name Primary?  Hypoxia Yes    Obstructive sleep apnea hypopnea, severe     Noncompliance with CPAP treatment     Morbid obesity with BMI of 45.0-49.9, adult (HCC)      Pt with hypoxemia, likely related to severe SOWMYA-obesity hypoventilation syndrome, effects of which are aggravated by 70 lb weight gain and noncompliance with PAP therapy. Doubt parenchymal lung disease meghan given a normal CXR in 2020 and no recent history of pulmonary infections, etc.  Cannot rule out pulmonary hypertension given bipedal pitting edema and untreated SOWMYA-OHS.   Schedule full PFT and Echocardiogram.  Extensive counseling on healthy weight and lifestyle, meghan abstinence from alcohol. Explained sequelae of untreated SOWMYA-OHS including hypertension that is difficult to control. Stressed need for CPAP compliance, but will reassess need for repeat titration in light of weight gain and possible change in pressure requirements. Pt wants to follow with us for sleep issues  RTC 6-8 weeks  Further intervention pending test results. Total time spent during this visit 62 minutes including reviewing outside records, face to face time with interview and examination, counseling.

## 2022-05-12 ENCOUNTER — OFFICE VISIT (OUTPATIENT)
Dept: PULMONOLOGY | Age: 42
End: 2022-05-12
Payer: COMMERCIAL

## 2022-05-12 VITALS
DIASTOLIC BLOOD PRESSURE: 82 MMHG | HEIGHT: 72 IN | SYSTOLIC BLOOD PRESSURE: 143 MMHG | OXYGEN SATURATION: 93 % | RESPIRATION RATE: 18 BRPM | HEART RATE: 70 BPM | WEIGHT: 315 LBS | TEMPERATURE: 97.7 F | BODY MASS INDEX: 42.66 KG/M2

## 2022-05-12 DIAGNOSIS — Z91.14 NONCOMPLIANCE WITH CPAP TREATMENT: ICD-10-CM

## 2022-05-12 DIAGNOSIS — G47.33 OBSTRUCTIVE SLEEP APNEA HYPOPNEA, SEVERE: ICD-10-CM

## 2022-05-12 DIAGNOSIS — R09.02 HYPOXIA: Primary | ICD-10-CM

## 2022-05-12 DIAGNOSIS — E66.01 MORBID OBESITY WITH BMI OF 45.0-49.9, ADULT (HCC): ICD-10-CM

## 2022-05-12 PROCEDURE — 99205 OFFICE O/P NEW HI 60 MIN: CPT | Performed by: INTERNAL MEDICINE

## 2022-05-12 NOTE — PROGRESS NOTES
Lionel Brooks presents today for   Chief Complaint   Patient presents with   [facility] New Patient       Is someone accompanying this pt? no    Is the patient using any DME equipment during OV? CPAP    -DME Company N/A    Depression Screening:  3 most recent PHQ Screens 5/12/2022   PHQ Not Done -   Little interest or pleasure in doing things Not at all   Feeling down, depressed, irritable, or hopeless Not at all   Total Score PHQ 2 0       Learning Assessment:  Learning Assessment 9/10/2019   PRIMARY LEARNER Patient   HIGHEST LEVEL OF EDUCATION - PRIMARY LEARNER  GRADUATED HIGH SCHOOL OR GED   BARRIERS PRIMARY LEARNER NONE   CO-LEARNER CAREGIVER No   PRIMARY LANGUAGE ENGLISH   LEARNER PREFERENCE PRIMARY DEMONSTRATION   ANSWERED BY patient   RELATIONSHIP SELF       Abuse Screening:  Abuse Screening Questionnaire 11/17/2021   Do you ever feel afraid of your partner? N   Are you in a relationship with someone who physically or mentally threatens you? N   Is it safe for you to go home? Y       Fall Risk  No flowsheet data found. Coordination of Care:  1. Have you been to the ER, urgent care clinic since your last visit? Hospitalized since your last visit? no    2. Have you seen or consulted any other health care providers outside of the [address] [place] [address] since your last visit? Include any pap smears or colon screening.  no

## 2022-05-18 ENCOUNTER — OFFICE VISIT (OUTPATIENT)
Dept: FAMILY MEDICINE CLINIC | Age: 42
End: 2022-05-18
Payer: COMMERCIAL

## 2022-05-18 VITALS
SYSTOLIC BLOOD PRESSURE: 142 MMHG | OXYGEN SATURATION: 95 % | HEART RATE: 69 BPM | TEMPERATURE: 97.4 F | DIASTOLIC BLOOD PRESSURE: 88 MMHG | BODY MASS INDEX: 42.66 KG/M2 | HEIGHT: 72 IN | WEIGHT: 315 LBS | RESPIRATION RATE: 18 BRPM

## 2022-05-18 DIAGNOSIS — R22.31 ARM MASS, RIGHT: ICD-10-CM

## 2022-05-18 DIAGNOSIS — N52.9 ERECTILE DYSFUNCTION, UNSPECIFIED ERECTILE DYSFUNCTION TYPE: ICD-10-CM

## 2022-05-18 DIAGNOSIS — I10 ESSENTIAL HYPERTENSION: Primary | ICD-10-CM

## 2022-05-18 PROCEDURE — 99213 OFFICE O/P EST LOW 20 MIN: CPT | Performed by: NURSE PRACTITIONER

## 2022-05-18 RX ORDER — SILDENAFIL 50 MG/1
TABLET, FILM COATED ORAL
Qty: 30 TABLET | Refills: 2 | Status: SHIPPED | OUTPATIENT
Start: 2022-05-18

## 2022-05-18 NOTE — PROGRESS NOTES
Nicola Medrano is a 35 y.o. male that is here for a No chief complaint on file. 1. Have you been to the ER, urgent care clinic since your last visit? Hospitalized since your last visit?no    2. Have you seen or consulted any other health care providers outside of the [address] [place] [address] since your last visit? Include any pap smears or colon screening.  no      Health Maintenance reviewed - yes      Upcoming Appts  no      VORB: No orders of the defined types were placed in this encounter.   Misty Perea NP/ Monica Neri MA

## 2022-05-18 NOTE — PROGRESS NOTES
Labs drawn and sent.      Margaux Mejia is a 35 y.o. male presenting today for Follow Up Chronic Condition and Medication Refill  . Chief Complaint   Patient presents with    Follow Up Chronic Condition    Medication Refill       HPI:  Margaux Mejia presents to the office today for hypertension follow-up care. Patient was seen in the practice approximately 1 month ago and his BP was elevated. He was asked to follow-up today for reevaluation his blood pressure on 3/23/2022 was 154/100 and his BP today is 142/88. He is negative for any chest pain or palpitation. Is also complaining about 3 localized mass to his right upper arm. He denies any pain and notes the masses has been present for several months. .    Review of Systems   Constitutional: Negative for malaise/fatigue. Respiratory: Negative for cough and shortness of breath. Cardiovascular: Negative for chest pain, palpitations and leg swelling. Gastrointestinal: Negative for abdominal pain, nausea and vomiting. Genitourinary: Negative for dysuria. Musculoskeletal: Negative for back pain and myalgias. Neurological: Negative for dizziness and headaches.        No Known Allergies    PHQ Screening   3 most recent PHQ Screens 5/18/2022   PHQ Not Done -   Little interest or pleasure in doing things Not at all   Feeling down, depressed, irritable, or hopeless Not at all   Total Score PHQ 2 0       History  Past Medical History:   Diagnosis Date    Elevated uric acid in blood 03/05/2015    8.6 mg/dL    Gout     Hyperlipidemia     Hypertension     Medical non-compliance     Morbid obesity ([facility] [address]     Renal insufficiency 04/06/2011    BUN/sCr: 14/1.4, GFR >60    Right knee pain 09/20/2010    [employer]    Right knee pain 04/10/2015    [employer] NEG       Past Surgical History:   Procedure Laterality Date    HX CYST REMOVAL      HX TONSILLECTOMY         Social History     Socioeconomic History    Marital status: SINGLE     Spouse name: Not on file  Number of children: Not on file    Years of education: Not on file    Highest education level: Not on file   Occupational History    Not on file   Tobacco Use    Smoking status: Never Smoker    Smokeless tobacco: Never Used   Substance and Sexual Activity    Alcohol use: Yes     Alcohol/week: 42.0 standard drinks     Types: 42 Cans of beer per week     Comment: drinks a 6 pack nightly    Drug use: No    Sexual activity: Yes     Partners: Female     Birth control/protection: Condom   Other Topics Concern    Not on file   Social History Narrative    Not on file     Social Determinants of Health     Financial Resource Strain:     Difficulty of Paying Living Expenses: Not on file   Food Insecurity:     Worried About Running Out of Food in the Last Year: Not on file    Jus of Food in the Last Year: Not on file   Transportation Needs:     Lack of Transportation (Medical): Not on file    Lack of Transportation (Non-Medical):  Not on file   Physical Activity:     Days of Exercise per Week: Not on file    Minutes of Exercise per Session: Not on file   Stress:     Feeling of Stress : Not on file   Social Connections:     Frequency of Communication with Friends and Family: Not on file    Frequency of Social Gatherings with Friends and Family: Not on file    Attends Yazdanism Services: Not on file    Active Member of [address] [place] [address] or Organizations: Not on file    Attends Club or Organization Meetings: Not on file    Marital Status: Not on file   Intimate Partner Violence:     Fear of Current or Ex-Partner: Not on file    Emotionally Abused: Not on file    Physically Abused: Not on file    Sexually Abused: Not on file   Housing Stability:     Unable to Pay for Housing in the Last Year: Not on file    Number of [place] in the Last Year: Not on file    Unstable Housing in the Last Year: Not on file       Current Outpatient Medications   Medication Sig Dispense Refill    sildenafil citrate (Viagra) 50 mg tablet One tablet once daily 1 hour (range: 30 minutes to 4 hours) before sexual activity as needed 30 Tablet 2    allopurinoL (ZYLOPRIM) 100 mg tablet Take 3 Tablets by mouth daily. 270 Tablet 0    amLODIPine (NORVASC) 10 mg tablet Take 1 Tablet by mouth daily. 90 Tablet 0    atorvastatin (LIPITOR) 20 mg tablet Take 1 Tablet by mouth daily. 90 Tablet 0    lisinopril-hydroCHLOROthiazide (PRINZIDE, ZESTORETIC) 20-12.5 mg per tablet Take 1 Tablet by mouth daily. 90 Tablet 0    cpap machine kit by Does Not Apply route. 1 Kit 0         Vitals:    05/18/22 0716 05/18/22 0748   BP: (!) 142/87 (!) 142/88   Pulse: 69    Resp: 18    Temp: 97.4 °F (36.3 °C)    TempSrc: Temporal    SpO2: 95%    Weight: 350 lb (158.8 kg)    Height: 6' (1.829 m)    PainSc:   0 - No pain        Physical Exam  Vitals and nursing note reviewed. Constitutional:       Appearance: Normal appearance. Cardiovascular:      Rate and Rhythm: Normal rate and regular rhythm. Pulses: Normal pulses. Heart sounds: Normal heart sounds. Pulmonary:      Effort: Pulmonary effort is normal.      Breath sounds: Normal breath sounds. Skin:     General: Skin is warm and dry. Neurological:      Mental Status: He is alert. No visits with results within 3 Month(s) from this visit. Latest known visit with results is:   Hospital Outpatient Visit on 03/08/2021   Component Date Value Ref Range Status    Hepatitis C virus Ab 03/08/2021 0.08  <0.80 Index Final    Hep C virus Ab Interp. 03/08/2021 Negative  NEG   Final    Hep C  virus Ab comment 03/08/2021        Final    Comment: Index <0.80. ......................... Rocael Skates Negative  Index > or = to 0.80 and <1.00. .... Rocael Skates Rocael Skates Equivocal  Index >1.00. ......................... Rocael Skates Positive          For Equivocal or Positive results, confirmation with Hepatitis C RNA by PCR or bDNA is suggested.       WBC 03/08/2021 6.0  4.6 - 13.2 K/uL Final    RBC 03/08/2021 5.41  4.70 - 5.50 M/uL Final  HGB 03/08/2021 15.2  13.0 - 16.0 g/dL Final    HCT 03/08/2021 44.9  36.0 - 48.0 % Final    MCV 03/08/2021 83.0  74.0 - 97.0 FL Final    MCH 03/08/2021 28.1  24.0 - 34.0 PG Final    MCHC 03/08/2021 33.9  31.0 - 37.0 g/dL Final    RDW 03/08/2021 13.9  11.6 - 14.5 % Final    PLATELET 70/30/8553 249  135 - 420 K/uL Final    MPV 03/08/2021 10.7  9.2 - 11.8 FL Final    NEUTROPHILS 03/08/2021 49  40 - 73 % Final    LYMPHOCYTES 03/08/2021 35  21 - 52 % Final    MONOCYTES 03/08/2021 11* 3 - 10 % Final    EOSINOPHILS 03/08/2021 5  0 - 5 % Final    BASOPHILS 03/08/2021 0  0 - 2 % Final    ABS. NEUTROPHILS 03/08/2021 2.9  1.8 - 8.0 K/UL Final    ABS. LYMPHOCYTES 03/08/2021 2.1  0.9 - 3.6 K/UL Final    ABS. MONOCYTES 03/08/2021 0.7  0.05 - 1.2 K/UL Final    ABS. EOSINOPHILS 03/08/2021 0.3  0.0 - 0.4 K/UL Final    ABS. BASOPHILS 03/08/2021 0.0  0.0 - 0.1 K/UL Final    DF 03/08/2021 AUTOMATED    Final    LIPID PROFILE 03/08/2021        Final    Cholesterol, total 03/08/2021 155  <200 MG/DL Final    Triglyceride 03/08/2021 168* <150 MG/DL Final    Comment: The drugs N-acetylcysteine (NAC) and  Metamiszole have been found to cause falsely  low results in this chemical assay. Please  be sure to submit blood samples obtained  BEFORE administration of either of these  drugs to assure correct results.       HDL Cholesterol 03/08/2021 39* 40 - 60 MG/DL Final    LDL, calculated 03/08/2021 82.4  0 - 100 MG/DL Final    VLDL, calculated 03/08/2021 33.6  MG/DL Final    CHOL/HDL Ratio 03/08/2021 4.0  0 - 5.0   Final    Sodium 03/08/2021 141  136 - 145 mmol/L Final    Potassium 03/08/2021 4.2  3.5 - 5.5 mmol/L Final    Chloride 03/08/2021 104  100 - 111 mmol/L Final    CO2 03/08/2021 31  21 - 32 mmol/L Final    Anion gap 03/08/2021 6  3.0 - 18 mmol/L Final    Glucose 03/08/2021 75  74 - 99 mg/dL Final    BUN 03/08/2021 11  7.0 - 18 MG/DL Final    Creatinine 03/08/2021 1.00  0.6 - 1.3 MG/DL Final    BUN/Creatinine ratio 03/08/2021 11* 12 - 20   Final    GFR est AA 03/08/2021 >60  >60 ml/min/1.73m2 Final    GFR est non-AA 03/08/2021 >60  >60 ml/min/1.73m2 Final    Comment: (NOTE)  Estimated GFR is calculated using the Modification of Diet in Renal   Disease (MDRD) Study equation, reported for both  Americans   (GFRAA) and non- Americans (GFRNA), and normalized to 1.73m2   body surface area. The physician must decide which value applies to   the patient. The MDRD study equation should only be used in   individuals age 25 or older. It has not been validated for the   following: pregnant women, patients with serious comorbid conditions,   or on certain medications, or persons with extremes of body size,   muscle mass, or nutritional status.  Calcium 03/08/2021 9.2  8.5 - 10.1 MG/DL Final    Bilirubin, total 03/08/2021 0.5  0.2 - 1.0 MG/DL Final    ALT (SGPT) 03/08/2021 53  16 - 61 U/L Final    AST (SGOT) 03/08/2021 37  10 - 38 U/L Final    Alk. phosphatase 03/08/2021 71  45 - 117 U/L Final    Protein, total 03/08/2021 8.1  6.4 - 8.2 g/dL Final    Albumin 03/08/2021 4.0  3.4 - 5.0 g/dL Final    Globulin 03/08/2021 4.1* 2.0 - 4.0 g/dL Final    A-G Ratio 03/08/2021 1.0  0.8 - 1.7   Final       No results found for any visits on 05/18/22. Patient Care Team:  Patient Care Team:  Lauren Khan NP as PCP - General (Nurse Practitioner)  Lauren Khan NP as PCP - [facility] Provider      Assessment / Plan:      ICD-10-CM ICD-9-CM    1. Essential hypertension  I10 401.9    2. Erectile dysfunction, unspecified erectile dysfunction type  N52.9 607.84 sildenafil citrate (Viagra) 50 mg tablet   3. Arm mass, right  R22.31 782.2 US EXT NONVAS RT COMP     Hypertension-no change to the current treatment plan  Medication refills  Follow-up and Dispositions    · Return in about 3 months (around 8/18/2022) for hypertension.      Ultrasound right upper extremity      I asked the patient if he  had any questions and answered his  questions. The patient stated that he understands the treatment plan and agrees with the treatment plan    This document was created with a voice activated dictation system and may contain transcription errors.

## 2022-06-23 ENCOUNTER — APPOINTMENT (OUTPATIENT)
Dept: NON INVASIVE DIAGNOSTICS | Age: 42
End: 2022-06-23
Attending: INTERNAL MEDICINE
Payer: COMMERCIAL

## 2022-06-23 ENCOUNTER — HOSPITAL ENCOUNTER (OUTPATIENT)
Dept: ULTRASOUND IMAGING | Age: 42
Discharge: HOME | End: 2022-06-23
Attending: NURSE PRACTITIONER
Payer: COMMERCIAL

## 2022-06-23 DIAGNOSIS — I10 ESSENTIAL HYPERTENSION: ICD-10-CM

## 2022-06-23 DIAGNOSIS — R22.31 ARM MASS, RIGHT: ICD-10-CM

## 2022-06-23 PROCEDURE — 76882 US LMTD JT/FCL EVL NVASC XTR: CPT

## 2022-06-23 RX ORDER — LISINOPRIL AND HYDROCHLOROTHIAZIDE 12.5; 2 MG/1; MG/1
1 TABLET ORAL DAILY
Qty: 90 TABLET | Refills: 1 | Status: SHIPPED | OUTPATIENT
Start: 2022-06-23 | End: 2022-07-14 | Stop reason: SDUPTHER

## 2022-07-14 DIAGNOSIS — I10 ESSENTIAL HYPERTENSION: ICD-10-CM

## 2022-07-18 RX ORDER — LISINOPRIL AND HYDROCHLOROTHIAZIDE 12.5; 2 MG/1; MG/1
1 TABLET ORAL DAILY
Qty: 90 TABLET | Refills: 0 | Status: SHIPPED | OUTPATIENT
Start: 2022-07-18 | End: 2022-09-16 | Stop reason: SDUPTHER

## 2022-08-08 ENCOUNTER — HOSPITAL ENCOUNTER (OUTPATIENT)
Dept: LAB | Age: 42
Discharge: HOME | End: 2022-08-08
Payer: COMMERCIAL

## 2022-08-08 DIAGNOSIS — E78.2 MIXED HYPERLIPIDEMIA: ICD-10-CM

## 2022-08-08 DIAGNOSIS — I10 HYPERTENSION, UNSPECIFIED TYPE: ICD-10-CM

## 2022-08-08 LAB
ALBUMIN SERPL-MCNC: 3.7 G/DL (ref 3.4–5)
ALBUMIN/GLOB SERPL: 0.9 {RATIO} (ref 0.8–1.7)
ALP SERPL-CCNC: 65 U/L (ref 45–117)
ALT SERPL-CCNC: 52 U/L (ref 16–61)
ANION GAP SERPL CALC-SCNC: 5 MMOL/L (ref 3–18)
AST SERPL-CCNC: 47 U/L (ref 10–38)
BASOPHILS # BLD: 0 K/UL (ref 0–0.1)
BASOPHILS NFR BLD: 0 % (ref 0–2)
BILIRUB SERPL-MCNC: 0.5 MG/DL (ref 0.2–1)
BUN SERPL-MCNC: 11 MG/DL (ref 7–18)
BUN/CREAT SERPL: 10 (ref 12–20)
CALCIUM SERPL-MCNC: 8.4 MG/DL (ref 8.5–10.1)
CHLORIDE SERPL-SCNC: 106 MMOL/L (ref 100–111)
CHOLEST SERPL-MCNC: 185 MG/DL
CO2 SERPL-SCNC: 29 MMOL/L (ref 21–32)
CREAT SERPL-MCNC: 1.12 MG/DL (ref 0.6–1.3)
DIFFERENTIAL METHOD BLD: ABNORMAL
EOSINOPHIL # BLD: 0.2 K/UL (ref 0–0.4)
EOSINOPHIL NFR BLD: 4 % (ref 0–5)
ERYTHROCYTE [DISTWIDTH] IN BLOOD BY AUTOMATED COUNT: 14.6 % (ref 11.6–14.5)
GLOBULIN SER CALC-MCNC: 4.1 G/DL (ref 2–4)
GLUCOSE SERPL-MCNC: 91 MG/DL (ref 74–99)
HCT VFR BLD AUTO: 44.7 % (ref 36–48)
HDLC SERPL-MCNC: 40 MG/DL (ref 40–60)
HDLC SERPL: 4.6 {RATIO} (ref 0–5)
HGB BLD-MCNC: 14.3 G/DL (ref 13–16)
IMM GRANULOCYTES # BLD AUTO: 0 K/UL (ref 0–0.04)
IMM GRANULOCYTES NFR BLD AUTO: 1 % (ref 0–0.5)
LDLC SERPL CALC-MCNC: 106.2 MG/DL (ref 0–100)
LIPID PROFILE,FLP: ABNORMAL
LYMPHOCYTES # BLD: 1.8 K/UL (ref 0.9–3.6)
LYMPHOCYTES NFR BLD: 29 % (ref 21–52)
MCH RBC QN AUTO: 28.1 PG (ref 24–34)
MCHC RBC AUTO-ENTMCNC: 32 G/DL (ref 31–37)
MCV RBC AUTO: 87.8 FL (ref 78–100)
MONOCYTES # BLD: 0.8 K/UL (ref 0.05–1.2)
MONOCYTES NFR BLD: 12 % (ref 3–10)
NEUTS SEG # BLD: 3.4 K/UL (ref 1.8–8)
NEUTS SEG NFR BLD: 54 % (ref 40–73)
NRBC # BLD: 0 K/UL (ref 0–0.01)
NRBC BLD-RTO: 0 PER 100 WBC
PLATELET # BLD AUTO: 264 K/UL (ref 135–420)
PMV BLD AUTO: 10.7 FL (ref 9.2–11.8)
POTASSIUM SERPL-SCNC: 4.2 MMOL/L (ref 3.5–5.5)
PROT SERPL-MCNC: 7.8 G/DL (ref 6.4–8.2)
RBC # BLD AUTO: 5.09 M/UL (ref 4.35–5.65)
SODIUM SERPL-SCNC: 140 MMOL/L (ref 136–145)
TRIGL SERPL-MCNC: 194 MG/DL (ref ?–150)
VLDLC SERPL CALC-MCNC: 38.8 MG/DL
WBC # BLD AUTO: 6.2 K/UL (ref 4.6–13.2)

## 2022-08-08 PROCEDURE — 36415 COLL VENOUS BLD VENIPUNCTURE: CPT

## 2022-08-08 PROCEDURE — 85025 COMPLETE CBC W/AUTO DIFF WBC: CPT

## 2022-08-08 PROCEDURE — 80061 LIPID PANEL: CPT

## 2022-08-08 PROCEDURE — 80053 COMPREHEN METABOLIC PANEL: CPT

## 2022-09-16 ENCOUNTER — OFFICE VISIT (OUTPATIENT)
Dept: FAMILY MEDICINE CLINIC | Age: 42
End: 2022-09-16
Payer: COMMERCIAL

## 2022-09-16 VITALS
HEART RATE: 87 BPM | BODY MASS INDEX: 47.14 KG/M2 | SYSTOLIC BLOOD PRESSURE: 150 MMHG | OXYGEN SATURATION: 9 % | WEIGHT: 315 LBS | DIASTOLIC BLOOD PRESSURE: 100 MMHG

## 2022-09-16 DIAGNOSIS — E78.2 MIXED HYPERLIPIDEMIA: ICD-10-CM

## 2022-09-16 DIAGNOSIS — Z87.39 HISTORY OF GOUT: ICD-10-CM

## 2022-09-16 DIAGNOSIS — Z91.199 NON COMPLIANCE WITH MEDICAL TREATMENT: ICD-10-CM

## 2022-09-16 DIAGNOSIS — I10 ESSENTIAL HYPERTENSION: ICD-10-CM

## 2022-09-16 DIAGNOSIS — G47.33 OSA (OBSTRUCTIVE SLEEP APNEA): ICD-10-CM

## 2022-09-16 DIAGNOSIS — E66.01 OBESITY, MORBID (HCC): Primary | ICD-10-CM

## 2022-09-16 DIAGNOSIS — E66.2 OBESITY HYPOVENTILATION SYNDROME (HCC): ICD-10-CM

## 2022-09-16 PROCEDURE — 99214 OFFICE O/P EST MOD 30 MIN: CPT | Performed by: STUDENT IN AN ORGANIZED HEALTH CARE EDUCATION/TRAINING PROGRAM

## 2022-09-16 RX ORDER — AMLODIPINE BESYLATE 10 MG/1
10 TABLET ORAL DAILY
Qty: 90 TABLET | Refills: 1 | Status: SHIPPED | OUTPATIENT
Start: 2022-09-16

## 2022-09-16 RX ORDER — ATORVASTATIN CALCIUM 20 MG/1
20 TABLET, FILM COATED ORAL DAILY
Qty: 90 TABLET | Refills: 1 | Status: SHIPPED | OUTPATIENT
Start: 2022-09-16

## 2022-09-16 RX ORDER — LISINOPRIL AND HYDROCHLOROTHIAZIDE 12.5; 2 MG/1; MG/1
1 TABLET ORAL DAILY
Qty: 90 TABLET | Refills: 0 | Status: SHIPPED | OUTPATIENT
Start: 2022-09-16

## 2022-09-16 RX ORDER — ALLOPURINOL 100 MG/1
300 TABLET ORAL DAILY
Qty: 270 TABLET | Refills: 1 | Status: SHIPPED | OUTPATIENT
Start: 2022-09-16

## 2022-09-16 NOTE — PATIENT INSTRUCTIONS
DASH Diet: Care Instructions  Your Care Instructions     The DASH diet is an eating plan that can help lower your blood pressure. DASH stands for Dietary Approaches to Stop Hypertension. Hypertension is high blood pressure. The DASH diet focuses on eating foods that are high in calcium, potassium, and magnesium. These nutrients can lower blood pressure. The foods that are highest in these nutrients are fruits, vegetables, low-fat dairy products, nuts, seeds, and legumes. But taking calcium, potassium, and magnesium supplements instead of eating foods that are high in those nutrients does not have the same effect. The DASH diet also includes whole grains, fish, and poultry. The DASH diet is one of several lifestyle changes your doctor may recommend to lower your high blood pressure. Your doctor may also want you to decrease the amount of sodium in your diet. Lowering sodium while following the DASH diet can lower blood pressure even further than just the DASH diet alone. Follow-up care is a key part of your treatment and safety. Be sure to make and go to all appointments, and call your doctor if you are having problems. It's also a good idea to know your test results and keep a list of the medicines you take. How can you care for yourself at home? Following the DASH diet  Eat 4 to 5 servings of fruit each day. A serving is 1 medium-sized piece of fruit, ½ cup chopped or canned fruit, 1/4 cup dried fruit, or 4 ounces (½ cup) of fruit juice. Choose fruit more often than fruit juice. Eat 4 to 5 servings of vegetables each day. A serving is 1 cup of lettuce or raw leafy vegetables, ½ cup of chopped or cooked vegetables, or 4 ounces (½ cup) of vegetable juice. Choose vegetables more often than vegetable juice. Get 2 to 3 servings of low-fat and fat-free dairy each day. A serving is 8 ounces of milk, 1 cup of yogurt, or 1 ½ ounces of cheese. Eat 6 to 8 servings of grains each day.  A serving is 1 slice of bread, 1 ounce of dry cereal, or ½ cup of cooked rice, pasta, or cooked cereal. Try to choose whole-grain products as much as possible. Limit lean meat, poultry, and fish to 2 servings each day. A serving is 3 ounces, about the size of a deck of cards. Eat 4 to 5 servings of nuts, seeds, and legumes (cooked dried beans, lentils, and split peas) each week. A serving is 1/3 cup of nuts, 2 tablespoons of seeds, or ½ cup of cooked beans or peas. Limit fats and oils to 2 to 3 servings each day. A serving is 1 teaspoon of vegetable oil or 2 tablespoons of salad dressing. Limit sweets and added sugars to 5 servings or less a week. A serving is 1 tablespoon jelly or jam, ½ cup sorbet, or 1 cup of lemonade. Eat less than 2,300 milligrams (mg) of sodium a day. If you limit your sodium to 1,500 mg a day, you can lower your blood pressure even more. Be aware that all of these are the suggested number of servings for people who eat 1,800 to 2,000 calories a day. Your recommended number of servings may be different if you need more or fewer calories. Tips for success  Start small. Do not try to make dramatic changes to your diet all at once. You might feel that you are missing out on your favorite foods and then be more likely to not follow the plan. Make small changes, and stick with them. Once those changes become habit, add a few more changes. Try some of the following:  Make it a goal to eat a fruit or vegetable at every meal and at snacks. This will make it easy to get the recommended amount of fruits and vegetables each day. Try yogurt topped with fruit and nuts for a snack or healthy dessert. Add lettuce, tomato, cucumber, and onion to sandwiches. Combine a ready-made pizza crust with low-fat mozzarella cheese and lots of vegetable toppings. Try using tomatoes, squash, spinach, broccoli, carrots, cauliflower, and onions.   Have a variety of cut-up vegetables with a low-fat dip as an appetizer instead of chips and dip.  Sprinkle sunflower seeds or chopped almonds over salads. Or try adding chopped walnuts or almonds to cooked vegetables. Try some vegetarian meals using beans and peas. Add garbanzo or kidney beans to salads. Make burritos and tacos with mashed castillo beans or black beans. Where can you learn more? Go to http://www.gonz[employer].com/  Enter H967 in the search box to learn more about \"DASH Diet: Care Instructions. \"  Current as of: January 10, 2022               Content Version: 13.2  © 1825-7200 [employer]. Care instructions adapted under license by [employer] (which disclaims liability or warranty for this information). If you have questions about a medical condition or this instruction, always ask your healthcare professional. [address] any warranty or liability for your use of this information.

## 2022-09-16 NOTE — PROGRESS NOTES
Canelo Gordillo is a 38 y.o. male presenting today for Establish Care  . Chief Complaint   Patient presents with    Establish Care         HPI:  Canelo Gordillo presents to the office today to establish care. Patient has a past medical history of HTN, HLD, SOWMYA, gout. He has a history of noncompliance. HTN: He is prescribed amlodipine and lisinopril-HCTZ. Takes them intermittently. BP today is significantly elevated at 1 . HLD: Taking Lipitor 20 mg daily. Lipid panel in 8/22 showed , HDL 40, triglyceride 194. He admits to skipping dosages. LDL has increased from 82.4    Gout: Takes allopurinol intermittently. Jason Fisher Has been over a year since he last the attack. SOWMYA: Patient noted to have severe SOWMYA-obesity hypoventilation syndrome. He has been noncompliant with the CPAP and referred to pulmonology where he is undergoing further work-up. He was counseled on CPAP compliance. Echo and PFTs were ordered which are pending. He reports feeling tired and sluggish during the day. Alcohol abuse: Patient admits to drinking 6 pack of beer every night. Noted to have mild elevation of AST. Review of Systems   Constitutional:  Positive for malaise/fatigue. Negative for chills, diaphoresis, fever and weight loss. HENT:  Negative for congestion, ear discharge, ear pain, hearing loss, nosebleeds, sinus pain, sore throat and tinnitus. Eyes:  Negative for blurred vision, double vision and photophobia. Respiratory:  Negative for cough, sputum production, shortness of breath, wheezing and stridor. Cardiovascular:  Negative for chest pain, palpitations, orthopnea, claudication and leg swelling. Gastrointestinal:  Negative for abdominal pain, constipation, diarrhea, heartburn, nausea and vomiting. Genitourinary:  Negative for dysuria, flank pain, frequency, hematuria and urgency. Musculoskeletal:  Positive for joint pain. Negative for back pain, myalgias and neck pain. Skin:  Negative for rash. Neurological:  Negative for tingling, tremors, sensory change, speech change, focal weakness, seizures, weakness and headaches. Psychiatric/Behavioral:  Negative for depression. The patient is not nervous/anxious. All other systems reviewed and are negative. No Known Allergies    PHQ Screening   3 most recent PHQ Screens 5/18/2022   PHQ Not Done -   Little interest or pleasure in doing things Not at all   Feeling down, depressed, irritable, or hopeless Not at all   Total Score PHQ 2 0       History  Past Medical History:   Diagnosis Date    Elevated uric acid in blood 03/05/2015    8.6 mg/dL    Gout     Hyperlipidemia     Hypertension     Medical non-compliance     Morbid obesity ([facility] [address]     Renal insufficiency 04/06/2011    BUN/sCr: 14/1.4, GFR >60    Right knee pain 09/20/2010    Plain Film NEG    Right knee pain 04/10/2015    Plain Film NEG       Past Surgical History:   Procedure Laterality Date    HX CYST REMOVAL      HX TONSILLECTOMY         Social History     Socioeconomic History    Marital status: SINGLE     Spouse name: Not on file    Number of children: Not on file    Years of education: Not on file    Highest education level: Not on file   Occupational History    Not on file   Tobacco Use    Smoking status: Never    Smokeless tobacco: Never   Substance and Sexual Activity    Alcohol use:  Yes     Alcohol/week: 42.0 standard drinks     Types: 42 Cans of beer per week     Comment: drinks a 6 pack nightly    Drug use: No    Sexual activity: Yes     Partners: Female     Birth control/protection: Condom   Other Topics Concern    Not on file   Social History Narrative    Not on file     Social Determinants of Health     Financial Resource Strain: Not on file   Food Insecurity: Not on file   Transportation Needs: Not on file   Physical Activity: Not on file   Stress: Not on file   Social Connections: Not on file   Intimate Partner Violence: Not on file   Housing Stability: Not on file       Current Outpatient Medications   Medication Sig Dispense Refill    amLODIPine (NORVASC) 10 mg tablet Take 1 Tablet by mouth daily. 90 Tablet 1    atorvastatin (LIPITOR) 20 mg tablet Take 1 Tablet by mouth daily. 90 Tablet 1    lisinopril-hydroCHLOROthiazide (PRINZIDE, ZESTORETIC) 20-12.5 mg per tablet Take 1 Tablet by mouth daily. 90 Tablet 0    allopurinoL (ZYLOPRIM) 100 mg tablet Take 3 Tablets by mouth daily. 270 Tablet 1    sildenafil citrate (Viagra) 50 mg tablet One tablet once daily 1 hour (range: 30 minutes to 4 hours) before sexual activity as needed 30 Tablet 2    cpap machine kit by Does Not Apply route. 1 Kit 0         Vitals:    09/16/22 0726   BP: (!) 150/100   Pulse: 87   SpO2: (!) 9%   Weight: 347 lb 9.6 oz (157.7 kg)       Physical Exam  Vitals and nursing note reviewed. Constitutional:       General: He is not in acute distress. Appearance: Normal appearance. He is obese. He is not ill-appearing, toxic-appearing or diaphoretic. HENT:      Head: Normocephalic and atraumatic. Eyes:      General: No scleral icterus. Extraocular Movements: Extraocular movements intact. Conjunctiva/sclera: Conjunctivae normal.      Pupils: Pupils are equal, round, and reactive to light. Cardiovascular:      Rate and Rhythm: Normal rate and regular rhythm. Pulses: Normal pulses. Heart sounds: Normal heart sounds. No murmur heard. Pulmonary:      Effort: Pulmonary effort is normal. No respiratory distress. Breath sounds: Normal breath sounds. No wheezing. Musculoskeletal:         General: No swelling or tenderness. Normal range of motion. Cervical back: Normal range of motion and neck supple. Right lower leg: No edema. Left lower leg: No edema. Skin:     General: Skin is warm and dry. Coloration: Skin is not jaundiced or pale. Neurological:      General: No focal deficit present.       Mental Status: He is alert and oriented to person, place, and time. Mental status is at baseline. Cranial Nerves: No cranial nerve deficit. Motor: No weakness. Gait: Gait normal.   Psychiatric:         Mood and Affect: Mood normal.         Behavior: Behavior normal.         Thought Content: Thought content normal.         Judgment: Judgment normal.       Hospital Outpatient Visit on 08/08/2022   Component Date Value Ref Range Status    LIPID PROFILE 08/08/2022        Final    Cholesterol, total 08/08/2022 185  <200 MG/DL Final    Triglyceride 08/08/2022 194 (A) <150 MG/DL Final    Comment: The drugs N-acetylcysteine (NAC) and  Metamiszole have been found to cause falsely  low results in this chemical assay. Please  be sure to submit blood samples obtained  BEFORE administration of either of these  drugs to assure correct results. HDL Cholesterol 08/08/2022 40  40 - 60 MG/DL Final    LDL, calculated 08/08/2022 106.2 (A) 0 - 100 MG/DL Final    VLDL, calculated 08/08/2022 38.8  MG/DL Final    CHOL/HDL Ratio 08/08/2022 4.6  0 - 5.0   Final    Sodium 08/08/2022 140  136 - 145 mmol/L Final    Potassium 08/08/2022 4.2  3.5 - 5.5 mmol/L Final    Chloride 08/08/2022 106  100 - 111 mmol/L Final    CO2 08/08/2022 29  21 - 32 mmol/L Final    Anion gap 08/08/2022 5  3.0 - 18 mmol/L Final    Glucose 08/08/2022 91  74 - 99 mg/dL Final    BUN 08/08/2022 11  7.0 - 18 MG/DL Final    Creatinine 08/08/2022 1.12  0.6 - 1.3 MG/DL Final    BUN/Creatinine ratio 08/08/2022 10 (A) 12 - 20   Final    GFR est AA 08/08/2022 >60  >60 ml/min/1.73m2 Final    GFR est non-AA 08/08/2022 >60  >60 ml/min/1.73m2 Final    Comment: (NOTE)  Estimated GFR is calculated using the Modification of Diet in Renal   Disease (MDRD) Study equation, reported for both  Americans   (GFRAA) and non- Americans (GFRNA), and normalized to 1.73m2   body surface area. The physician must decide which value applies to   the patient.  The MDRD study equation should only be used in   individuals age 25 or older. It has not been validated for the   following: pregnant women, patients with serious comorbid conditions,   or on certain medications, or persons with extremes of body size,   muscle mass, or nutritional status. Calcium 08/08/2022 8.4 (A) 8.5 - 10.1 MG/DL Final    Bilirubin, total 08/08/2022 0.5  0.2 - 1.0 MG/DL Final    ALT (SGPT) 08/08/2022 52  16 - 61 U/L Final    AST (SGOT) 08/08/2022 47 (A) 10 - 38 U/L Final    Alk. phosphatase 08/08/2022 65  45 - 117 U/L Final    Protein, total 08/08/2022 7.8  6.4 - 8.2 g/dL Final    Albumin 08/08/2022 3.7  3.4 - 5.0 g/dL Final    Globulin 08/08/2022 4.1 (A) 2.0 - 4.0 g/dL Final    A-G Ratio 08/08/2022 0.9  0.8 - 1.7   Final    WBC 08/08/2022 6.2  4.6 - 13.2 K/uL Final    RBC 08/08/2022 5.09  4.35 - 5.65 M/uL Final    HGB 08/08/2022 14.3  13.0 - 16.0 g/dL Final    HCT 08/08/2022 44.7  36.0 - 48.0 % Final    MCV 08/08/2022 87.8  78.0 - 100.0 FL Final    MCH 08/08/2022 28.1  24.0 - 34.0 PG Final    MCHC 08/08/2022 32.0  31.0 - 37.0 g/dL Final    RDW 08/08/2022 14.6 (A) 11.6 - 14.5 % Final    PLATELET 23/78/0855 722  135 - 420 K/uL Final    MPV 08/08/2022 10.7  9.2 - 11.8 FL Final    NRBC 08/08/2022 0.0  0  WBC Final    ABSOLUTE NRBC 08/08/2022 0.00  0.00 - 0.01 K/uL Final    NEUTROPHILS 08/08/2022 54  40 - 73 % Final    LYMPHOCYTES 08/08/2022 29  21 - 52 % Final    MONOCYTES 08/08/2022 12 (A) 3 - 10 % Final    EOSINOPHILS 08/08/2022 4  0 - 5 % Final    BASOPHILS 08/08/2022 0  0 - 2 % Final    IMMATURE GRANULOCYTES 08/08/2022 1 (A) 0.0 - 0.5 % Final    ABS. NEUTROPHILS 08/08/2022 3.4  1.8 - 8.0 K/UL Final    ABS. LYMPHOCYTES 08/08/2022 1.8  0.9 - 3.6 K/UL Final    ABS. MONOCYTES 08/08/2022 0.8  0.05 - 1.2 K/UL Final    ABS. EOSINOPHILS 08/08/2022 0.2  0.0 - 0.4 K/UL Final    ABS. BASOPHILS 08/08/2022 0.0  0.0 - 0.1 K/UL Final    ABS. IMM.  GRANS. 08/08/2022 0.0  0.00 - 0.04 K/UL Final    DF 08/08/2022 AUTOMATED    Final       No results found for any visits on 09/16/22. Patient Care Team:  Patient Care Team:  María Cosme NP as PCP - General (Nurse Practitioner)      Assessment / Plan:      ICD-10-CM ICD-9-CM    1. Obesity, morbid ([address]  E66.01 278.01       2. Essential hypertension  I10 401.9 amLODIPine (NORVASC) 10 mg tablet      lisinopril-hydroCHLOROthiazide (PRINZIDE, ZESTORETIC) 20-12.5 mg per tablet      3. Mixed hyperlipidemia  E78.2 272.2 atorvastatin (LIPITOR) 20 mg tablet      4. History of gout  Z87.39 V12.29 allopurinoL (ZYLOPRIM) 100 mg tablet      5. SOWMYA (obstructive sleep apnea)  G47.33 327.23       6. Obesity hypoventilation syndrome (HCC)  E66.2 278.03       7. Non compliance with medical treatment  Z91.19 V15.81         Labs reviewed and discussed with patient. Patient presents today to establish care. Reports intermittent noncompliance with treatment plan. Emphasized compliance to patient. HTN: Continue amlodipine and lisinopril-HCTZ. BP is elevated today. Recheck at next visit. Counseled on DASH diet. HLD: Continue Lipitor. LDL is elevated as compared to prior-likely because patient has not been taking the medication consistently. Gout: Stable on allopurinol. SOWMYA: Emphasized that patient needs to use CPAP daily. He is now following with pulmonology. PFTs and echo are pending-patient advised to follow-up. Next visit: BP check, vaccines      Follow-up and Dispositions    Return in about 8 weeks (around 11/11/2022). I asked the patient if he  had any questions and answered his  questions. The patient stated that he understands the treatment plan and agrees with the treatment plan    This document was created with a voice activated dictation system and may contain transcription errors.

## 2022-11-10 ENCOUNTER — OFFICE VISIT (OUTPATIENT)
Dept: PULMONOLOGY | Age: 42
End: 2022-11-10
Payer: COMMERCIAL

## 2022-11-10 VITALS — WEIGHT: 315 LBS | HEIGHT: 72 IN | BODY MASS INDEX: 42.66 KG/M2

## 2022-11-10 DIAGNOSIS — G47.33 OBSTRUCTIVE SLEEP APNEA HYPOPNEA, SEVERE: ICD-10-CM

## 2022-11-10 DIAGNOSIS — R09.02 HYPOXIA: Primary | ICD-10-CM

## 2022-11-10 PROCEDURE — 94727 GAS DIL/WSHOT DETER LNG VOL: CPT | Performed by: INTERNAL MEDICINE

## 2022-11-10 PROCEDURE — 94729 DIFFUSING CAPACITY: CPT | Performed by: INTERNAL MEDICINE

## 2022-11-10 PROCEDURE — 94060 EVALUATION OF WHEEZING: CPT | Performed by: INTERNAL MEDICINE

## 2022-11-16 ENCOUNTER — OFFICE VISIT (OUTPATIENT)
Dept: FAMILY MEDICINE CLINIC | Age: 42
End: 2022-11-16
Payer: COMMERCIAL

## 2022-11-16 VITALS
DIASTOLIC BLOOD PRESSURE: 100 MMHG | OXYGEN SATURATION: 93 % | RESPIRATION RATE: 18 BRPM | HEIGHT: 72 IN | WEIGHT: 315 LBS | BODY MASS INDEX: 42.66 KG/M2 | HEART RATE: 71 BPM | SYSTOLIC BLOOD PRESSURE: 162 MMHG

## 2022-11-16 DIAGNOSIS — F10.10 ALCOHOL ABUSE: ICD-10-CM

## 2022-11-16 DIAGNOSIS — G47.33 OSA (OBSTRUCTIVE SLEEP APNEA): ICD-10-CM

## 2022-11-16 DIAGNOSIS — N52.9 ERECTILE DYSFUNCTION, UNSPECIFIED ERECTILE DYSFUNCTION TYPE: ICD-10-CM

## 2022-11-16 DIAGNOSIS — E66.01 OBESITY, MORBID (HCC): ICD-10-CM

## 2022-11-16 DIAGNOSIS — I10 ESSENTIAL HYPERTENSION: Primary | ICD-10-CM

## 2022-11-16 PROCEDURE — 3078F DIAST BP <80 MM HG: CPT | Performed by: STUDENT IN AN ORGANIZED HEALTH CARE EDUCATION/TRAINING PROGRAM

## 2022-11-16 PROCEDURE — 99214 OFFICE O/P EST MOD 30 MIN: CPT | Performed by: STUDENT IN AN ORGANIZED HEALTH CARE EDUCATION/TRAINING PROGRAM

## 2022-11-16 PROCEDURE — 3074F SYST BP LT 130 MM HG: CPT | Performed by: STUDENT IN AN ORGANIZED HEALTH CARE EDUCATION/TRAINING PROGRAM

## 2022-11-16 RX ORDER — SILDENAFIL 50 MG/1
TABLET, FILM COATED ORAL
Qty: 30 TABLET | Refills: 2 | Status: SHIPPED | OUTPATIENT
Start: 2022-11-16

## 2022-11-16 RX ORDER — LISINOPRIL AND HYDROCHLOROTHIAZIDE 20; 25 MG/1; MG/1
1 TABLET ORAL DAILY
Qty: 90 TABLET | Refills: 1 | Status: SHIPPED | OUTPATIENT
Start: 2022-11-16

## 2022-11-16 NOTE — PROGRESS NOTES
Toshia Turner is a 38 y.o. male presenting today for Follow Up Chronic Condition (Follow up. Not interested in Flu vaccine. Needs med refills. Denies any other concerns. )  . Chief Complaint   Patient presents with    Follow Up Chronic Condition     Follow up. Not interested in Flu vaccine. Needs med refills. Denies any other concerns. HPI:  Toshia Turner presents to the office today for follow up. Patient has a past medical history of HTN, HLD, SOWMYA, gout. He has a history of noncompliance. HTN: He is prescribed amlodipine and lisinopril-HCTZ. Takes them intermittently. BP today is significantly elevated. Mega Ayala He had 3 energy drinks last night. He has been skipping doses. HLD: Taking Lipitor 20 mg daily. Lipid panel in 8/22 showed , HDL 40, triglyceride 194. He admits to skipping dosages. LDL has increased from 82.4    Gout: Takes allopurinol intermittently. Mega Ayala Has been over a year since he last the attack. SOWMYA: Patient noted to have severe SOWMYA-obesity hypoventilation syndrome. He has been noncompliant with the CPAP and referred to pulmonology where he is undergoing further work-up. He was counseled on CPAP compliance. He reports feeling tired and sluggish during the day. He has been trying to lose weight. Alcohol abuse: Patient admits to drinking 6 pack of beer every night. Noted to have mild elevation of AST. Review of Systems   Constitutional:  Positive for malaise/fatigue. Negative for chills, diaphoresis, fever and weight loss. HENT:  Negative for congestion, ear discharge, ear pain, hearing loss, nosebleeds, sinus pain, sore throat and tinnitus. Eyes:  Negative for blurred vision, double vision and photophobia. Respiratory:  Negative for cough, sputum production, shortness of breath, wheezing and stridor. Cardiovascular:  Negative for chest pain, palpitations, orthopnea, claudication and leg swelling.    Gastrointestinal:  Negative for abdominal pain, constipation, diarrhea, heartburn, nausea and vomiting. Genitourinary:  Negative for dysuria, flank pain, frequency, hematuria and urgency. Musculoskeletal:  Positive for joint pain. Negative for back pain, myalgias and neck pain. Skin:  Negative for rash. Neurological:  Negative for tingling, tremors, sensory change, speech change, focal weakness, seizures, weakness and headaches. Psychiatric/Behavioral:  Negative for depression. The patient is not nervous/anxious. All other systems reviewed and are negative. No Known Allergies    PHQ Screening   3 most recent PHQ Screens 11/16/2022   PHQ Not Done -   Little interest or pleasure in doing things Several days   Feeling down, depressed, irritable, or hopeless Several days   Total Score PHQ 2 2       History  Past Medical History:   Diagnosis Date    Elevated uric acid in blood 03/05/2015    8.6 mg/dL    Gout     Hyperlipidemia     Hypertension     Medical non-compliance     Morbid obesity ([facility] [address]     Renal insufficiency 04/06/2011    BUN/sCr: 14/1.4, GFR >60    Right knee pain 09/20/2010    Plain Film NEG    Right knee pain 04/10/2015    Plain Film NEG       Past Surgical History:   Procedure Laterality Date    HX CYST REMOVAL      HX TONSILLECTOMY         Social History     Socioeconomic History    Marital status: SINGLE     Spouse name: Not on file    Number of children: Not on file    Years of education: Not on file    Highest education level: Not on file   Occupational History    Not on file   Tobacco Use    Smoking status: Never    Smokeless tobacco: Never   Substance and Sexual Activity    Alcohol use:  Yes     Alcohol/week: 42.0 standard drinks     Types: 42 Cans of beer per week     Comment: drinks a 6 pack nightly    Drug use: No    Sexual activity: Yes     Partners: Female     Birth control/protection: Condom   Other Topics Concern    Not on file   Social History Narrative    Not on file     Social Determinants of Health Financial Resource Strain: Not on file   Food Insecurity: Not on file   Transportation Needs: Not on file   Physical Activity: Not on file   Stress: Not on file   Social Connections: Not on file   Intimate Partner Violence: Not on file   Housing Stability: Not on file       Current Outpatient Medications   Medication Sig Dispense Refill    sildenafil citrate (Viagra) 50 mg tablet One tablet once daily 1 hour (range: 30 minutes to 4 hours) before sexual activity as needed 30 Tablet 2    lisinopril-hydroCHLOROthiazide (PRINZIDE, ZESTORETIC) 20-25 mg per tablet Take 1 Tablet by mouth daily. 90 Tablet 1    amLODIPine (NORVASC) 10 mg tablet Take 1 Tablet by mouth daily. 90 Tablet 1    atorvastatin (LIPITOR) 20 mg tablet Take 1 Tablet by mouth daily. 90 Tablet 1    allopurinoL (ZYLOPRIM) 100 mg tablet Take 3 Tablets by mouth daily. 270 Tablet 1    cpap machine kit by Does Not Apply route. 1 Kit 0         Vitals:    11/16/22 0648 11/16/22 0706   BP: (!) 152/94 (!) 162/100   Pulse: 71    Resp: 18    SpO2: 93%    Weight: 346 lb (156.9 kg)    Height: 6' (1.829 m)    PainSc:   0 - No pain        Physical Exam  Vitals and nursing note reviewed. Constitutional:       General: He is not in acute distress. Appearance: Normal appearance. He is obese. He is not ill-appearing, toxic-appearing or diaphoretic. HENT:      Head: Normocephalic and atraumatic. Eyes:      General: No scleral icterus. Extraocular Movements: Extraocular movements intact. Conjunctiva/sclera: Conjunctivae normal.      Pupils: Pupils are equal, round, and reactive to light. Cardiovascular:      Rate and Rhythm: Normal rate and regular rhythm. Pulses: Normal pulses. Heart sounds: Normal heart sounds. No murmur heard. Pulmonary:      Effort: Pulmonary effort is normal. No respiratory distress. Breath sounds: Normal breath sounds. No wheezing. Musculoskeletal:         General: No swelling or tenderness.  Normal range of motion. Cervical back: Normal range of motion and neck supple. Right lower leg: No edema. Left lower leg: No edema. Skin:     General: Skin is warm and dry. Coloration: Skin is not jaundiced or pale. Neurological:      General: No focal deficit present. Mental Status: He is alert and oriented to person, place, and time. Mental status is at baseline. Cranial Nerves: No cranial nerve deficit. Motor: No weakness. Gait: Gait normal.   Psychiatric:         Mood and Affect: Mood normal.         Behavior: Behavior normal.         Thought Content: Thought content normal.         Judgment: Judgment normal.       No visits with results within 3 Month(s) from this visit. Latest known visit with results is:   Hospital Outpatient Visit on 08/08/2022   Component Date Value Ref Range Status    LIPID PROFILE 08/08/2022        Final    Cholesterol, total 08/08/2022 185  <200 MG/DL Final    Triglyceride 08/08/2022 194 (A)  <150 MG/DL Final    Comment: The drugs N-acetylcysteine (NAC) and  Metamiszole have been found to cause falsely  low results in this chemical assay. Please  be sure to submit blood samples obtained  BEFORE administration of either of these  drugs to assure correct results.       HDL Cholesterol 08/08/2022 40  40 - 60 MG/DL Final    LDL, calculated 08/08/2022 106.2 (A)  0 - 100 MG/DL Final    VLDL, calculated 08/08/2022 38.8  MG/DL Final    CHOL/HDL Ratio 08/08/2022 4.6  0 - 5.0   Final    Sodium 08/08/2022 140  136 - 145 mmol/L Final    Potassium 08/08/2022 4.2  3.5 - 5.5 mmol/L Final    Chloride 08/08/2022 106  100 - 111 mmol/L Final    CO2 08/08/2022 29  21 - 32 mmol/L Final    Anion gap 08/08/2022 5  3.0 - 18 mmol/L Final    Glucose 08/08/2022 91  74 - 99 mg/dL Final    BUN 08/08/2022 11  7.0 - 18 MG/DL Final    Creatinine 08/08/2022 1.12  0.6 - 1.3 MG/DL Final    BUN/Creatinine ratio 08/08/2022 10 (A)  12 - 20   Final    GFR est AA 08/08/2022 >60  >60 ml/min/1.73m2 Final    GFR est non-AA 08/08/2022 >60  >60 ml/min/1.73m2 Final    Comment: (NOTE)  Estimated GFR is calculated using the Modification of Diet in Renal   Disease (MDRD) Study equation, reported for both  Americans   (GFRAA) and non- Americans (GFRNA), and normalized to 1.73m2   body surface area. The physician must decide which value applies to   the patient. The MDRD study equation should only be used in   individuals age 25 or older. It has not been validated for the   following: pregnant women, patients with serious comorbid conditions,   or on certain medications, or persons with extremes of body size,   muscle mass, or nutritional status. Calcium 08/08/2022 8.4 (A)  8.5 - 10.1 MG/DL Final    Bilirubin, total 08/08/2022 0.5  0.2 - 1.0 MG/DL Final    ALT (SGPT) 08/08/2022 52  16 - 61 U/L Final    AST (SGOT) 08/08/2022 47 (A)  10 - 38 U/L Final    Alk.  phosphatase 08/08/2022 65  45 - 117 U/L Final    Protein, total 08/08/2022 7.8  6.4 - 8.2 g/dL Final    Albumin 08/08/2022 3.7  3.4 - 5.0 g/dL Final    Globulin 08/08/2022 4.1 (A)  2.0 - 4.0 g/dL Final    A-G Ratio 08/08/2022 0.9  0.8 - 1.7   Final    WBC 08/08/2022 6.2  4.6 - 13.2 K/uL Final    RBC 08/08/2022 5.09  4.35 - 5.65 M/uL Final    HGB 08/08/2022 14.3  13.0 - 16.0 g/dL Final    HCT 08/08/2022 44.7  36.0 - 48.0 % Final    MCV 08/08/2022 87.8  78.0 - 100.0 FL Final    MCH 08/08/2022 28.1  24.0 - 34.0 PG Final    MCHC 08/08/2022 32.0  31.0 - 37.0 g/dL Final    RDW 08/08/2022 14.6 (A)  11.6 - 14.5 % Final    PLATELET 68/60/4327 332  135 - 420 K/uL Final    MPV 08/08/2022 10.7  9.2 - 11.8 FL Final    NRBC 08/08/2022 0.0  0  WBC Final    ABSOLUTE NRBC 08/08/2022 0.00  0.00 - 0.01 K/uL Final    NEUTROPHILS 08/08/2022 54  40 - 73 % Final    LYMPHOCYTES 08/08/2022 29  21 - 52 % Final    MONOCYTES 08/08/2022 12 (A)  3 - 10 % Final    EOSINOPHILS 08/08/2022 4  0 - 5 % Final    BASOPHILS 08/08/2022 0  0 - 2 % Final    IMMATURE GRANULOCYTES 08/08/2022 1 (A)  0.0 - 0.5 % Final    ABS. NEUTROPHILS 08/08/2022 3.4  1.8 - 8.0 K/UL Final    ABS. LYMPHOCYTES 08/08/2022 1.8  0.9 - 3.6 K/UL Final    ABS. MONOCYTES 08/08/2022 0.8  0.05 - 1.2 K/UL Final    ABS. EOSINOPHILS 08/08/2022 0.2  0.0 - 0.4 K/UL Final    ABS. BASOPHILS 08/08/2022 0.0  0.0 - 0.1 K/UL Final    ABS. IMM. GRANS. 08/08/2022 0.0  0.00 - 0.04 K/UL Final    DF 08/08/2022 AUTOMATED    Final       No results found for any visits on 11/16/22. Patient Care Team:  Patient Care Team:  Velma White NP as PCP - General (Nurse Practitioner)      Assessment / Plan:      ICD-10-CM ICD-9-CM    1. Essential hypertension  I10 401.9 lisinopril-hydroCHLOROthiazide (PRINZIDE, ZESTORETIC) 20-25 mg per tablet      2. Erectile dysfunction, unspecified erectile dysfunction type  N52.9 607.84 sildenafil citrate (Viagra) 50 mg tablet      3. Obesity, morbid ([address]  E66.01 278.01       4. SOWMYA (obstructive sleep apnea)  G47.33 327.23       5. Alcohol abuse  F10.10 305.00         Counseled on medication and CPAP compliance. HTN: Continue amlodipine and lisinopril. Increase HCTZ. BP is elevated today. Recheck at next visit. Counseled on DASH diet. HLD: Continue Lipitor. LDL is elevated as compared to prior-likely because patient has not been taking the medication consistently. Alcohol abuse: Counseled on cutting down. Gout: Stable on allopurinol. ED: Refill Viagra    SOWMYA: Emphasized that patient needs to use CPAP daily. He is now following with pulmonology. PFTs completed. Advised to schedule follow up appt with pulmonology. Refused flu shot. Next visit: BP check, vaccines      Follow-up and Dispositions    Return in about 3 months (around 2/16/2023) for BP check. I asked the patient if he  had any questions and answered his  questions.   The patient stated that he understands the treatment plan and agrees with the treatment plan    This document was created with a voice activated dictation system and may contain transcription errors.

## 2023-02-28 ENCOUNTER — OFFICE VISIT (OUTPATIENT)
Facility: CLINIC | Age: 43
End: 2023-02-28
Payer: COMMERCIAL

## 2023-02-28 VITALS
RESPIRATION RATE: 18 BRPM | SYSTOLIC BLOOD PRESSURE: 158 MMHG | DIASTOLIC BLOOD PRESSURE: 100 MMHG | HEART RATE: 71 BPM | OXYGEN SATURATION: 95 % | HEIGHT: 72 IN | BODY MASS INDEX: 42.66 KG/M2 | WEIGHT: 315 LBS

## 2023-02-28 DIAGNOSIS — E66.01 MORBID (SEVERE) OBESITY DUE TO EXCESS CALORIES (HCC): ICD-10-CM

## 2023-02-28 DIAGNOSIS — G47.33 OSA (OBSTRUCTIVE SLEEP APNEA): ICD-10-CM

## 2023-02-28 DIAGNOSIS — I10 ESSENTIAL (PRIMARY) HYPERTENSION: ICD-10-CM

## 2023-02-28 DIAGNOSIS — F10.10 ALCOHOL ABUSE, UNCOMPLICATED: ICD-10-CM

## 2023-02-28 DIAGNOSIS — Z91.199 NON COMPLIANCE WITH MEDICAL TREATMENT: ICD-10-CM

## 2023-02-28 DIAGNOSIS — M10.9 ACUTE GOUT INVOLVING TOE OF RIGHT FOOT, UNSPECIFIED CAUSE: Primary | ICD-10-CM

## 2023-02-28 DIAGNOSIS — E78.2 MIXED HYPERLIPIDEMIA: ICD-10-CM

## 2023-02-28 PROCEDURE — 3077F SYST BP >= 140 MM HG: CPT | Performed by: STUDENT IN AN ORGANIZED HEALTH CARE EDUCATION/TRAINING PROGRAM

## 2023-02-28 PROCEDURE — 99214 OFFICE O/P EST MOD 30 MIN: CPT | Performed by: STUDENT IN AN ORGANIZED HEALTH CARE EDUCATION/TRAINING PROGRAM

## 2023-02-28 PROCEDURE — 3080F DIAST BP >= 90 MM HG: CPT | Performed by: STUDENT IN AN ORGANIZED HEALTH CARE EDUCATION/TRAINING PROGRAM

## 2023-02-28 RX ORDER — PREDNISONE 20 MG/1
40 TABLET ORAL DAILY
Qty: 10 TABLET | Refills: 0 | Status: SHIPPED | OUTPATIENT
Start: 2023-02-28 | End: 2023-03-05

## 2023-02-28 RX ORDER — LOSARTAN POTASSIUM AND HYDROCHLOROTHIAZIDE 25; 100 MG/1; MG/1
1 TABLET ORAL DAILY
Qty: 90 TABLET | Refills: 0 | Status: SHIPPED | OUTPATIENT
Start: 2023-02-28

## 2023-02-28 RX ORDER — ATORVASTATIN CALCIUM 20 MG/1
20 TABLET, FILM COATED ORAL DAILY
Qty: 90 TABLET | Refills: 1 | Status: SHIPPED | OUTPATIENT
Start: 2023-02-28

## 2023-02-28 ASSESSMENT — ENCOUNTER SYMPTOMS
RHINORRHEA: 0
NAUSEA: 0
VOMITING: 0
BACK PAIN: 0
CHEST TIGHTNESS: 0
SHORTNESS OF BREATH: 0
DIARRHEA: 0
ABDOMINAL PAIN: 0
CONSTIPATION: 0

## 2023-02-28 ASSESSMENT — ANXIETY QUESTIONNAIRES
6. BECOMING EASILY ANNOYED OR IRRITABLE: 0
7. FEELING AFRAID AS IF SOMETHING AWFUL MIGHT HAPPEN: 0
3. WORRYING TOO MUCH ABOUT DIFFERENT THINGS: 0
5. BEING SO RESTLESS THAT IT IS HARD TO SIT STILL: 0
GAD7 TOTAL SCORE: 0
IF YOU CHECKED OFF ANY PROBLEMS ON THIS QUESTIONNAIRE, HOW DIFFICULT HAVE THESE PROBLEMS MADE IT FOR YOU TO DO YOUR WORK, TAKE CARE OF THINGS AT HOME, OR GET ALONG WITH OTHER PEOPLE: NOT DIFFICULT AT ALL
4. TROUBLE RELAXING: 0
2. NOT BEING ABLE TO STOP OR CONTROL WORRYING: 0
1. FEELING NERVOUS, ANXIOUS, OR ON EDGE: 0

## 2023-02-28 ASSESSMENT — PATIENT HEALTH QUESTIONNAIRE - PHQ9
SUM OF ALL RESPONSES TO PHQ9 QUESTIONS 1 & 2: 0
SUM OF ALL RESPONSES TO PHQ QUESTIONS 1-9: 0
SUM OF ALL RESPONSES TO PHQ QUESTIONS 1-9: 0
2. FEELING DOWN, DEPRESSED OR HOPELESS: 0
SUM OF ALL RESPONSES TO PHQ QUESTIONS 1-9: 0
SUM OF ALL RESPONSES TO PHQ QUESTIONS 1-9: 0
1. LITTLE INTEREST OR PLEASURE IN DOING THINGS: 0

## 2023-02-28 NOTE — PROGRESS NOTES
Kahlil Zhou is a 42 y.o. male presenting today for Follow-up Chronic Condition (Did not receive atorvastatin when he refilled meds. Knot on side of right foot great toe, is swelling, unable to wear certain shoes, this has been bothering him for about 4 days, has been taking ibuprofen for relief. Is not currently seeing a podiatrist. )  .     Chief Complaint   Patient presents with    Follow-up Chronic Condition     Did not receive atorvastatin when he refilled meds. Knot on side of right foot great toe, is swelling, unable to wear certain shoes, this has been bothering him for about 4 days, has been taking ibuprofen for relief. Is not currently seeing a podiatrist.        HPI:  Kahlil Zhou presents to the office today for follow up.      Patient has a past medical history of HTN, HLD, CAMELIA, gout.     He has a history of noncompliance.  He admits to not using the CPAP regularly.  He also admits to skipping his medications.     HTN: He is prescribed amlodipine and lisinopril-HCTZ.  Takes them intermittently.  BP today is significantly elevated..  Continues to add salt to food, drink energy drinks and alcohol.     HLD: Taking Lipitor 20 mg daily.  Lipid panel in 8/22 showed , HDL 40, triglyceride 194. He admits to skipping dosages. LDL has increased from 82.4     Gout: Takes allopurinol intermittently..    CAMELIA: Patient noted to have severe CAMELIA-obesity hypoventilation syndrome.  He has been noncompliant with the CPAP and referred to pulmonology where he is undergoing further work-up.  He was counseled on CPAP compliance.    He reports feeling tired and sluggish during the day.   He has not followed up with pulmonology since his appointment in 5/22.       Alcohol abuse: Patient admits to drinking 6 pack of beer every night.  Noted to have mild elevation of AST.     Today, patient is complaining of pain in his right foot at right toe.  This started a few days ago and has progressively worsened.   Patient has been unable to wear any other shoes asides from slippers. He has been taking ibuprofen/Aleve with mild relief. Review of Systems   Constitutional:  Positive for fatigue. Negative for activity change, appetite change and fever. HENT:  Negative for congestion, ear pain, postnasal drip and rhinorrhea. Respiratory:  Negative for chest tightness and shortness of breath. Cardiovascular:  Negative for chest pain, palpitations and leg swelling. Gastrointestinal:  Negative for abdominal pain, constipation, diarrhea, nausea and vomiting. Endocrine: Negative for cold intolerance, heat intolerance, polydipsia, polyphagia and polyuria. Genitourinary:  Negative for decreased urine volume, difficulty urinating, dysuria, enuresis, frequency and urgency. Musculoskeletal:  Positive for joint swelling. Negative for arthralgias, back pain, gait problem and neck pain. Right toe pain. Neurological:  Negative for tremors, seizures, syncope, speech difficulty, weakness, light-headedness and headaches. Psychiatric/Behavioral:  Negative for confusion, decreased concentration, dysphoric mood and self-injury. The patient is not nervous/anxious and is not hyperactive. All other systems reviewed and are negative. Not on File    PHQ Screening   No flowsheet data found.     History  Past Medical History:   Diagnosis Date    Elevated uric acid in blood 03/05/2015    8.6 mg/dL    Gout     Hyperlipidemia     Hypertension     Medical non-compliance     Morbid obesity ([facility] [address]     Renal insufficiency 04/06/2011    BUN/sCr: 14/1.4, GFR >60    Right knee pain 04/10/2015    [employer] NEG    Right knee pain 09/20/2010    Plain [employer] NEG       Past Surgical History:   Procedure Laterality Date    CYST REMOVAL      TONSILLECTOMY         Social History     Socioeconomic History    Marital status: Single     Spouse name: Not on file    Number of children: Not on file    Years of education: Not on file    Highest education level: Not on file   Occupational History    Not on file   Tobacco Use    Smoking status: Never    Smokeless tobacco: Never   Vaping Use    Vaping Use: Never used   Substance and Sexual Activity    Alcohol use: Yes     Alcohol/week: 42.0 standard drinks    Drug use: No    Sexual activity: Not on file   Other Topics Concern    Not on file   Social History Narrative    Not on file     Social Determinants of Health     Financial Resource Strain: Not on file   Food Insecurity: Not on file   Transportation Needs: Not on file   Physical Activity: Not on file   Stress: Not on file   Social Connections: Not on file   Intimate Partner Violence: Not on file   Housing Stability: Not on file       Current Outpatient Medications   Medication Sig Dispense Refill    atorvastatin (LIPITOR) 20 MG tablet Take 1 tablet by mouth daily 90 tablet 1    losartan-hydroCHLOROthiazide (HYZAAR) 100-25 MG per tablet Take 1 tablet by mouth daily Stop Lisinopril-HCTZ 90 tablet 0    predniSONE (DELTASONE) 20 MG tablet Take 2 tablets by mouth daily for 5 days 10 tablet 0    allopurinol (ZYLOPRIM) 100 MG tablet Take 300 mg by mouth daily      amLODIPine (NORVASC) 10 MG tablet Take 10 mg by mouth daily      sildenafil (VIAGRA) 50 MG tablet One tablet once daily 1 hour (range: 30 minutes to 4 hours) before sexual activity as needed       No current facility-administered medications for this visit. BP (!) 158/100   Pulse 71   Resp 18   Ht 6' (1.829 m)   Wt (!) 353 lb (160.1 kg)   SpO2 95%   BMI 47.88 kg/m²      Physical Exam  Vitals and nursing note reviewed. Constitutional:       General: He is not in acute distress. Appearance: Normal appearance. He is obese. He is not ill-appearing, toxic-appearing or diaphoretic. HENT:      Head: Normocephalic and atraumatic. Eyes:      General: No scleral icterus. Extraocular Movements: Extraocular movements intact.       Conjunctiva/sclera: Conjunctivae normal.      Pupils: Pupils are equal, round, and reactive to light. Cardiovascular:      Rate and Rhythm: Normal rate. Pulses: Normal pulses. Heart sounds: Normal heart sounds. No murmur heard. Pulmonary:      Effort: Pulmonary effort is normal. No respiratory distress. Breath sounds: Normal breath sounds. No wheezing or rales. Musculoskeletal:         General: Swelling present. Normal range of motion. Cervical back: Normal range of motion and neck supple. Right lower leg: No edema. Left lower leg: No edema. Comments: Rt big toe - swollen, tender. Skin:     General: Skin is warm and dry. Neurological:      General: No focal deficit present. Mental Status: He is alert and oriented to person, place, and time. Mental status is at baseline. Cranial Nerves: No cranial nerve deficit. Motor: No weakness. Psychiatric:         Mood and Affect: Mood normal.         Behavior: Behavior normal.         Thought Content: Thought content normal.         Judgment: Judgment normal.        No visits with results within 3 Month(s) from this visit. Latest known visit with results is:   Orders Only on 04/06/2011   Component Date Value Ref Range Status    D-Dimer, Quant 04/06/2011 3.60 (A)  <0.46 ug/ml(FEU) Final    Comment: (NOTE)  A D-Dimer result less than 0.5 ug/mL FEU combined with a low clinical   pretest probability of DVT and/or PE has a negative predictive value   of %. The positive predictive value is 50% or less. No results found for any visits on 02/28/23. Patient Care Team:  Patient Care Team:  Zeeshan Perez MD as PCP - Beverly Nazario MD as PCP - [facility]      Assessment / Plan:     Diagnosis Orders   1. Acute gout involving toe of right foot, unspecified cause  Uric Acid    predniSONE (DELTASONE) 20 MG tablet      2. Essential (primary) hypertension  losartan-hydroCHLOROthiazide (HYZAAR) 100-25 MG per tablet      3.  Morbid (severe) obesity due to excess calories (HCC)  Hemoglobin A1C      4. CAMELIA (obstructive sleep apnea)        5. Non compliance with medical treatment        6. Alcohol abuse, uncomplicated  Comprehensive Metabolic Panel      7. Mixed hyperlipidemia  atorvastatin (LIPITOR) 20 MG tablet    Comprehensive Metabolic Panel    Hemoglobin A1C          Patient counseled once again on compliance with his medications and CPAP. He continues to skip dosages and not use the CPAP regularly. Right foot pain: Likely a gout attack. Will prescribe prednisone. Check uric acid. HTN: Continue amlodipine. Switch over to losartan-HCTZ. HLD: Continue Lipitor. LDL is elevated as compared to prior-likely because patient has not been taking the medication consistently. Alcohol abuse: Counseled on cutting down. He continues to drink. Gout: Stable on allopurinol. CAMELIA: Emphasized that patient needs to use CPAP daily. He is now following with pulmonology. PFTs completed. Advised to schedule follow up appt with pulmonology. Refused flu shot. Next visit: BP check, vaccines      Return in about 3 months (around 5/28/2023). I asked the patient if he  had any questions and answered his  questions. The patient stated that he understands the treatment plan and agrees with the treatment plan    This document was created with a voice activated dictation system and may contain transcription errors.

## 2023-05-16 ENCOUNTER — HOSPITAL ENCOUNTER (OUTPATIENT)
Facility: HOSPITAL | Age: 43
Setting detail: SPECIMEN
Discharge: HOME | End: 2023-05-19
Payer: COMMERCIAL

## 2023-05-16 DIAGNOSIS — E66.01 MORBID (SEVERE) OBESITY DUE TO EXCESS CALORIES (HCC): ICD-10-CM

## 2023-05-16 DIAGNOSIS — F10.10 ALCOHOL ABUSE, UNCOMPLICATED: ICD-10-CM

## 2023-05-16 DIAGNOSIS — E78.2 MIXED HYPERLIPIDEMIA: ICD-10-CM

## 2023-05-16 DIAGNOSIS — M10.9 ACUTE GOUT INVOLVING TOE OF RIGHT FOOT, UNSPECIFIED CAUSE: ICD-10-CM

## 2023-05-16 LAB
ALBUMIN SERPL-MCNC: 3.7 G/DL (ref 3.4–5)
ALBUMIN/GLOB SERPL: 0.9 (ref 0.8–1.7)
ALP SERPL-CCNC: 60 U/L (ref 45–117)
ALT SERPL-CCNC: 47 U/L (ref 16–61)
ANION GAP SERPL CALC-SCNC: 6 MMOL/L (ref 3–18)
AST SERPL-CCNC: 43 U/L (ref 10–38)
BILIRUB SERPL-MCNC: 0.5 MG/DL (ref 0.2–1)
BUN SERPL-MCNC: 12 MG/DL (ref 7–18)
BUN/CREAT SERPL: 12 (ref 12–20)
CALCIUM SERPL-MCNC: 8.8 MG/DL (ref 8.5–10.1)
CHLORIDE SERPL-SCNC: 105 MMOL/L (ref 100–111)
CO2 SERPL-SCNC: 27 MMOL/L (ref 21–32)
CREAT SERPL-MCNC: 1 MG/DL (ref 0.6–1.3)
EST. AVERAGE GLUCOSE BLD GHB EST-MCNC: 117 MG/DL
GLOBULIN SER CALC-MCNC: 4 G/DL (ref 2–4)
GLUCOSE SERPL-MCNC: 112 MG/DL (ref 74–99)
HBA1C MFR BLD: 5.7 % (ref 4.2–5.6)
POTASSIUM SERPL-SCNC: 3.8 MMOL/L (ref 3.5–5.5)
PROT SERPL-MCNC: 7.7 G/DL (ref 6.4–8.2)
SODIUM SERPL-SCNC: 138 MMOL/L (ref 136–145)
URATE SERPL-MCNC: 7.8 MG/DL (ref 2.6–7.2)

## 2023-05-16 PROCEDURE — 36415 COLL VENOUS BLD VENIPUNCTURE: CPT

## 2023-05-16 PROCEDURE — 84550 ASSAY OF BLOOD/URIC ACID: CPT

## 2023-05-16 PROCEDURE — 83036 HEMOGLOBIN GLYCOSYLATED A1C: CPT

## 2023-05-16 PROCEDURE — 80053 COMPREHEN METABOLIC PANEL: CPT

## 2023-05-23 ENCOUNTER — OFFICE VISIT (OUTPATIENT)
Facility: CLINIC | Age: 43
End: 2023-05-23
Payer: COMMERCIAL

## 2023-05-23 VITALS
HEIGHT: 73 IN | OXYGEN SATURATION: 95 % | WEIGHT: 315 LBS | SYSTOLIC BLOOD PRESSURE: 137 MMHG | HEART RATE: 70 BPM | RESPIRATION RATE: 16 BRPM | DIASTOLIC BLOOD PRESSURE: 82 MMHG | BODY MASS INDEX: 41.75 KG/M2

## 2023-05-23 DIAGNOSIS — F10.10 ALCOHOL ABUSE, UNCOMPLICATED: ICD-10-CM

## 2023-05-23 DIAGNOSIS — M1A.09X0 CHRONIC GOUT OF MULTIPLE SITES, UNSPECIFIED CAUSE: ICD-10-CM

## 2023-05-23 DIAGNOSIS — E66.01 MORBID (SEVERE) OBESITY DUE TO EXCESS CALORIES (HCC): ICD-10-CM

## 2023-05-23 DIAGNOSIS — I10 ESSENTIAL (PRIMARY) HYPERTENSION: Primary | ICD-10-CM

## 2023-05-23 DIAGNOSIS — E78.2 MIXED HYPERLIPIDEMIA: ICD-10-CM

## 2023-05-23 DIAGNOSIS — N52.9 ERECTILE DYSFUNCTION, UNSPECIFIED ERECTILE DYSFUNCTION TYPE: ICD-10-CM

## 2023-05-23 PROCEDURE — 99214 OFFICE O/P EST MOD 30 MIN: CPT | Performed by: STUDENT IN AN ORGANIZED HEALTH CARE EDUCATION/TRAINING PROGRAM

## 2023-05-23 PROCEDURE — 3075F SYST BP GE 130 - 139MM HG: CPT | Performed by: STUDENT IN AN ORGANIZED HEALTH CARE EDUCATION/TRAINING PROGRAM

## 2023-05-23 PROCEDURE — 3079F DIAST BP 80-89 MM HG: CPT | Performed by: STUDENT IN AN ORGANIZED HEALTH CARE EDUCATION/TRAINING PROGRAM

## 2023-05-23 RX ORDER — AMLODIPINE BESYLATE 10 MG/1
10 TABLET ORAL DAILY
Qty: 90 TABLET | Refills: 1 | Status: SHIPPED | OUTPATIENT
Start: 2023-05-23

## 2023-05-23 RX ORDER — SILDENAFIL 50 MG/1
TABLET, FILM COATED ORAL
Qty: 30 TABLET | Refills: 2 | Status: SHIPPED | OUTPATIENT
Start: 2023-05-23

## 2023-05-23 RX ORDER — ALLOPURINOL 300 MG/1
300 TABLET ORAL DAILY
Qty: 90 TABLET | Refills: 1 | Status: SHIPPED | OUTPATIENT
Start: 2023-05-23

## 2023-05-23 RX ORDER — ATORVASTATIN CALCIUM 20 MG/1
20 TABLET, FILM COATED ORAL DAILY
Qty: 90 TABLET | Refills: 1 | Status: SHIPPED | OUTPATIENT
Start: 2023-05-23

## 2023-05-23 RX ORDER — LOSARTAN POTASSIUM AND HYDROCHLOROTHIAZIDE 25; 100 MG/1; MG/1
1 TABLET ORAL DAILY
Qty: 90 TABLET | Refills: 1 | Status: SHIPPED | OUTPATIENT
Start: 2023-05-23

## 2023-05-23 SDOH — ECONOMIC STABILITY: FOOD INSECURITY: WITHIN THE PAST 12 MONTHS, YOU WORRIED THAT YOUR FOOD WOULD RUN OUT BEFORE YOU GOT MONEY TO BUY MORE.: SOMETIMES TRUE

## 2023-05-23 SDOH — ECONOMIC STABILITY: FOOD INSECURITY: WITHIN THE PAST 12 MONTHS, THE FOOD YOU BOUGHT JUST DIDN'T LAST AND YOU DIDN'T HAVE MONEY TO GET MORE.: NEVER TRUE

## 2023-05-23 SDOH — ECONOMIC STABILITY: INCOME INSECURITY: HOW HARD IS IT FOR YOU TO PAY FOR THE VERY BASICS LIKE FOOD, HOUSING, MEDICAL CARE, AND HEATING?: NOT VERY HARD

## 2023-05-23 SDOH — ECONOMIC STABILITY: HOUSING INSECURITY
IN THE LAST 12 MONTHS, WAS THERE A TIME WHEN YOU DID NOT HAVE A STEADY PLACE TO SLEEP OR SLEPT IN A SHELTER (INCLUDING NOW)?: NO

## 2023-05-23 ASSESSMENT — ANXIETY QUESTIONNAIRES
IF YOU CHECKED OFF ANY PROBLEMS ON THIS QUESTIONNAIRE, HOW DIFFICULT HAVE THESE PROBLEMS MADE IT FOR YOU TO DO YOUR WORK, TAKE CARE OF THINGS AT HOME, OR GET ALONG WITH OTHER PEOPLE: NOT DIFFICULT AT ALL
5. BEING SO RESTLESS THAT IT IS HARD TO SIT STILL: 0
GAD7 TOTAL SCORE: 0
3. WORRYING TOO MUCH ABOUT DIFFERENT THINGS: 0
7. FEELING AFRAID AS IF SOMETHING AWFUL MIGHT HAPPEN: 0
1. FEELING NERVOUS, ANXIOUS, OR ON EDGE: 0
6. BECOMING EASILY ANNOYED OR IRRITABLE: 0
4. TROUBLE RELAXING: 0
2. NOT BEING ABLE TO STOP OR CONTROL WORRYING: 0

## 2023-05-23 ASSESSMENT — ENCOUNTER SYMPTOMS
BACK PAIN: 0
VOMITING: 0
NAUSEA: 0
DIARRHEA: 0
RHINORRHEA: 0
SHORTNESS OF BREATH: 0
CONSTIPATION: 0
CHEST TIGHTNESS: 0
ABDOMINAL PAIN: 0

## 2023-05-23 ASSESSMENT — PATIENT HEALTH QUESTIONNAIRE - PHQ9
1. LITTLE INTEREST OR PLEASURE IN DOING THINGS: 0
SUM OF ALL RESPONSES TO PHQ QUESTIONS 1-9: 0
2. FEELING DOWN, DEPRESSED OR HOPELESS: 0
SUM OF ALL RESPONSES TO PHQ9 QUESTIONS 1 & 2: 0
SUM OF ALL RESPONSES TO PHQ QUESTIONS 1-9: 0

## 2023-05-23 NOTE — PROGRESS NOTES
Zack Clark is a 38 y.o. male presenting today for Follow-up Chronic Condition  . Chief Complaint   Patient presents with    Follow-up Chronic Condition       HPI:  Zack Clark presents to the office today for follow up. Patient has a past medical history of HTN, HLD, CAMELIA, gout. He has a history of noncompliance. He admits to not using the CPAP regularly. He also admits to skipping his medications. HTN: He is prescribed amlodipine and losartan-HCTZ. He has been taking the medication now more consistently. BP is improved today as compared to prior but he continues to drink alcohol and energy drinks daily. HLD: Taking Lipitor 20 mg daily. Lipid panel in 8/22 showed , HDL 40, triglyceride 194. He admits to skipping dosages. LDL has increased from 82.4     Gout: Takes allopurinol intermittently. No recent gout attack. Uric acid was 7.8 in 5/23. Shirley Cornell CAMELIA: Patient noted to have severe CAMELIA-obesity hypoventilation syndrome. He has been noncompliant with the CPAP and referred to pulmonology where he is undergoing further work-up. He was counseled on CPAP compliance. He reports feeling tired and sluggish during the day. Alcohol abuse: Patient admits to drinking 6 pack of beer every night. Noted to have mild elevation of AST: 43.         Review of Systems   Constitutional:  Positive for fatigue. Negative for activity change, appetite change and fever. HENT:  Negative for congestion, ear pain, postnasal drip and rhinorrhea. Respiratory:  Negative for chest tightness and shortness of breath. Cardiovascular:  Negative for chest pain, palpitations and leg swelling. Gastrointestinal:  Negative for abdominal pain, constipation, diarrhea, nausea and vomiting. Endocrine: Negative for cold intolerance, heat intolerance, polydipsia, polyphagia and polyuria.    Genitourinary:  Negative for decreased urine volume, difficulty urinating, dysuria, enuresis, frequency

## 2023-10-02 ENCOUNTER — HOSPITAL ENCOUNTER (OUTPATIENT)
Facility: HOSPITAL | Age: 43
Setting detail: SPECIMEN
Discharge: HOME | End: 2023-10-05
Payer: COMMERCIAL

## 2023-10-02 DIAGNOSIS — E78.2 MIXED HYPERLIPIDEMIA: ICD-10-CM

## 2023-10-02 DIAGNOSIS — I10 ESSENTIAL (PRIMARY) HYPERTENSION: ICD-10-CM

## 2023-10-02 LAB
ALBUMIN SERPL-MCNC: 3.8 G/DL (ref 3.4–5)
ALBUMIN/GLOB SERPL: 0.9 (ref 0.8–1.7)
ALP SERPL-CCNC: 62 U/L (ref 45–117)
ALT SERPL-CCNC: 44 U/L (ref 16–61)
ANION GAP SERPL CALC-SCNC: 3 MMOL/L (ref 3–18)
AST SERPL-CCNC: 39 U/L (ref 10–38)
BASOPHILS # BLD: 0 K/UL (ref 0–0.1)
BASOPHILS NFR BLD: 1 % (ref 0–2)
BILIRUB SERPL-MCNC: 0.7 MG/DL (ref 0.2–1)
BUN SERPL-MCNC: 16 MG/DL (ref 7–18)
BUN/CREAT SERPL: 14 (ref 12–20)
CALCIUM SERPL-MCNC: 8.9 MG/DL (ref 8.5–10.1)
CHLORIDE SERPL-SCNC: 102 MMOL/L (ref 100–111)
CHOLEST SERPL-MCNC: 197 MG/DL
CO2 SERPL-SCNC: 31 MMOL/L (ref 21–32)
CREAT SERPL-MCNC: 1.14 MG/DL (ref 0.6–1.3)
DIFFERENTIAL METHOD BLD: ABNORMAL
EOSINOPHIL # BLD: 0.2 K/UL (ref 0–0.4)
EOSINOPHIL NFR BLD: 4 % (ref 0–5)
ERYTHROCYTE [DISTWIDTH] IN BLOOD BY AUTOMATED COUNT: 14.1 % (ref 11.6–14.5)
GLOBULIN SER CALC-MCNC: 4.4 G/DL (ref 2–4)
GLUCOSE SERPL-MCNC: 99 MG/DL (ref 74–99)
HCT VFR BLD AUTO: 48.1 % (ref 36–48)
HDLC SERPL-MCNC: 39 MG/DL (ref 40–60)
HDLC SERPL: 5.1 (ref 0–5)
HGB BLD-MCNC: 15.2 G/DL (ref 13–16)
IMM GRANULOCYTES # BLD AUTO: 0 K/UL (ref 0–0.04)
IMM GRANULOCYTES NFR BLD AUTO: 1 % (ref 0–0.5)
LDLC SERPL CALC-MCNC: 127.6 MG/DL (ref 0–100)
LIPID PANEL: ABNORMAL
LYMPHOCYTES # BLD: 1.7 K/UL (ref 0.9–3.6)
LYMPHOCYTES NFR BLD: 28 % (ref 21–52)
MCH RBC QN AUTO: 27.7 PG (ref 24–34)
MCHC RBC AUTO-ENTMCNC: 31.6 G/DL (ref 31–37)
MCV RBC AUTO: 87.8 FL (ref 78–100)
MONOCYTES # BLD: 0.7 K/UL (ref 0.05–1.2)
MONOCYTES NFR BLD: 11 % (ref 3–10)
NEUTS SEG # BLD: 3.4 K/UL (ref 1.8–8)
NEUTS SEG NFR BLD: 57 % (ref 40–73)
NRBC # BLD: 0 K/UL (ref 0–0.01)
NRBC BLD-RTO: 0 PER 100 WBC
PLATELET # BLD AUTO: 264 K/UL (ref 135–420)
PMV BLD AUTO: 10.7 FL (ref 9.2–11.8)
POTASSIUM SERPL-SCNC: 4.2 MMOL/L (ref 3.5–5.5)
PROT SERPL-MCNC: 8.2 G/DL (ref 6.4–8.2)
RBC # BLD AUTO: 5.48 M/UL (ref 4.35–5.65)
SODIUM SERPL-SCNC: 136 MMOL/L (ref 136–145)
TRIGL SERPL-MCNC: 152 MG/DL
VLDLC SERPL CALC-MCNC: 30.4 MG/DL
WBC # BLD AUTO: 6.1 K/UL (ref 4.6–13.2)

## 2023-10-02 PROCEDURE — 80061 LIPID PANEL: CPT

## 2023-10-02 PROCEDURE — 36415 COLL VENOUS BLD VENIPUNCTURE: CPT

## 2023-10-02 PROCEDURE — 80053 COMPREHEN METABOLIC PANEL: CPT

## 2023-10-02 PROCEDURE — 85025 COMPLETE CBC W/AUTO DIFF WBC: CPT

## 2023-10-06 ENCOUNTER — OFFICE VISIT (OUTPATIENT)
Facility: CLINIC | Age: 43
End: 2023-10-06
Payer: COMMERCIAL

## 2023-10-06 VITALS
HEIGHT: 73 IN | RESPIRATION RATE: 16 BRPM | WEIGHT: 315 LBS | TEMPERATURE: 97.1 F | HEART RATE: 85 BPM | OXYGEN SATURATION: 94 % | SYSTOLIC BLOOD PRESSURE: 135 MMHG | DIASTOLIC BLOOD PRESSURE: 68 MMHG | BODY MASS INDEX: 41.75 KG/M2

## 2023-10-06 DIAGNOSIS — Z91.199 NON COMPLIANCE WITH MEDICAL TREATMENT: ICD-10-CM

## 2023-10-06 DIAGNOSIS — E78.2 MIXED HYPERLIPIDEMIA: ICD-10-CM

## 2023-10-06 DIAGNOSIS — F10.10 ALCOHOL ABUSE, UNCOMPLICATED: ICD-10-CM

## 2023-10-06 DIAGNOSIS — M1A.09X0 CHRONIC GOUT OF MULTIPLE SITES, UNSPECIFIED CAUSE: ICD-10-CM

## 2023-10-06 DIAGNOSIS — G47.33 OSA (OBSTRUCTIVE SLEEP APNEA): ICD-10-CM

## 2023-10-06 DIAGNOSIS — N52.9 ERECTILE DYSFUNCTION, UNSPECIFIED ERECTILE DYSFUNCTION TYPE: ICD-10-CM

## 2023-10-06 DIAGNOSIS — I10 ESSENTIAL (PRIMARY) HYPERTENSION: Primary | ICD-10-CM

## 2023-10-06 DIAGNOSIS — Z12.11 SCREENING FOR COLON CANCER: ICD-10-CM

## 2023-10-06 PROCEDURE — 3075F SYST BP GE 130 - 139MM HG: CPT | Performed by: STUDENT IN AN ORGANIZED HEALTH CARE EDUCATION/TRAINING PROGRAM

## 2023-10-06 PROCEDURE — 3078F DIAST BP <80 MM HG: CPT | Performed by: STUDENT IN AN ORGANIZED HEALTH CARE EDUCATION/TRAINING PROGRAM

## 2023-10-06 PROCEDURE — 99214 OFFICE O/P EST MOD 30 MIN: CPT | Performed by: STUDENT IN AN ORGANIZED HEALTH CARE EDUCATION/TRAINING PROGRAM

## 2023-10-06 RX ORDER — SILDENAFIL 50 MG/1
TABLET, FILM COATED ORAL
Qty: 30 TABLET | Refills: 3 | Status: SHIPPED | OUTPATIENT
Start: 2023-10-06

## 2023-10-06 SDOH — ECONOMIC STABILITY: FOOD INSECURITY: WITHIN THE PAST 12 MONTHS, THE FOOD YOU BOUGHT JUST DIDN'T LAST AND YOU DIDN'T HAVE MONEY TO GET MORE.: NEVER TRUE

## 2023-10-06 SDOH — ECONOMIC STABILITY: FOOD INSECURITY: WITHIN THE PAST 12 MONTHS, YOU WORRIED THAT YOUR FOOD WOULD RUN OUT BEFORE YOU GOT MONEY TO BUY MORE.: NEVER TRUE

## 2023-10-06 SDOH — ECONOMIC STABILITY: INCOME INSECURITY: HOW HARD IS IT FOR YOU TO PAY FOR THE VERY BASICS LIKE FOOD, HOUSING, MEDICAL CARE, AND HEATING?: NOT HARD AT ALL

## 2023-10-06 ASSESSMENT — PATIENT HEALTH QUESTIONNAIRE - PHQ9
2. FEELING DOWN, DEPRESSED OR HOPELESS: 1
SUM OF ALL RESPONSES TO PHQ QUESTIONS 1-9: 1
1. LITTLE INTEREST OR PLEASURE IN DOING THINGS: 0
SUM OF ALL RESPONSES TO PHQ9 QUESTIONS 1 & 2: 1

## 2023-10-06 ASSESSMENT — ANXIETY QUESTIONNAIRES
GAD7 TOTAL SCORE: 0
2. NOT BEING ABLE TO STOP OR CONTROL WORRYING: 0
5. BEING SO RESTLESS THAT IT IS HARD TO SIT STILL: 0
4. TROUBLE RELAXING: 0
7. FEELING AFRAID AS IF SOMETHING AWFUL MIGHT HAPPEN: 0
3. WORRYING TOO MUCH ABOUT DIFFERENT THINGS: 0
1. FEELING NERVOUS, ANXIOUS, OR ON EDGE: 0
IF YOU CHECKED OFF ANY PROBLEMS ON THIS QUESTIONNAIRE, HOW DIFFICULT HAVE THESE PROBLEMS MADE IT FOR YOU TO DO YOUR WORK, TAKE CARE OF THINGS AT HOME, OR GET ALONG WITH OTHER PEOPLE: NOT DIFFICULT AT ALL
6. BECOMING EASILY ANNOYED OR IRRITABLE: 0

## 2023-10-06 ASSESSMENT — ENCOUNTER SYMPTOMS
NAUSEA: 0
CONSTIPATION: 0
SHORTNESS OF BREATH: 0
DIARRHEA: 0
RHINORRHEA: 0
VOMITING: 0
CHEST TIGHTNESS: 0
BACK PAIN: 0
ABDOMINAL PAIN: 0

## 2023-10-06 NOTE — PROGRESS NOTES
10/02/2023 14  12 - 20   Final    Est, Glom Filt Rate 10/02/2023 >60  >60 ml/min/1.73m2 Final    Comment:    Pediatric calculator link: Danielle.at. org/professionals/kdoqi/gfr_calculatorped     These results are not intended for use in patients <25years of age. eGFR results are calculated without a race factor using  the 2021 CKD-EPI equation. Careful clinical correlation is recommended, particularly when comparing to results calculated using previous equations. The CKD-EPI equation is less accurate in patients with extremes of muscle mass, extra-renal metabolism of creatinine, excessive creatine ingestion, or following therapy that affects renal tubular secretion.       Calcium 10/02/2023 8.9  8.5 - 10.1 MG/DL Final    Total Bilirubin 10/02/2023 0.7  0.2 - 1.0 MG/DL Final    ALT 10/02/2023 44  16 - 61 U/L Final    AST 10/02/2023 39 (H)  10 - 38 U/L Final    Alk Phosphatase 10/02/2023 62  45 - 117 U/L Final    Total Protein 10/02/2023 8.2  6.4 - 8.2 g/dL Final    Albumin 10/02/2023 3.8  3.4 - 5.0 g/dL Final    Globulin 10/02/2023 4.4 (H)  2.0 - 4.0 g/dL Final    Albumin/Globulin Ratio 10/02/2023 0.9  0.8 - 1.7   Final    WBC 10/02/2023 6.1  4.6 - 13.2 K/uL Final    RBC 10/02/2023 5.48  4.35 - 5.65 M/uL Final    Hemoglobin 10/02/2023 15.2  13.0 - 16.0 g/dL Final    Hematocrit 10/02/2023 48.1 (H)  36.0 - 48.0 % Final    MCV 10/02/2023 87.8  78.0 - 100.0 FL Final    MCH 10/02/2023 27.7  24.0 - 34.0 PG Final    MCHC 10/02/2023 31.6  31.0 - 37.0 g/dL Final    RDW 10/02/2023 14.1  11.6 - 14.5 % Final    Platelets 86/80/0165 264  135 - 420 K/uL Final    MPV 10/02/2023 10.7  9.2 - 11.8 FL Final    Nucleated RBCs 10/02/2023 0.0  0  WBC Final    nRBC 10/02/2023 0.00  0.00 - 0.01 K/uL Final    Neutrophils % 10/02/2023 57  40 - 73 % Final    Lymphocytes % 10/02/2023 28  21 - 52 % Final    Monocytes % 10/02/2023 11 (H)  3 - 10 % Final    Eosinophils % 10/02/2023 4  0 - 5 % Final    Basophils % 10/02/2023 1  0

## 2024-02-02 ENCOUNTER — OFFICE VISIT (OUTPATIENT)
Facility: CLINIC | Age: 44
End: 2024-02-02
Payer: COMMERCIAL

## 2024-02-02 VITALS
SYSTOLIC BLOOD PRESSURE: 172 MMHG | HEART RATE: 69 BPM | BODY MASS INDEX: 41.75 KG/M2 | RESPIRATION RATE: 16 BRPM | HEIGHT: 73 IN | WEIGHT: 315 LBS | TEMPERATURE: 97.7 F | DIASTOLIC BLOOD PRESSURE: 108 MMHG | OXYGEN SATURATION: 97 %

## 2024-02-02 DIAGNOSIS — R73.03 PREDIABETES: ICD-10-CM

## 2024-02-02 DIAGNOSIS — I10 ESSENTIAL (PRIMARY) HYPERTENSION: Primary | ICD-10-CM

## 2024-02-02 DIAGNOSIS — E66.2 OBESITY HYPOVENTILATION SYNDROME (HCC): ICD-10-CM

## 2024-02-02 DIAGNOSIS — E78.2 MIXED HYPERLIPIDEMIA: ICD-10-CM

## 2024-02-02 DIAGNOSIS — Z11.59 NEED FOR HEPATITIS B SCREENING TEST: ICD-10-CM

## 2024-02-02 DIAGNOSIS — F10.10 ALCOHOL ABUSE, UNCOMPLICATED: ICD-10-CM

## 2024-02-02 DIAGNOSIS — E66.01 MORBID (SEVERE) OBESITY DUE TO EXCESS CALORIES (HCC): ICD-10-CM

## 2024-02-02 DIAGNOSIS — N52.9 ERECTILE DYSFUNCTION, UNSPECIFIED ERECTILE DYSFUNCTION TYPE: ICD-10-CM

## 2024-02-02 DIAGNOSIS — M1A.09X0 CHRONIC GOUT OF MULTIPLE SITES, UNSPECIFIED CAUSE: ICD-10-CM

## 2024-02-02 DIAGNOSIS — G47.33 OSA (OBSTRUCTIVE SLEEP APNEA): ICD-10-CM

## 2024-02-02 PROCEDURE — 99214 OFFICE O/P EST MOD 30 MIN: CPT | Performed by: STUDENT IN AN ORGANIZED HEALTH CARE EDUCATION/TRAINING PROGRAM

## 2024-02-02 PROCEDURE — 3080F DIAST BP >= 90 MM HG: CPT | Performed by: STUDENT IN AN ORGANIZED HEALTH CARE EDUCATION/TRAINING PROGRAM

## 2024-02-02 PROCEDURE — 3077F SYST BP >= 140 MM HG: CPT | Performed by: STUDENT IN AN ORGANIZED HEALTH CARE EDUCATION/TRAINING PROGRAM

## 2024-02-02 RX ORDER — SILDENAFIL 100 MG/1
TABLET, FILM COATED ORAL
Qty: 30 TABLET | Refills: 2 | Status: SHIPPED | OUTPATIENT
Start: 2024-02-02

## 2024-02-02 SDOH — ECONOMIC STABILITY: FOOD INSECURITY: WITHIN THE PAST 12 MONTHS, THE FOOD YOU BOUGHT JUST DIDN'T LAST AND YOU DIDN'T HAVE MONEY TO GET MORE.: NEVER TRUE

## 2024-02-02 SDOH — ECONOMIC STABILITY: INCOME INSECURITY: HOW HARD IS IT FOR YOU TO PAY FOR THE VERY BASICS LIKE FOOD, HOUSING, MEDICAL CARE, AND HEATING?: NOT HARD AT ALL

## 2024-02-02 SDOH — ECONOMIC STABILITY: FOOD INSECURITY: WITHIN THE PAST 12 MONTHS, YOU WORRIED THAT YOUR FOOD WOULD RUN OUT BEFORE YOU GOT MONEY TO BUY MORE.: NEVER TRUE

## 2024-02-02 ASSESSMENT — ENCOUNTER SYMPTOMS
BACK PAIN: 0
ABDOMINAL PAIN: 0
SHORTNESS OF BREATH: 0
DIARRHEA: 0
CHEST TIGHTNESS: 0
RHINORRHEA: 0
VOMITING: 0
CONSTIPATION: 0
NAUSEA: 0

## 2024-02-02 ASSESSMENT — ANXIETY QUESTIONNAIRES
3. WORRYING TOO MUCH ABOUT DIFFERENT THINGS: 0
2. NOT BEING ABLE TO STOP OR CONTROL WORRYING: 0
5. BEING SO RESTLESS THAT IT IS HARD TO SIT STILL: 0
IF YOU CHECKED OFF ANY PROBLEMS ON THIS QUESTIONNAIRE, HOW DIFFICULT HAVE THESE PROBLEMS MADE IT FOR YOU TO DO YOUR WORK, TAKE CARE OF THINGS AT HOME, OR GET ALONG WITH OTHER PEOPLE: NOT DIFFICULT AT ALL
1. FEELING NERVOUS, ANXIOUS, OR ON EDGE: 0
4. TROUBLE RELAXING: 0
6. BECOMING EASILY ANNOYED OR IRRITABLE: 0
7. FEELING AFRAID AS IF SOMETHING AWFUL MIGHT HAPPEN: 0
GAD7 TOTAL SCORE: 0

## 2024-02-02 NOTE — PROGRESS NOTES
\"Have you been to the ER, urgent care clinic since your last visit?  Hospitalized since your last visit?\"    NO    “Have you seen or consulted any other health care providers outside of [facility] since your last visit?”    NO    “Have you had a colorectal cancer screening such as a colonoscopy/FIT/Cologuard?    NO        
  Alcohol abuse: Patient reports he has not had a drink this month.  He was noted to have elevated LFTs-check repeat    Obesity: Counseled on lifestyle modification.  Patient has lost 5 pounds since last visit.  Advised to start working on his diet, cut down on sugars and start exercising-goal is to go for a walk for at least 30 minutes 3 times a week initially.    Gout: Continue allopurinol.      CAMELIA: Emphasized that patient needs to use CPAP daily.  Refer to sleep medicine    Labs ordered.    Referred to GI for colon cancer screening.      Return in about 6 months (around 8/2/2024).     I asked the patient if he  had any questions and answered his  questions.  The patient stated that he understands the treatment plan and agrees with the treatment plan    This document was created with a voice activated dictation system and may contain transcription errors.

## 2024-02-20 DIAGNOSIS — E78.2 MIXED HYPERLIPIDEMIA: ICD-10-CM

## 2024-02-20 RX ORDER — ATORVASTATIN CALCIUM 20 MG/1
20 TABLET, FILM COATED ORAL DAILY
Qty: 90 TABLET | Refills: 1 | Status: SHIPPED | OUTPATIENT
Start: 2024-02-20

## 2024-05-02 ENCOUNTER — APPOINTMENT (OUTPATIENT)
Facility: HOSPITAL | Age: 44
End: 2024-05-02
Payer: COMMERCIAL

## 2024-05-02 ENCOUNTER — HOSPITAL ENCOUNTER (EMERGENCY)
Facility: HOSPITAL | Age: 44
Discharge: HOME | End: 2024-05-02
Attending: EMERGENCY MEDICINE
Payer: COMMERCIAL

## 2024-05-02 VITALS
SYSTOLIC BLOOD PRESSURE: 157 MMHG | OXYGEN SATURATION: 96 % | WEIGHT: 315 LBS | RESPIRATION RATE: 20 BRPM | DIASTOLIC BLOOD PRESSURE: 92 MMHG | TEMPERATURE: 98.2 F | HEIGHT: 73 IN | BODY MASS INDEX: 41.75 KG/M2 | HEART RATE: 89 BPM

## 2024-05-02 DIAGNOSIS — H20.9 TRAUMATIC IRITIS: Primary | ICD-10-CM

## 2024-05-02 PROCEDURE — 70486 CT MAXILLOFACIAL W/O DYE: CPT

## 2024-05-02 PROCEDURE — 99284 EMERGENCY DEPT VISIT MOD MDM: CPT

## 2024-05-02 PROCEDURE — 6370000000 HC RX 637 (ALT 250 FOR IP)

## 2024-05-02 RX ORDER — CYCLOPENTOLATE HYDROCHLORIDE 20 MG/ML
1 SOLUTION/ DROPS OPHTHALMIC ONCE
Status: COMPLETED | OUTPATIENT
Start: 2024-05-02 | End: 2024-05-02

## 2024-05-02 RX ORDER — CYCLOPENTOLATE HYDROCHLORIDE 20 MG/ML
1 SOLUTION/ DROPS OPHTHALMIC ONCE
Status: DISCONTINUED | OUTPATIENT
Start: 2024-05-02 | End: 2024-05-02

## 2024-05-02 RX ORDER — CYCLOPENTOLATE HYDROCHLORIDE 10 MG/ML
1 SOLUTION/ DROPS OPHTHALMIC 3 TIMES DAILY
Qty: 0.05 ML | Refills: 0 | Status: SHIPPED | OUTPATIENT
Start: 2024-05-02

## 2024-05-02 RX ADMIN — CYCLOPENTOLATE HYDROCHLORIDE 1 DROP: 20 SOLUTION/ DROPS OPHTHALMIC at 22:18

## 2024-05-02 ASSESSMENT — PAIN DESCRIPTION - LOCATION: LOCATION: EYE

## 2024-05-02 ASSESSMENT — VISUAL ACUITY
OD: 20/13
OU: 20/20
OS: 20/20

## 2024-05-02 ASSESSMENT — PAIN - FUNCTIONAL ASSESSMENT: PAIN_FUNCTIONAL_ASSESSMENT: 0-10

## 2024-05-02 ASSESSMENT — PAIN DESCRIPTION - ORIENTATION: ORIENTATION: LEFT
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
## 2024-05-10 ENCOUNTER — TELEPHONE (OUTPATIENT)
Facility: CLINIC | Age: 44
End: 2024-05-10

## 2024-05-10 DIAGNOSIS — N52.9 ERECTILE DYSFUNCTION, UNSPECIFIED ERECTILE DYSFUNCTION TYPE: ICD-10-CM

## 2024-05-10 RX ORDER — SILDENAFIL 100 MG/1
TABLET, FILM COATED ORAL
Qty: 30 TABLET | Refills: 2 | Status: SHIPPED | OUTPATIENT
Start: 2024-05-10

## 2024-05-10 NOTE — TELEPHONE ENCOUNTER
Patient called in for a refill for:    sildenafil (VIAGRA) 100 MG tablet    Pharmacy info:  [facility] [id number] - [place], [place] - [facility] - P [phone] - F [phone]

## 2024-07-22 ENCOUNTER — HOSPITAL ENCOUNTER (OUTPATIENT)
Facility: HOSPITAL | Age: 44
Setting detail: SPECIMEN
Discharge: HOME | End: 2024-07-25
Payer: COMMERCIAL

## 2024-07-22 DIAGNOSIS — Z11.59 NEED FOR HEPATITIS B SCREENING TEST: ICD-10-CM

## 2024-07-22 DIAGNOSIS — R73.03 PREDIABETES: ICD-10-CM

## 2024-07-22 DIAGNOSIS — E78.2 MIXED HYPERLIPIDEMIA: ICD-10-CM

## 2024-07-22 DIAGNOSIS — I10 ESSENTIAL (PRIMARY) HYPERTENSION: ICD-10-CM

## 2024-07-22 DIAGNOSIS — M1A.09X0 CHRONIC GOUT OF MULTIPLE SITES, UNSPECIFIED CAUSE: ICD-10-CM

## 2024-07-22 LAB
ALBUMIN SERPL-MCNC: 3.9 G/DL (ref 3.4–5)
ALBUMIN/GLOB SERPL: 0.9 (ref 0.8–1.7)
ALP SERPL-CCNC: 70 U/L (ref 45–117)
ALT SERPL-CCNC: 41 U/L (ref 16–61)
ANION GAP SERPL CALC-SCNC: 7 MMOL/L (ref 3–18)
AST SERPL-CCNC: 34 U/L (ref 10–38)
BASOPHILS # BLD: 0 K/UL (ref 0–0.1)
BASOPHILS NFR BLD: 0 % (ref 0–2)
BILIRUB SERPL-MCNC: 0.8 MG/DL (ref 0.2–1)
BUN SERPL-MCNC: 12 MG/DL (ref 7–18)
BUN/CREAT SERPL: 10 (ref 12–20)
CALCIUM SERPL-MCNC: 9.3 MG/DL (ref 8.5–10.1)
CHLORIDE SERPL-SCNC: 101 MMOL/L (ref 100–111)
CHOLEST SERPL-MCNC: 189 MG/DL
CO2 SERPL-SCNC: 30 MMOL/L (ref 21–32)
CREAT SERPL-MCNC: 1.16 MG/DL (ref 0.6–1.3)
DIFFERENTIAL METHOD BLD: ABNORMAL
EOSINOPHIL # BLD: 0.3 K/UL (ref 0–0.4)
EOSINOPHIL NFR BLD: 4 % (ref 0–5)
ERYTHROCYTE [DISTWIDTH] IN BLOOD BY AUTOMATED COUNT: 14.4 % (ref 11.6–14.5)
EST. AVERAGE GLUCOSE BLD GHB EST-MCNC: 111 MG/DL
GLOBULIN SER CALC-MCNC: 4.3 G/DL (ref 2–4)
GLUCOSE SERPL-MCNC: 114 MG/DL (ref 74–99)
HBA1C MFR BLD: 5.5 % (ref 4.2–5.6)
HCT VFR BLD AUTO: 50.7 % (ref 36–48)
HDLC SERPL-MCNC: 40 MG/DL (ref 40–60)
HDLC SERPL: 4.7 (ref 0–5)
HGB BLD-MCNC: 16.1 G/DL (ref 13–16)
IMM GRANULOCYTES # BLD AUTO: 0 K/UL (ref 0–0.04)
IMM GRANULOCYTES NFR BLD AUTO: 0 % (ref 0–0.5)
LDLC SERPL CALC-MCNC: 115.8 MG/DL (ref 0–100)
LIPID PANEL: ABNORMAL
LYMPHOCYTES # BLD: 1.7 K/UL (ref 0.9–3.6)
LYMPHOCYTES NFR BLD: 27 % (ref 21–52)
MCH RBC QN AUTO: 28.2 PG (ref 24–34)
MCHC RBC AUTO-ENTMCNC: 31.8 G/DL (ref 31–37)
MCV RBC AUTO: 88.8 FL (ref 78–100)
MONOCYTES # BLD: 0.8 K/UL (ref 0.05–1.2)
MONOCYTES NFR BLD: 12 % (ref 3–10)
NEUTS SEG # BLD: 3.4 K/UL (ref 1.8–8)
NEUTS SEG NFR BLD: 56 % (ref 40–73)
NRBC # BLD: 0 K/UL (ref 0–0.01)
NRBC BLD-RTO: 0 PER 100 WBC
PLATELET # BLD AUTO: 291 K/UL (ref 135–420)
PMV BLD AUTO: 10.7 FL (ref 9.2–11.8)
POTASSIUM SERPL-SCNC: 4.3 MMOL/L (ref 3.5–5.5)
PROT SERPL-MCNC: 8.2 G/DL (ref 6.4–8.2)
RBC # BLD AUTO: 5.71 M/UL (ref 4.35–5.65)
SODIUM SERPL-SCNC: 138 MMOL/L (ref 136–145)
TRIGL SERPL-MCNC: 166 MG/DL
URATE SERPL-MCNC: 7.7 MG/DL (ref 2.6–7.2)
VLDLC SERPL CALC-MCNC: 33.2 MG/DL
WBC # BLD AUTO: 6.1 K/UL (ref 4.6–13.2)

## 2024-07-22 PROCEDURE — 85025 COMPLETE CBC W/AUTO DIFF WBC: CPT

## 2024-07-22 PROCEDURE — 80053 COMPREHEN METABOLIC PANEL: CPT

## 2024-07-22 PROCEDURE — 86706 HEP B SURFACE ANTIBODY: CPT

## 2024-07-22 PROCEDURE — 84550 ASSAY OF BLOOD/URIC ACID: CPT

## 2024-07-22 PROCEDURE — 80061 LIPID PANEL: CPT

## 2024-07-22 PROCEDURE — 83036 HEMOGLOBIN GLYCOSYLATED A1C: CPT

## 2024-07-22 PROCEDURE — 36415 COLL VENOUS BLD VENIPUNCTURE: CPT

## 2024-07-22 PROCEDURE — 87340 HEPATITIS B SURFACE AG IA: CPT

## 2024-07-23 LAB
HBV SURFACE AB SER QL IA: POSITIVE
HBV SURFACE AB SERPL IA-ACNC: 11.4 MIU/ML
HBV SURFACE AG SER QL: <0.1 INDEX
HBV SURFACE AG SER QL: NEGATIVE
HEP BS AB COMMENT: NORMAL

## 2024-08-02 ENCOUNTER — OFFICE VISIT (OUTPATIENT)
Facility: CLINIC | Age: 44
End: 2024-08-02

## 2024-08-02 VITALS
RESPIRATION RATE: 16 BRPM | DIASTOLIC BLOOD PRESSURE: 85 MMHG | HEART RATE: 76 BPM | OXYGEN SATURATION: 96 % | BODY MASS INDEX: 41.75 KG/M2 | SYSTOLIC BLOOD PRESSURE: 131 MMHG | WEIGHT: 315 LBS | TEMPERATURE: 99.6 F | HEIGHT: 73 IN

## 2024-08-02 DIAGNOSIS — I10 ESSENTIAL (PRIMARY) HYPERTENSION: Primary | ICD-10-CM

## 2024-08-02 DIAGNOSIS — E66.2 OBESITY HYPOVENTILATION SYNDROME (HCC): ICD-10-CM

## 2024-08-02 DIAGNOSIS — G47.33 OSA (OBSTRUCTIVE SLEEP APNEA): ICD-10-CM

## 2024-08-02 DIAGNOSIS — F10.10 ALCOHOL ABUSE, UNCOMPLICATED: ICD-10-CM

## 2024-08-02 DIAGNOSIS — R73.03 PREDIABETES: ICD-10-CM

## 2024-08-02 DIAGNOSIS — E78.2 MIXED HYPERLIPIDEMIA: ICD-10-CM

## 2024-08-02 DIAGNOSIS — M1A.09X0 CHRONIC GOUT OF MULTIPLE SITES, UNSPECIFIED CAUSE: ICD-10-CM

## 2024-08-02 RX ORDER — LOSARTAN POTASSIUM AND HYDROCHLOROTHIAZIDE 25; 100 MG/1; MG/1
1 TABLET ORAL DAILY
Qty: 90 TABLET | Refills: 1 | Status: SHIPPED | OUTPATIENT
Start: 2024-08-02

## 2024-08-02 RX ORDER — ALLOPURINOL 300 MG/1
300 TABLET ORAL DAILY
Qty: 90 TABLET | Refills: 1 | Status: SHIPPED | OUTPATIENT
Start: 2024-08-02

## 2024-08-02 RX ORDER — ATORVASTATIN CALCIUM 20 MG/1
20 TABLET, FILM COATED ORAL DAILY
Qty: 90 TABLET | Refills: 1 | Status: SHIPPED | OUTPATIENT
Start: 2024-08-02

## 2024-08-02 RX ORDER — AMLODIPINE BESYLATE 10 MG/1
10 TABLET ORAL DAILY
Qty: 90 TABLET | Refills: 1 | Status: SHIPPED | OUTPATIENT
Start: 2024-08-02

## 2024-08-02 SDOH — ECONOMIC STABILITY: FOOD INSECURITY: WITHIN THE PAST 12 MONTHS, YOU WORRIED THAT YOUR FOOD WOULD RUN OUT BEFORE YOU GOT MONEY TO BUY MORE.: NEVER TRUE

## 2024-08-02 SDOH — ECONOMIC STABILITY: FOOD INSECURITY: WITHIN THE PAST 12 MONTHS, THE FOOD YOU BOUGHT JUST DIDN'T LAST AND YOU DIDN'T HAVE MONEY TO GET MORE.: NEVER TRUE

## 2024-08-02 SDOH — ECONOMIC STABILITY: INCOME INSECURITY: HOW HARD IS IT FOR YOU TO PAY FOR THE VERY BASICS LIKE FOOD, HOUSING, MEDICAL CARE, AND HEATING?: NOT HARD AT ALL

## 2024-08-02 ASSESSMENT — ANXIETY QUESTIONNAIRES
6. BECOMING EASILY ANNOYED OR IRRITABLE: NOT AT ALL
GAD7 TOTAL SCORE: 0
4. TROUBLE RELAXING: NOT AT ALL
7. FEELING AFRAID AS IF SOMETHING AWFUL MIGHT HAPPEN: NOT AT ALL
5. BEING SO RESTLESS THAT IT IS HARD TO SIT STILL: NOT AT ALL
3. WORRYING TOO MUCH ABOUT DIFFERENT THINGS: NOT AT ALL
1. FEELING NERVOUS, ANXIOUS, OR ON EDGE: NOT AT ALL
2. NOT BEING ABLE TO STOP OR CONTROL WORRYING: NOT AT ALL
IF YOU CHECKED OFF ANY PROBLEMS ON THIS QUESTIONNAIRE, HOW DIFFICULT HAVE THESE PROBLEMS MADE IT FOR YOU TO DO YOUR WORK, TAKE CARE OF THINGS AT HOME, OR GET ALONG WITH OTHER PEOPLE: NOT DIFFICULT AT ALL

## 2024-08-02 ASSESSMENT — ENCOUNTER SYMPTOMS
RHINORRHEA: 0
SHORTNESS OF BREATH: 0
NAUSEA: 0
ABDOMINAL PAIN: 0
VOMITING: 0
CHEST TIGHTNESS: 0
CONSTIPATION: 0
BACK PAIN: 0
DIARRHEA: 0

## 2024-08-02 ASSESSMENT — PATIENT HEALTH QUESTIONNAIRE - PHQ9
1. LITTLE INTEREST OR PLEASURE IN DOING THINGS: NOT AT ALL
SUM OF ALL RESPONSES TO PHQ QUESTIONS 1-9: 0
2. FEELING DOWN, DEPRESSED OR HOPELESS: NOT AT ALL
SUM OF ALL RESPONSES TO PHQ9 QUESTIONS 1 & 2: 0

## 2024-08-02 NOTE — PROGRESS NOTES
\"Have you been to the ER, urgent care clinic since your last visit?  Hospitalized since your last visit?\"    NO    “Have you seen or consulted any other health care providers outside of [facility] since your last visit?”    NO        “Have you had a colorectal cancer screening such as a colonoscopy/FIT/Cologuard?    NO    No colonoscopy on file  No cologuard on file  No FIT/FOBT on file   No flexible sigmoidoscopy on file         Click Here for Release of Records Request   
07/22/2024 0  0 - 2 % Final    Immature Granulocytes % 07/22/2024 0  0.0 - 0.5 % Final    Neutrophils Absolute 07/22/2024 3.4  1.8 - 8.0 K/UL Final    Lymphocytes Absolute 07/22/2024 1.7  0.9 - 3.6 K/UL Final    Monocytes Absolute 07/22/2024 0.8  0.05 - 1.2 K/UL Final    Eosinophils Absolute 07/22/2024 0.3  0.0 - 0.4 K/UL Final    Basophils Absolute 07/22/2024 0.0  0.0 - 0.1 K/UL Final    Immature Granulocytes Absolute 07/22/2024 0.0  0.00 - 0.04 K/UL Final    Differential Type 07/22/2024 AUTOMATED    Final    Sodium 07/22/2024 138  136 - 145 mmol/L Final    Potassium 07/22/2024 4.3  3.5 - 5.5 mmol/L Final    Chloride 07/22/2024 101  100 - 111 mmol/L Final    CO2 07/22/2024 30  21 - 32 mmol/L Final    Anion Gap 07/22/2024 7  3.0 - 18 mmol/L Final    Glucose 07/22/2024 114 (H)  74 - 99 mg/dL Final    BUN 07/22/2024 12  7.0 - 18 MG/DL Final    Creatinine 07/22/2024 1.16  0.6 - 1.3 MG/DL Final    BUN/Creatinine Ratio 07/22/2024 10 (L)  12 - 20   Final    Est, Glom Filt Rate 07/22/2024 79  >60 ml/min/1.73m2 Final    Comment:    Pediatric calculator link: https://www.kidney.org/professionals/kdoqi/gfr_calculatorped     These results are not intended for use in patients <18 years of age.     eGFR results are calculated without a race factor using  the 2021 CKD-EPI equation. Careful clinical correlation is recommended, particularly when comparing to results calculated using previous equations.  The CKD-EPI equation is less accurate in patients with extremes of muscle mass, extra-renal metabolism of creatinine, excessive creatine ingestion, or following therapy that affects renal tubular secretion.      Calcium 07/22/2024 9.3  8.5 - 10.1 MG/DL Final    Total Bilirubin 07/22/2024 0.8  0.2 - 1.0 MG/DL Final    ALT 07/22/2024 41  16 - 61 U/L Final    AST 07/22/2024 34  10 - 38 U/L Final    Alk Phosphatase 07/22/2024 70  45 - 117 U/L Final    Total Protein 07/22/2024 8.2  6.4 - 8.2 g/dL Final    Albumin 07/22/2024 3.9  3.4 -

## 2024-10-08 ENCOUNTER — TELEPHONE (OUTPATIENT)
Facility: CLINIC | Age: 44
End: 2024-10-08

## 2024-10-08 DIAGNOSIS — N52.9 ERECTILE DYSFUNCTION, UNSPECIFIED ERECTILE DYSFUNCTION TYPE: ICD-10-CM

## 2024-10-08 RX ORDER — SILDENAFIL 100 MG/1
TABLET, FILM COATED ORAL
Qty: 30 TABLET | Refills: 2 | Status: SHIPPED | OUTPATIENT
Start: 2024-10-08

## 2024-10-08 NOTE — TELEPHONE ENCOUNTER
Patient needs a refill of the following- patient says he is still taking        sildenafil (VIAGRA) 100 MG tablet    Pharmacy: [facility] [id number] - [place], [place] - [facility] [address] - [address] [phone] - F [phone]

## 2025-01-06 ENCOUNTER — TELEPHONE (OUTPATIENT)
Facility: CLINIC | Age: 45
End: 2025-01-06

## 2025-01-06 DIAGNOSIS — I10 ESSENTIAL (PRIMARY) HYPERTENSION: ICD-10-CM

## 2025-01-06 DIAGNOSIS — N52.9 ERECTILE DYSFUNCTION, UNSPECIFIED ERECTILE DYSFUNCTION TYPE: ICD-10-CM

## 2025-01-06 DIAGNOSIS — E78.2 MIXED HYPERLIPIDEMIA: ICD-10-CM

## 2025-01-06 NOTE — TELEPHONE ENCOUNTER
Pt requesting refills    Pt will call back because he did not have the names of all the medications he needs

## 2025-01-06 NOTE — TELEPHONE ENCOUNTER
Pt request refill on medication     atorvastatin 20mg  amlodipine   Losartan-hydro  Sildenafil    [facility]

## 2025-01-07 RX ORDER — AMLODIPINE BESYLATE 10 MG/1
10 TABLET ORAL DAILY
Qty: 90 TABLET | Refills: 1 | Status: SHIPPED | OUTPATIENT
Start: 2025-01-07

## 2025-01-07 RX ORDER — LOSARTAN POTASSIUM AND HYDROCHLOROTHIAZIDE 25; 100 MG/1; MG/1
1 TABLET ORAL DAILY
Qty: 90 TABLET | Refills: 1 | Status: SHIPPED | OUTPATIENT
Start: 2025-01-07

## 2025-01-07 RX ORDER — ATORVASTATIN CALCIUM 20 MG/1
20 TABLET, FILM COATED ORAL DAILY
Qty: 90 TABLET | Refills: 1 | Status: SHIPPED | OUTPATIENT
Start: 2025-01-07

## 2025-01-07 RX ORDER — SILDENAFIL 100 MG/1
TABLET, FILM COATED ORAL
Qty: 30 TABLET | Refills: 2 | Status: SHIPPED | OUTPATIENT
Start: 2025-01-07

## 2025-01-08 ENCOUNTER — TELEPHONE (OUTPATIENT)
Facility: CLINIC | Age: 45
End: 2025-01-08

## 2025-02-05 ENCOUNTER — OFFICE VISIT (OUTPATIENT)
Facility: CLINIC | Age: 45
End: 2025-02-05

## 2025-02-05 VITALS
HEART RATE: 71 BPM | HEIGHT: 73 IN | OXYGEN SATURATION: 95 % | DIASTOLIC BLOOD PRESSURE: 84 MMHG | BODY MASS INDEX: 41.75 KG/M2 | WEIGHT: 315 LBS | SYSTOLIC BLOOD PRESSURE: 143 MMHG

## 2025-02-05 DIAGNOSIS — Z12.11 SCREENING FOR COLON CANCER: ICD-10-CM

## 2025-02-05 DIAGNOSIS — R73.03 PREDIABETES: ICD-10-CM

## 2025-02-05 DIAGNOSIS — E66.2 OBESITY HYPOVENTILATION SYNDROME: ICD-10-CM

## 2025-02-05 DIAGNOSIS — I10 ESSENTIAL (PRIMARY) HYPERTENSION: Primary | ICD-10-CM

## 2025-02-05 DIAGNOSIS — G47.33 OSA (OBSTRUCTIVE SLEEP APNEA): ICD-10-CM

## 2025-02-05 DIAGNOSIS — M1A.09X0 CHRONIC GOUT OF MULTIPLE SITES, UNSPECIFIED CAUSE: ICD-10-CM

## 2025-02-05 DIAGNOSIS — E78.2 MIXED HYPERLIPIDEMIA: ICD-10-CM

## 2025-02-05 RX ORDER — ALLOPURINOL 300 MG/1
300 TABLET ORAL DAILY
Qty: 90 TABLET | Refills: 1 | Status: SHIPPED | OUTPATIENT
Start: 2025-02-05

## 2025-02-05 RX ORDER — INDOMETHACIN 50 MG/1
CAPSULE ORAL
Qty: 30 CAPSULE | Refills: 0 | Status: SHIPPED | OUTPATIENT
Start: 2025-02-05

## 2025-02-05 SDOH — ECONOMIC STABILITY: FOOD INSECURITY: WITHIN THE PAST 12 MONTHS, THE FOOD YOU BOUGHT JUST DIDN'T LAST AND YOU DIDN'T HAVE MONEY TO GET MORE.: NEVER TRUE

## 2025-02-05 SDOH — ECONOMIC STABILITY: FOOD INSECURITY: WITHIN THE PAST 12 MONTHS, YOU WORRIED THAT YOUR FOOD WOULD RUN OUT BEFORE YOU GOT MONEY TO BUY MORE.: NEVER TRUE

## 2025-02-05 ASSESSMENT — ENCOUNTER SYMPTOMS
BACK PAIN: 0
RHINORRHEA: 0
VOMITING: 0
CHEST TIGHTNESS: 0
SHORTNESS OF BREATH: 0
ABDOMINAL PAIN: 0
DIARRHEA: 0
NAUSEA: 0
CONSTIPATION: 0

## 2025-02-05 ASSESSMENT — PATIENT HEALTH QUESTIONNAIRE - PHQ9
SUM OF ALL RESPONSES TO PHQ QUESTIONS 1-9: 0
1. LITTLE INTEREST OR PLEASURE IN DOING THINGS: NOT AT ALL
2. FEELING DOWN, DEPRESSED OR HOPELESS: NOT AT ALL
SUM OF ALL RESPONSES TO PHQ QUESTIONS 1-9: 0
SUM OF ALL RESPONSES TO PHQ9 QUESTIONS 1 & 2: 0
SUM OF ALL RESPONSES TO PHQ QUESTIONS 1-9: 0
SUM OF ALL RESPONSES TO PHQ QUESTIONS 1-9: 0

## 2025-02-05 NOTE — PROGRESS NOTES
Kahlil Zhou is a 44 y.o. year old male who presents today for   Chief Complaint   Patient presents with    Follow-up     PT presents for follow up visit.        Is someone accompanying this pt? NO    Is the patient using any DME equipment during OV? NO    Depression Screenin/5/2025     9:28 AM 2024     8:58 AM 2024     9:15 AM 10/6/2023    10:11 AM 2023     7:20 AM 2023     6:54 AM 2022     6:45 AM   PHQ-9 Questionaire   Little interest or pleasure in doing things 0 0 0 0 0 0 1   Feeling down, depressed, or hopeless 0 0 0 1 0 0 1   PHQ-9 Total Score 0 0 0 1 0 0 2       Abuse Screening:       No data to display                Learning Assessment:  No question data found.    Fall Risk:       No data to display                    Coordination of Care:   1. \"Have you been to the ER, urgent care clinic since your last visit?  Hospitalized since your last visit?\" YES     2. \"Have you seen or consulted any other health care providers outside of the [facility] since your last visit?\" NO    3. For patients aged 45-75: Has the patient had a colonoscopy / FIT/ Cologuard? NO    If the patient is female:    4. For patients aged 40-74: Has the patient had a mammogram within the past 2 years? N/A    5. For patients aged 21-65: Has the patient had a pap smear? N/A    Health Maintenance: reviewed and discussed and ordered per Provider.    Health Maintenance Due   Topic Date Due    HIV screen  Never done    Colorectal Cancer Screen  Never done    Flu vaccine (1) Never done    COVID-19 Vaccine (2023- season) Never done        - HESHAM JEAN   [facility]        Click Here for Release of Records Request  
Provider      Assessment / Plan:     Diagnosis Orders   1. Essential (primary) hypertension  CBC with Auto Differential    Comprehensive Metabolic Panel      2. Mixed hyperlipidemia  Lipid Panel      3. CAMELIA (obstructive sleep apnea)  [facility] - Richard Barnes MD Sleep Medicine ([facility])      4. Prediabetes  Hemoglobin A1C      5. Chronic gout of multiple sites, unspecified cause  Uric Acid    allopurinol (ZYLOPRIM) 300 MG tablet    indomethacin (INDOCIN) 50 MG capsule      6. Obesity hypoventilation syndrome        7. Screening for colon cancer  Cologuard (Fecal DNA Colorectal Cancer Screening)          Counseled on medication and dietary compliance.    CAMELIA: Patient reports daytime drowsiness, sluggishness.  He is not using CPAP consistently-states that the mask does not fit properly.  Refer to sleep medicine    HTN: Continue amlodipine, losartan-hctz.     HLD: LDL elevated.  Continue Lipitor.  Check repeat lipid panel    Prediabetes: Check HbA1c     Alcohol abuse: Patient reports he has not had a drink in 2 weeks.    Obesity: Counseled on lifestyle modification and dietary changes.     Gout: Counseled to take allopurinol daily.  Check uric acid.  Will prescribe indomethacin as needed for acute attack    Patient does not want to proceed with colonoscopy.  Cologrosalind ordered      Return in about 4 months (around 6/5/2025).     I asked the patient if he  had any questions and answered his  questions.  The patient stated that he understands the treatment plan and agrees with the treatment plan    This document was created with a voice activated dictation system and may contain transcription errors.

## 2025-02-12 ENCOUNTER — NURSE ONLY (OUTPATIENT)
Facility: CLINIC | Age: 45
End: 2025-02-12

## 2025-02-12 VITALS — SYSTOLIC BLOOD PRESSURE: 138 MMHG | DIASTOLIC BLOOD PRESSURE: 86 MMHG

## 2025-02-28 LAB — NONINV COLON CA DNA+OCC BLD SCRN STL QL: NEGATIVE

## 2025-03-31 ENCOUNTER — OFFICE VISIT (OUTPATIENT)
Age: 45
End: 2025-03-31
Payer: COMMERCIAL

## 2025-03-31 VITALS
RESPIRATION RATE: 18 BRPM | BODY MASS INDEX: 42.66 KG/M2 | WEIGHT: 315 LBS | HEIGHT: 72 IN | SYSTOLIC BLOOD PRESSURE: 140 MMHG | DIASTOLIC BLOOD PRESSURE: 84 MMHG | TEMPERATURE: 98 F | HEART RATE: 90 BPM | OXYGEN SATURATION: 98 %

## 2025-03-31 DIAGNOSIS — Z99.89 CPAP (CONTINUOUS POSITIVE AIRWAY PRESSURE) DEPENDENCE: ICD-10-CM

## 2025-03-31 DIAGNOSIS — G47.33 OSA (OBSTRUCTIVE SLEEP APNEA): Primary | ICD-10-CM

## 2025-03-31 DIAGNOSIS — I15.9 SECONDARY HYPERTENSION: ICD-10-CM

## 2025-03-31 PROCEDURE — 3079F DIAST BP 80-89 MM HG: CPT | Performed by: INTERNAL MEDICINE

## 2025-03-31 PROCEDURE — 3077F SYST BP >= 140 MM HG: CPT | Performed by: INTERNAL MEDICINE

## 2025-03-31 PROCEDURE — 99204 OFFICE O/P NEW MOD 45 MIN: CPT | Performed by: INTERNAL MEDICINE

## 2025-03-31 ASSESSMENT — SLEEP AND FATIGUE QUESTIONNAIRES
HOW LIKELY ARE YOU TO NOD OFF OR FALL ASLEEP WHEN YOU ARE A PASSENGER IN A CAR FOR AN HOUR WITHOUT A BREAK: MODERATE CHANCE OF DOZING
ESS TOTAL SCORE: 13
HOW LIKELY ARE YOU TO NOD OFF OR FALL ASLEEP WHILE SITTING QUIETLY AFTER LUNCH WITHOUT ALCOHOL: SLIGHT CHANCE OF DOZING
HOW LIKELY ARE YOU TO NOD OFF OR FALL ASLEEP WHILE WATCHING TV: HIGH CHANCE OF DOZING
HOW LIKELY ARE YOU TO NOD OFF OR FALL ASLEEP WHILE LYING DOWN TO REST IN THE AFTERNOON WHEN CIRCUMSTANCES PERMIT: SLIGHT CHANCE OF DOZING
HOW LIKELY ARE YOU TO NOD OFF OR FALL ASLEEP IN A CAR, WHILE STOPPED FOR A FEW MINUTES IN TRAFFIC: MODERATE CHANCE OF DOZING
HOW LIKELY ARE YOU TO NOD OFF OR FALL ASLEEP WHILE SITTING AND READING: SLIGHT CHANCE OF DOZING
HOW LIKELY ARE YOU TO NOD OFF OR FALL ASLEEP WHILE SITTING INACTIVE IN A PUBLIC PLACE: SLIGHT CHANCE OF DOZING
HOW LIKELY ARE YOU TO NOD OFF OR FALL ASLEEP WHILE SITTING AND TALKING TO SOMEONE: MODERATE CHANCE OF DOZING

## 2025-03-31 ASSESSMENT — PATIENT HEALTH QUESTIONNAIRE - PHQ9
1. LITTLE INTEREST OR PLEASURE IN DOING THINGS: NOT AT ALL
SUM OF ALL RESPONSES TO PHQ QUESTIONS 1-9: 0
2. FEELING DOWN, DEPRESSED OR HOPELESS: NOT AT ALL
SUM OF ALL RESPONSES TO PHQ QUESTIONS 1-9: 0

## 2025-03-31 NOTE — PROGRESS NOTES
Richard Barnes M.D  Pulmonary Critical Care & Sleep Medicine     Sleep Medicine Note    Name: Kahlil Zhou     : 1980     Date: 3/31/2025      Referring provider: Africa Burgess MD   PCP: Africa Burgess MD   IMPRESSION:   44-year-old morbidly obese gentleman with prior diagnosis of severe sleep apnea, not using CPAP, current CPAP machine is 6 years old, motivated to use CPAP at this point, ongoing excess daytime sleepiness snoring choking arousals ESS of 16  Severe CAMELIA:  with oxygen tana of 72%  Diagnosis of sleep apnea pathophysiology and impact on various comorbid comorbid condition including heart failure, stroke and heart disease has been discussed at length  Patient is motivated to be treated  We will change patient on auto CPAP 10-20 with fullface fitting mask as requested  Follow-up in 3 months with compliance download  CPAP dependency: DME first choice/[employer]  Prior CPAP of 16 was deemed optimal  CPAP compliance download showed that patient only used it for 1 day which showed CPAP of 16 AHI remained 0  No clear reason for him not to use CPAP  He is motivated to use CPAP, with the use of CPAP at improvement in ESS and other symptoms of fatigue sleepiness and memory and concentration  New prescription for auto CPAP 10-20 sent to DME-I tried to change it online myself  HTN:   Continue management per PCP  Optimal treatment of CAMELIA will help.   Obesity/Overweight:   3/31/25 Estimated body mass index is 50.05 kg/m² as calculated from the following:    Height as of this encounter: 1.829 m (6').    Weight as of this encounter: 167.4 kg (369 lb).   Weight loss is encouraged  EtOH use:   Drinks almost every night  Woke at length that alcohol and his impact on sleep  Further management per PCP  Obesity hypoventilation syndrome:   Bicarb on chemistry is 30-31  Patient's current weight with BMI more than 35  Strong possibility of obesity hypoventilation syndrome  If patient continued to have

## 2025-03-31 NOTE — PROGRESS NOTES
Kahlil Zhou presents today for   Chief Complaint   Patient presents with    Snoring       Is someone accompanying this pt? no    Is the patient using any DME equipment during OV? no    -[employer]    Have you ever had a sleep study done before? yes, 4 years ago    Depression Screening:      3/31/2025     3:32 PM   PHQ-9    Little interest or pleasure in doing things 0   Feeling down, depressed, or hopeless 0   PHQ-2 Score 0   PHQ-9 Total Score 0        [place] Sleepiness Scale:      3/31/2025     3:32 PM   Sleep Medicine   Sitting and reading 1   Watching TV 3   Sitting, inactive in a public place (e.g. a theatre or a meeting) 1   As a passenger in a car for an hour without a break 2   Lying down to rest in the afternoon when circumstances permit 1   Sitting and talking to someone 2   Sitting quietly after a lunch without alcohol 1   In a car, while stopped for a few minutes in traffic 2   [place] Sleepiness Score 13   Neck (Inches) 21       Stop-Bang:       No data to display                  Coordination of Care:  1. Have you been to the ER, urgent care clinic since your last visit? Hospitalized since your last visit? no    2. Have you seen or consulted any other health care providers outside of the [facility] since your last visit? Include any pap smears or colon screening. no    Medication list has been updated according to patient.

## 2025-04-15 DIAGNOSIS — N52.9 ERECTILE DYSFUNCTION, UNSPECIFIED ERECTILE DYSFUNCTION TYPE: ICD-10-CM

## 2025-04-15 DIAGNOSIS — I10 ESSENTIAL (PRIMARY) HYPERTENSION: ICD-10-CM

## 2025-04-15 DIAGNOSIS — E78.2 MIXED HYPERLIPIDEMIA: ICD-10-CM

## 2025-04-15 NOTE — TELEPHONE ENCOUNTER
Pt requesting refills    Sildenafil 100 MG tablet    Amlodipine 10 MG    Atorvastatin 20 MG    [address]    [phone]

## 2025-04-16 RX ORDER — SILDENAFIL 100 MG/1
TABLET, FILM COATED ORAL
Qty: 30 TABLET | Refills: 2 | Status: SHIPPED | OUTPATIENT
Start: 2025-04-16

## 2025-04-16 RX ORDER — AMLODIPINE BESYLATE 10 MG/1
10 TABLET ORAL DAILY
Qty: 90 TABLET | Refills: 1 | Status: SHIPPED | OUTPATIENT
Start: 2025-04-16

## 2025-04-16 RX ORDER — ATORVASTATIN CALCIUM 20 MG/1
20 TABLET, FILM COATED ORAL DAILY
Qty: 90 TABLET | Refills: 1 | Status: SHIPPED | OUTPATIENT
Start: 2025-04-16

## 2025-05-29 ENCOUNTER — TELEPHONE (OUTPATIENT)
Facility: CLINIC | Age: 45
End: 2025-05-29

## 2025-05-29 DIAGNOSIS — M1A.09X0 CHRONIC GOUT OF MULTIPLE SITES, UNSPECIFIED CAUSE: ICD-10-CM

## 2025-05-29 DIAGNOSIS — E78.2 MIXED HYPERLIPIDEMIA: ICD-10-CM

## 2025-05-29 DIAGNOSIS — I10 ESSENTIAL (PRIMARY) HYPERTENSION: ICD-10-CM

## 2025-05-29 RX ORDER — LOSARTAN POTASSIUM AND HYDROCHLOROTHIAZIDE 25; 100 MG/1; MG/1
1 TABLET ORAL DAILY
Qty: 90 TABLET | Refills: 1 | Status: SHIPPED | OUTPATIENT
Start: 2025-05-29

## 2025-05-29 RX ORDER — AMLODIPINE BESYLATE 10 MG/1
10 TABLET ORAL DAILY
Qty: 90 TABLET | Refills: 1 | Status: SHIPPED | OUTPATIENT
Start: 2025-05-29

## 2025-05-29 RX ORDER — ALLOPURINOL 300 MG/1
300 TABLET ORAL DAILY
Qty: 90 TABLET | Refills: 1 | Status: SHIPPED | OUTPATIENT
Start: 2025-05-29

## 2025-05-29 RX ORDER — ATORVASTATIN CALCIUM 20 MG/1
20 TABLET, FILM COATED ORAL DAILY
Qty: 90 TABLET | Refills: 1 | Status: SHIPPED | OUTPATIENT
Start: 2025-05-29

## 2025-05-29 NOTE — TELEPHONE ENCOUNTER
Patient called to request refills for  amLODIPine (NORVASC) 10 MG tablet   atorvastatin (LIPITOR) 20 MG   allopurinol (ZYLOPRIM) 300 MG tablet   losartan-hydroCHLOROthiazide (HYZAAR) 100-25 MG per tablet

## 2025-06-20 DIAGNOSIS — M1A.09X0 CHRONIC GOUT OF MULTIPLE SITES, UNSPECIFIED CAUSE: ICD-10-CM

## 2025-06-24 ENCOUNTER — OFFICE VISIT (OUTPATIENT)
Facility: CLINIC | Age: 45
End: 2025-06-24
Payer: COMMERCIAL

## 2025-06-24 VITALS
WEIGHT: 315 LBS | OXYGEN SATURATION: 96 % | BODY MASS INDEX: 42.66 KG/M2 | DIASTOLIC BLOOD PRESSURE: 76 MMHG | HEART RATE: 72 BPM | SYSTOLIC BLOOD PRESSURE: 123 MMHG | HEIGHT: 72 IN

## 2025-06-24 DIAGNOSIS — F10.10 ALCOHOL ABUSE, UNCOMPLICATED: ICD-10-CM

## 2025-06-24 DIAGNOSIS — E66.01 MORBID (SEVERE) OBESITY DUE TO EXCESS CALORIES (HCC): ICD-10-CM

## 2025-06-24 DIAGNOSIS — R73.03 PREDIABETES: ICD-10-CM

## 2025-06-24 DIAGNOSIS — G47.33 OSA (OBSTRUCTIVE SLEEP APNEA): ICD-10-CM

## 2025-06-24 DIAGNOSIS — M1A.09X0 CHRONIC GOUT OF MULTIPLE SITES, UNSPECIFIED CAUSE: ICD-10-CM

## 2025-06-24 DIAGNOSIS — I10 ESSENTIAL (PRIMARY) HYPERTENSION: Primary | ICD-10-CM

## 2025-06-24 DIAGNOSIS — E66.2 OBESITY HYPOVENTILATION SYNDROME (HCC): ICD-10-CM

## 2025-06-24 DIAGNOSIS — N52.9 ERECTILE DYSFUNCTION, UNSPECIFIED ERECTILE DYSFUNCTION TYPE: ICD-10-CM

## 2025-06-24 DIAGNOSIS — E78.2 MIXED HYPERLIPIDEMIA: ICD-10-CM

## 2025-06-24 PROCEDURE — 99214 OFFICE O/P EST MOD 30 MIN: CPT | Performed by: STUDENT IN AN ORGANIZED HEALTH CARE EDUCATION/TRAINING PROGRAM

## 2025-06-24 PROCEDURE — 3078F DIAST BP <80 MM HG: CPT | Performed by: STUDENT IN AN ORGANIZED HEALTH CARE EDUCATION/TRAINING PROGRAM

## 2025-06-24 PROCEDURE — 3074F SYST BP LT 130 MM HG: CPT | Performed by: STUDENT IN AN ORGANIZED HEALTH CARE EDUCATION/TRAINING PROGRAM

## 2025-06-24 RX ORDER — INDOMETHACIN 50 MG/1
CAPSULE ORAL
Qty: 30 CAPSULE | Refills: 1 | Status: SHIPPED | OUTPATIENT
Start: 2025-06-24

## 2025-06-24 RX ORDER — SILDENAFIL 100 MG/1
TABLET, FILM COATED ORAL
Qty: 30 TABLET | Refills: 2 | Status: SHIPPED | OUTPATIENT
Start: 2025-06-24

## 2025-06-24 SDOH — ECONOMIC STABILITY: FOOD INSECURITY: WITHIN THE PAST 12 MONTHS, THE FOOD YOU BOUGHT JUST DIDN'T LAST AND YOU DIDN'T HAVE MONEY TO GET MORE.: NEVER TRUE

## 2025-06-24 SDOH — ECONOMIC STABILITY: FOOD INSECURITY: WITHIN THE PAST 12 MONTHS, YOU WORRIED THAT YOUR FOOD WOULD RUN OUT BEFORE YOU GOT MONEY TO BUY MORE.: NEVER TRUE

## 2025-06-24 ASSESSMENT — PATIENT HEALTH QUESTIONNAIRE - PHQ9
2. FEELING DOWN, DEPRESSED OR HOPELESS: NOT AT ALL
SUM OF ALL RESPONSES TO PHQ QUESTIONS 1-9: 0
1. LITTLE INTEREST OR PLEASURE IN DOING THINGS: NOT AT ALL
SUM OF ALL RESPONSES TO PHQ QUESTIONS 1-9: 0

## 2025-06-24 ASSESSMENT — ENCOUNTER SYMPTOMS
RHINORRHEA: 0
VOMITING: 0
CONSTIPATION: 0
CHEST TIGHTNESS: 0
NAUSEA: 0
DIARRHEA: 0
ABDOMINAL PAIN: 0
BACK PAIN: 0
SHORTNESS OF BREATH: 0

## 2025-06-25 RX ORDER — INDOMETHACIN 50 MG/1
CAPSULE ORAL
Qty: 30 CAPSULE | Refills: 0 | OUTPATIENT
Start: 2025-06-25

## 2025-06-26 ENCOUNTER — TELEPHONE (OUTPATIENT)
Facility: CLINIC | Age: 45
End: 2025-06-26

## 2025-06-30 ENCOUNTER — HOSPITAL ENCOUNTER (OUTPATIENT)
Facility: HOSPITAL | Age: 45
Setting detail: SPECIMEN
Discharge: HOME | End: 2025-07-03

## 2025-06-30 DIAGNOSIS — M1A.09X0 CHRONIC GOUT OF MULTIPLE SITES, UNSPECIFIED CAUSE: ICD-10-CM

## 2025-06-30 DIAGNOSIS — E78.2 MIXED HYPERLIPIDEMIA: ICD-10-CM

## 2025-06-30 DIAGNOSIS — R73.03 PREDIABETES: ICD-10-CM

## 2025-06-30 DIAGNOSIS — I10 ESSENTIAL (PRIMARY) HYPERTENSION: ICD-10-CM

## 2025-06-30 LAB — SENTARA SPECIMEN COLLECTION: NORMAL

## 2025-06-30 PROCEDURE — 99001 SPECIMEN HANDLING PT-LAB: CPT

## 2025-07-01 LAB
A/G RATIO: 1.2 RATIO (ref 1.1–2.6)
ALBUMIN: 4.3 G/DL (ref 3.5–5)
ALP BLD-CCNC: 66 U/L (ref 25–115)
ALT SERPL-CCNC: 23 U/L (ref 5–40)
ANION GAP SERPL CALCULATED.3IONS-SCNC: 14 MMOL/L (ref 3–15)
AST SERPL-CCNC: 27 U/L (ref 10–37)
BASOPHILS # BLD: 1 % (ref 0–2)
BASOPHILS ABSOLUTE: 0 K/UL (ref 0–0.2)
BILIRUB SERPL-MCNC: 0.5 MG/DL (ref 0.2–1.2)
BUN BLDV-MCNC: 13 MG/DL (ref 6–22)
CALCIUM SERPL-MCNC: 9.2 MG/DL (ref 8.4–10.5)
CHLORIDE BLD-SCNC: 100 MMOL/L (ref 98–110)
CHOLESTEROL, TOTAL: 146 MG/DL (ref 110–200)
CHOLESTEROL/HDL RATIO: 3.8 (ref 0–5)
CO2: 24 MMOL/L (ref 20–32)
CREAT SERPL-MCNC: 1.1 MG/DL (ref 0.5–1.2)
EOSINOPHIL # BLD: 4 % (ref 0–6)
EOSINOPHILS ABSOLUTE: 0.2 K/UL (ref 0–0.5)
ESTIMATED AVERAGE GLUCOSE: 105 MG/DL (ref 91–123)
GFR, ESTIMATED: 80.2 ML/MIN/1.73 SQ.M.
GLOBULIN: 3.6 G/DL (ref 2–4)
GLUCOSE: 100 MG/DL (ref 70–99)
HBA1C MFR BLD: 5.3 % (ref 4.8–5.6)
HCT VFR BLD CALC: 49.7 % (ref 39.3–51.6)
HDLC SERPL-MCNC: 38 MG/DL
HEMOGLOBIN: 14.6 G/DL (ref 13.1–17.2)
LDL CHOLESTEROL: 80 MG/DL (ref 50–99)
LDL/HDL RATIO: 2.1
LYMPHOCYTES # BLD: 33 % (ref 20–45)
LYMPHOCYTES ABSOLUTE: 1.9 K/UL (ref 1–4.8)
MCH RBC QN AUTO: 29 PG (ref 26–34)
MCHC RBC AUTO-ENTMCNC: 29 G/DL (ref 31–36)
MCV RBC AUTO: 97 FL (ref 80–95)
MONOCYTES ABSOLUTE: 0.6 K/UL (ref 0.1–1)
MONOCYTES: 10 % (ref 3–12)
NEUTROPHILS ABSOLUTE: 3 K/UL (ref 1.8–7.7)
NEUTROPHILS SEGMENTED: 52 % (ref 40–75)
NON-HDL CHOLESTEROL: 108 MG/DL
PDW BLD-RTO: 15.1 % (ref 10–15.5)
PLATELET # BLD: 276 K/UL (ref 140–440)
PMV BLD AUTO: 10.8 FL (ref 9–13)
POTASSIUM SERPL-SCNC: 4.2 MMOL/L (ref 3.5–5.5)
RBC # BLD: 5.11 M/UL (ref 3.8–5.8)
SODIUM BLD-SCNC: 138 MMOL/L (ref 133–145)
TOTAL PROTEIN: 7.9 G/DL (ref 6.4–8.3)
TRIGL SERPL-MCNC: 140 MG/DL (ref 40–149)
URIC ACID: 8.1 MG/DL (ref 3.9–9)
VLDLC SERPL CALC-MCNC: 28 MG/DL (ref 8–30)
WBC # BLD: 5.7 K/UL (ref 4–11)